# Patient Record
Sex: FEMALE | Race: WHITE | Employment: OTHER | URBAN - METROPOLITAN AREA
[De-identification: names, ages, dates, MRNs, and addresses within clinical notes are randomized per-mention and may not be internally consistent; named-entity substitution may affect disease eponyms.]

---

## 2017-02-10 ENCOUNTER — OFFICE VISIT (OUTPATIENT)
Dept: PULMONOLOGY | Facility: CLINIC | Age: 57
End: 2017-02-10
Attending: INTERNAL MEDICINE
Payer: MEDICARE

## 2017-02-10 VITALS
TEMPERATURE: 97.7 F | HEIGHT: 65 IN | SYSTOLIC BLOOD PRESSURE: 123 MMHG | WEIGHT: 192 LBS | HEART RATE: 114 BPM | DIASTOLIC BLOOD PRESSURE: 85 MMHG | RESPIRATION RATE: 18 BRPM | OXYGEN SATURATION: 94 % | BODY MASS INDEX: 31.99 KG/M2

## 2017-02-10 DIAGNOSIS — J44.9 CHRONIC AIRWAY OBSTRUCTION (H): Primary | ICD-10-CM

## 2017-02-10 DIAGNOSIS — J44.9 CHRONIC OBSTRUCTIVE PULMONARY DISEASE, UNSPECIFIED COPD TYPE (H): Primary | ICD-10-CM

## 2017-02-10 DIAGNOSIS — J44.1 COPD EXACERBATION (H): ICD-10-CM

## 2017-02-10 PROCEDURE — 99212 OFFICE O/P EST SF 10 MIN: CPT | Mod: ZF

## 2017-02-10 RX ORDER — PREDNISONE 10 MG/1
TABLET ORAL
Qty: 23 TABLET | Refills: 1 | Status: SHIPPED | OUTPATIENT
Start: 2017-02-10 | End: 2017-08-11

## 2017-02-10 ASSESSMENT — PAIN SCALES - GENERAL: PAINLEVEL: MILD PAIN (2)

## 2017-02-10 NOTE — NURSING NOTE
Chief Complaint   Patient presents with     RECHECK     Pulmonary Nodules     Shabnam Anders LPN  Neuroscience- Movement Disorders and Epilepsy

## 2017-02-10 NOTE — PROGRESS NOTES
"Reason for Visit  Mary Kay Deal is a 56 year old female who is being seen for RECHECK    Pulmonary HPI  The patient was seen and examined by Keith Mason MD     Mary Kay Deal is a 56 year old female with severe COPD on home O2.  She was empirically treated with SBRT for possible lung cancer (non biopsy proven) in the left UL in 2013. She has multiple pulmonary nodules.    Interval History:    The patient presents today with a cold which began 3 days ago in her sinuses (mild sore throat, rhinorrhea and sinus congestion). She woke up this morning with chest pain, possibly due to coughing. Sputum is currently yellow. She rarely produces sputum if she is not sick. Some blood in sinus drainage, but no hemoptysis. She has been taking 20 mg prednisone for the last 3 days.  Breathing is slightly worse today due to her current illness. She has experienced heartburn 1-2 times in the past week, which is her baseline. In general, she has been sleeping a little better but notices that her quality of sleep \"goes in streaks\" - she will sleep poorly for a few days and then sleep well for a few days. She has not started antibiotics for her current illness. The patient has been taking a day or 2 of prednisone for her breathing and then will stop. She has done this about 3 times since her appointment in August.      Before her recent cold, she reports she was not exercising. The patient's  recently set up a treadmill in her living room so that she can watch TV during exercise.    O2 requirements/use:   The patient does not always require O2 at rest, but will use 1-2lpm NC. While she is walking she requires 3-4lpm. When she is sleeping she uses 3-3.5 lpm supplemental O2.     Current Outpatient Prescriptions   Medication     predniSONE (DELTASONE) 10 MG tablet     simvastatin (ZOCOR) 20 MG tablet     tiotropium (SPIRIVA HANDIHALER) 18 MCG inhalation capsule     ACETAMINOPHEN PO     azithromycin (ZITHROMAX) 250 MG tablet     " omeprazole (PRILOSEC) 20 MG capsule     fluticasone (FLONASE) 50 MCG/ACT nasal spray     doxycycline (VIBRAMYCIN) 100 MG capsule     LORazepam (ATIVAN) 0.5 MG tablet     sertraline (ZOLOFT) 50 MG tablet     order for DME     albuterol (VENTOLIN HFA) 108 (90 BASE) MCG/ACT inhaler     Respiratory Therapy Supplies (NEBULIZER COMPRESSOR) KIT     [facility]. Devices (FINGERTIP PULSE OXIMETER) MISC     Chlorpheniramine-DM 4-20 MG TABS     Cholecalciferol (VITAMIN D) 1000 UNIT capsule     Calcium (CALCIUM 600) 600 MG tablet     ORDER FOR DME     MULTIVITAMIN TABS   OR     SENNA LAXATIVE OR     budesonide (PULMICORT) 1 MG/2ML SUSP nebulizer solution     arformoterol (BROVANA) 15 MCG/2ML NEBU     No current facility-administered medications for this visit.     Allergies   Allergen Reactions     Penicillins Hives     Past Medical History   Diagnosis Date     SMOKER      Other malaise and fatigue      GERD      Disorder of bone and cartilage, unspecified      Mild intermittent asthma      Emphysema      Aspergillosis, with pneumonia (H)      Other dyspnea and respiratory abnormality      Cancer (H)      questionable lung CA     Constipation      Chronic infection      Known fungal lung condition     Hx of radiation therapy      For lung mass       Past Surgical History   Procedure Laterality Date     Surgical history of -        s/p Breast tumor @ age 15 - benign     Surgical history of -        c section     Surgical history of -        tonsillectomy     Gyn surgery        x 1     Breast surgery       Tumor removal at the age of 15     Ent surgery       Tonsillectomy     Colonoscopy N/A 2014     Procedure: COLONOSCOPY;  Surgeon: Patricia Perry MD;  Location:  OR     Colonoscopy N/A 2015     Procedure: COMBINED COLONOSCOPY, SINGLE OR MULTIPLE BIOPSY/POLYPECTOMY BY BIOPSY;  Surgeon: Patricia Perry MD;  Location:  GI       Social History     Social History     Marital Status:      Spouse  "Name: N/A     Number of Children: N/A     Years of Education: N/A     Occupational History     Not on file.     Social History Main Topics     Smoking status: Former Smoker -- 1.00 packs/day for 30 years     Types: Cigarettes     Quit date: 10/21/2005     Smokeless tobacco: Never Used     Alcohol Use: No      Comment: sober since 1986     Drug Use: No     Sexual Activity:     Partners: Male      Comment: vas     Other Topics Concern     Parent/Sibling W/ Cabg, Mi Or Angioplasty Before 65f 55m? Yes     Social History Narrative    , 3 children    Retired  at [employer]          The patient is currently on disability due to COPD.  She rides a scooter due to her lungs.  She used to work as a .  She and her  are living with their daughter in [place].  They have a [employer] mix.  The patient stopped smoking 9 years ago.  She smoked 1-2 packs of cigarettes per day for 30 years, consistent with 45-60 pack year history.  She denies cigars, pipes or chewing tobacco.  She smoked marijuana in her teens.  The patient is an alcoholic and has been sober for the past 27 years.  Caffeine intake includes 4-5 cups of caffeinated coffee every morning.  She does not drink tea.  She consumes 1 can of Diet Pepsi as late as 7:00 p.m.  She eats chocolate candy bars as late as 7:00 p.m.  She does not exercise.  She has a treadmill, so she could; however, as of this time she is not exercising.                ROS Pulmonary  Constitutional- Negative. She has been trying to watch her diet and has stopped buying sweets for her house.  Eyes- Positive: Recently her eyes have been dry and sore, with \"little hard pellets\". No issues with double/blurry vision. Eyes are slightly red. She has tried a few different kind of eye drops, and has been using them every couple of hours at least.   Ear, nose and throat- Positive, increased sinus sx with recent cold, see HPI. Before her recent cold she notes her sinuses " "had improved with addition of Singulair.  Cardiac- She notes an occasional mid-sternal knife-like sensation, for which she saw her PCP in [place].   Pulm- See HPI  GI- Positive, Bowel movements are more regular, she is not experiencing constipation as frequently due to increased consistency with laxatives and increased water intake. She does note heartburn 1-2x/week.   Genitourinary- Negative  Musculoskeletal- Negative. Her back pain is at baseline.   Neurology- Positive, Headaches due to neck pain.  Dermatology- Negative  Endocrine- Negative  Lymphatic- Negative  Psychiatry- Negative    A complete ROS was otherwise negative except as noted in the HPI.    /85 mmHg  Pulse 114  Temp(Src) 97.7  F (36.5  C)  Resp 18  Ht 1.651 m (5' 5\")  Wt 87.091 kg (192 lb)  BMI 31.95 kg/m2  SpO2 94%  LMP 09/08/2004  Body mass index is 31.95 kg/(m^2).   Exam:   GENERAL APPEARANCE: Well developed, well nourished, alert, and in no apparent distress.  EYES: PERRL, EOMI  HENT: Nasal mucosa with no edema and no hyperemia. No nasal polyps.  EARS: Canals clear, TMs normal.  MOUTH: Oral mucosa is moist, without any lesions, no tonsillar enlargement, no oropharyngeal exudate.  NECK: supple, no masses, no thyromegaly.  LYMPHATICS: No significant axillary, cervical, or supraclavicular nodes.  RESP: normal percussion, Very diminished air flow throughout.  No crackles. No rhonchi. No wheezes.  CV: Normal S1, S2, regular rhythm, normal rate. No murmur.  No rub. No gallop. No LE edema.     Results:  Recent Results (from the past 168 hour(s))   General PFT Lab (Please always keep checked)    Collection Time: 02/10/17  9:54 AM   Result Value Ref Range    FVC-Pred 3.35 L    FVC-Pre 1.62 L    FVC-%Pred-Pre 48 %    FEV1-Pre 0.43 L    FEV1-%Pred-Pre 16 %    FEV1FVC-Pred 79 %    FEV1FVC-Pre 27 %    FEFMax-Pred 6.57 L/sec    FEFMax-Pre 1.37 L/sec    FEFMax-%Pred-Pre 20 %    FEF2575-Pred 2.43 L/sec    FEF2575-Pre 0.19 L/sec    ISL9032-%Pred-Pre 7 " %    ExpTime-Pre 9.22 sec    FIFMax-Pre 2.72 L/sec    FEV1FEV6-Pred 81 %    FEV1FEV6-Pre 32 %       Assessment and plan: Mary Kay Deal is a 56-year-old female has a longstanding history of severe obstructive lung disease who comes today to the clinic for follow up.    1. RUL mass /Aspergillus:  This lesion was biopsied by IR and it showed aspergillus and was treated with one month course of voriconazole. Due to lack of improvement it was stopped as per pt. This has remained stable. This was followed/managed by Dr. Pierre.    2. Emphysema: Very severe lung disease.  - Will cont O2 at night and with activity.  6MWT (1/22/2016): Walked 460 ft on 4 lpm NC. Hence recommended to use this during the day with exertion and while sleeping. Can be on RA at rest.   - Will cont flu vaccine every year and has received pneumonia vaccine 5 years apart x2 doses. Received PCV 13 12/5/2014.  - Cont spiriva and prn nebs/inhalers. Cont Budesonide/Brovana nebs.  - Cont azithromycin daily. Will follow Qtc prn --457msec on 6/13/2015  - She would benefit from doing outpt pulm rehab but she is unable to do it due to lack of transportation. She has a treadmill at home and is currently using it to exercise.  - Cont prn lorazepam.  - Cont singular daily.   2/10/2017: Currently having a COPD exacerbation.  - Start 10 day course of Doxycycline for current exacerbation. Cont prednisone 20 mg daily for 7 more days.   - Pt given sputum sample cup, instructed to provide sample when able.  - Advised pt to start prednisone burst at 40 mg and taper the next time she notices increased pulmonary symptoms.    Insomnia: Sleep study - no MADELIN noted (5/2014).    3. Pulmonary nodules:    - Chest CT done on 08/3/12 showed a  New left UL nodule that increased in size when followed. This was reviewed at Pulmonary nodule conference and given the risk of doing biopsy and after seeing Dr. Lorenzo she underwent SBRT. SBRT 5000cGy in 5 fractions over 3 months - last  dose on 08/29/13.  Chest CT (1/2014):  1.Prominent spiculated mass, inflammatory process or scarring in the right lung apex appears not significantly changed. 2. Decrease in the prominence of the patchy opacities in the left upper lobe, presumed postradiation changes.  Chest CT (10/2014): 1. Spiculated right apical mass is not significantly changed in size. 2. Some new or increased nodular densities from 07/08/2014. These include a lateral right lower lobe subpleural focus, and anterolateral left apical focus, and superior segment posterior lower lobe focus.    Chest CT 7/17/2015: 1. New findings on the 4/21/2015 chest CT have essentially resolved, including consolidation in the right middle lobe, left lower lobe, and a new right lower lobe pulmonary nodule. 2. There is a 3 mm left lower lobe pulmonary nodule that was new on the 7/8/2014 exam, and therefore demonstrates one-year of stability. Continued followup per Fleischner Society guidelines is recommended. 3. Centrilobular emphysema. 4. Small hiatal hernia.    Chest CT (1/22/16): Stable pulm nodules.    Chest CT (8/5/2016): Stable pulm nodules.    --  She is followed by Radiation oncology and they will continue to determine appropriate imaging follow up. She will get a Chest CT in one month. We will at the minimum need yearly Chest CT scans.    4. Allergies:   - Cont flonase 1 spray each nostril BID.   - To do netipot (nasal rinses) prn   - Cont Singulair.    5. Obesity:   -  Strongly advised to loose weight.   - Encouraged patient to eat healthier and gradually increase her exercise for weight loss.    6. GERD:   - Controlled on PPI at 20mg daily.    7. H/o Lung ca:  Continue follow up with Radiation oncology, they will decide about treatment regimen and CT's. This was Left UL lesion.    8. Recurrent episodes of voice loss: Improved, less often.   - Sputum cultures from 1/5/2015 were negative.    - Pt can continue to follow up with ENT.     9. Dry eyes:   -  Nothing concerning appreciated with physical exam. She is currently using OTC eye drops.   - Advised pt see an eye doctor if symptoms worsen.     F/u in six months with Spirometry.    Orders Placed This Encounter   Procedures     RESPIRATORY FLOW VOLUME LOOP          Scribe Disclosure:   I, Dania Abraham, am serving as a scribe; to document services personally performed by Keith Mason MD based on data collection and the provider's statements to me.     Provider Disclosure:  I agree with above History, Review of Systems, Physical exam and Plan.  I have reviewed the content of the documentation and have edited it as needed. I have personally performed the services documented here and the documentation accurately represents those services and the decisions I have made.      Electronically signed by:  Keith Mason MD.

## 2017-02-10 NOTE — MR AVS SNAPSHOT
After Visit Summary   2/10/2017    Mary Kay Deal    MRN: [id number]           Patient Information     Date Of Birth          1960        Visit Information        Provider Department      2/10/2017 10:30 AM Keith Mason MD [facility]        Today's Diagnoses     Chronic obstructive pulmonary disease, unspecified COPD type (H)    -  1     COPD exacerbation (H)            Follow-ups after your visit        Follow-up notes from your care team     Return in about 6 months (around 8/10/2017).      Your next 10 appointments already scheduled     Mar 15, 2017 11:00 AM   [facility] ([facility])    [address] [place] [id number] [phone]           Please bring any scans or X-rays taken at other hospitals, if similar tests were done. Also bring a list of your medicines, including vitamins, minerals and over-the-counter drugs. It is safest to leave personal items at home.  Be sure to tell your doctor:   If you have any allergies.   If there s any chance you are pregnant.   If you are breastfeeding.   If you have any special needs.  You do not need to do anything special to prepare.  Please wear loose clothing, such as a sweat suit or jogging clothes. Avoid snaps, zippers and other metal. We may ask you to undress and put on a hospital gown.            Mar 21, 2017 11:00 AM   Return Visit with Merrill Nevarez MD   Radiation Oncology Clinic ([facility])    [facility]  1st Floor  [address] [address]   [phone]            Aug 11, 2017 10:00 AM   PFT VISIT with  PFL A   [facility] Pulmonary Function Testing ([facility])    [address]  3rd Floor  [facility] [id number] [phone]            Aug 11, 2017 10:30 AM   (Arrive by 10:15 AM)   Return Visit with Keith Mason MD   [facility] ([facility])    [address]  3rd Floor  [facility] [address] [phone]              Future tests that were ordered for you today     Open Future Orders        Priority Expected Expires Ordered    Sputum Culture Aerobic Bacterial Routine 2/10/2017 8/10/2017 2/10/2017    Gram stain Routine 2/10/2017 8/10/2017 2/10/2017    Fungus Culture, non-blood Routine 2/10/2017 8/10/2017 2/10/2017    RESPIRATORY FLOW VOLUME LOOP Routine 8/10/2017 8/10/2017 2/10/2017            Who to contact     If you have questions or need follow up information about today's clinic visit or your schedule please contact [facility] directly at [phone].  Normal or non-critical lab and imaging results will be communicated to you by [employer], letter or phone within 4 business days after the clinic has received the results. If you do not hear from us within 7 days, please contact the clinic through [employer] or phone. If you have a critical or abnormal lab result, we will notify you by phone as soon as possible.  Submit refill requests through [employer] or call your pharmacy and they will forward the refill request to us. Please allow 3 business days for your refill to be completed.          Additional Information About Your Visit        [employer] Information     [employer] gives you secure access to your electronic health record. If you see a primary care provider, you can also send messages to your care team and make appointments. If you have questions, please call your primary care clinic.  If you do not have a primary care provider, please call [phone] and they will assist you.        Care EveryWhere ID     This is your Care EveryWhere ID. This could be used by other organizations to access your [facility] medical records  [id number]        Your Vitals Were     Pulse Temperature Respirations Height BMI (Body Mass Index) Pulse Oximetry     "114 97.7  F (36.5  C) 18 1.651 m (5' 5\") 31.95 kg/m2 94%    Last Period                   09/08/2004            Blood Pressure from Last 3 Encounters:   02/10/17 123/85   10/03/16 122/84   09/20/16 132/86    Weight from Last 3 Encounters:   02/10/17 87.091 kg (192 lb)   10/03/16 89.359 kg (197 lb)   09/20/16 87.68 kg (193 lb 4.8 oz)                 Where to get your medicines      These medications were sent to [facility] - [place], [place] - [address]  [address], [facility] [address]     Phone:  [phone]    - predniSONE 10 MG tablet       Primary Care Provider Office Phone # Fax #    Doug Godoy [phone]       [facility] [address]  [facility] [address]        Thank you!     Thank you for choosing [facility]  for your care. Our goal is always to provide you with excellent care. Hearing back from our patients is one way we can continue to improve our services. Please take a few minutes to complete the written survey that you may receive in the mail after your visit with us. Thank you!             Your Updated Medication List - Protect others around you: Learn how to safely use, store and throw away your medicines at www.disposemymeds.org.          This list is accurate as of: 2/10/17  4:05 PM.  Always use your most recent med list.                   Brand Name Dispense Instructions for use    ACETAMINOPHEN PO      Take 325-650 mg by mouth every 6 hours as needed for pain       albuterol 108 (90 BASE) MCG/ACT Inhaler    VENTOLIN HFA    3 Inhaler    Inhale 2 puffs into the lungs every 4 hours as needed       arformoterol 15 MCG/2ML Nebu neb solution    BROVANA    120 mL    Take 2 mLs (15 mcg) by nebulization 2 times daily       azithromycin 250 MG tablet    ZITHROMAX    30 tablet    Take 1 tablet (250 mg) by mouth daily       budesonide 1 MG/2ML Susp neb solution    PULMICORT    " 60 ampule    Take 2 mLs (1 mg) by nebulization 2 times daily       calcium carbonate 600 MG tablet   Generic drug:  calcium      Take 1 tablet by mouth 2 times daily. 1-2 tablets as tolerated       Chlorpheniramine-DM 4-20 MG Tabs     84 tablet    Take 1 tablet by mouth every 6 hours as needed.       doxycycline 100 MG capsule    VIBRAMYCIN    28 capsule    Take 1 capsule (100 mg) by mouth 2 times daily as needed       Fingertip Pulse Oximeter Misc     1 each    To be used as needed - for SOB.       fluticasone 50 MCG/ACT spray    FLONASE    16 g    USE ONE SPRAY IN EACH NOSTRIL TWICE A DAY       LORazepam 0.5 MG tablet    ATIVAN    30 tablet    Take 1 tablet (0.5 mg) by mouth every 8 hours as needed for anxiety       MULTIVITAMIN TABS   OR      1 TABLET BY MOUTH DAILY       Nebulizer Compressor Kit     1 kit    1 Device 4 times daily       omeprazole 20 MG CR capsule    priLOSEC    180 capsule    TAKE ONE CAPSULE BY MOUTH TWICE A DAY       order for DME      O2 at night and prn       order for DME     1 Device    Equipment being ordered: Oxygen --Please provide oxygen at 4 LPM via nasal canula with exertion and with sleep. Can be on room air at rest. Please provide portability. Keep oxygen saturations above 90%.       predniSONE 10 MG tablet    DELTASONE    23 tablet    Take 40 mg x 2 days, 30 mg x 2 days, 20 mg x 3 days and 10 mg x 3 days       SENNA LAXATIVE PO      2 TABLET BY MOUTH DAILY AS NEEDED FOR CONSTIPATION       sertraline 50 MG tablet    ZOLOFT    135 tablet    TAKE ONE AND ONE-HALF TABLET BY MOUTH EVERY DAY       simvastatin 20 MG tablet    ZOCOR    90 tablet    TAKE ONE TABLET BY MOUTH AT BEDTIME       tiotropium 18 MCG capsule    SPIRIVA HANDIHALER    90 capsule    Inhale 1 capsule (18 mcg) into the lungs daily       vitamin D 1000 UNITS capsule      Take 1 capsule by mouth 2 times daily.

## 2017-02-10 NOTE — LETTER
"2/10/2017       RE: Mary Kay Deal  [address]  [facility] [address]     Dear Colleague,    Thank you for referring your patient, Mary Kay Deal, to the [facility] at [facility]. Please see a copy of my visit note below.    Reason for Visit  Mary Kay Deal is a 56 year old female who is being seen for RECHECK    Pulmonary HPI  The patient was seen and examined by Keith Mason MD     Mary Kay Deal is a 56 year old female with severe COPD on home O2.  She was empirically treated with SBRT for possible lung cancer (non biopsy proven) in the left UL in 2013. She has multiple pulmonary nodules.    Interval History:    The patient presents today with a cold which began 3 days ago in her sinuses (mild sore throat, rhinorrhea and sinus congestion). She woke up this morning with chest pain, possibly due to coughing. Sputum is currently yellow. She rarely produces sputum if she is not sick. Some blood in sinus drainage, but no hemoptysis. She has been taking 20 mg prednisone for the last 3 days.  Breathing is slightly worse today due to her current illness. She has experienced heartburn 1-2 times in the past week, which is her baseline. In general, she has been sleeping a little better but notices that her quality of sleep \"goes in streaks\" - she will sleep poorly for a few days and then sleep well for a few days. She has not started antibiotics for her current illness. The patient has been taking a day or 2 of prednisone for her breathing and then will stop. She has done this about 3 times since her appointment in August.      Before her recent cold, she reports she was not exercising. The patient's  recently set up a treadmill in her living room so that she can watch TV during exercise.    O2 requirements/use:   The patient does not always require O2 at rest, but will use 1-2lpm NC. While she is walking she requires 3-4lpm. When she is " sleeping she uses 3-3.5 lpm supplemental O2.     Current Outpatient Prescriptions   Medication     predniSONE (DELTASONE) 10 MG tablet     simvastatin (ZOCOR) 20 MG tablet     tiotropium (SPIRIVA HANDIHALER) 18 MCG inhalation capsule     ACETAMINOPHEN PO     azithromycin (ZITHROMAX) 250 MG tablet     omeprazole (PRILOSEC) 20 MG capsule     fluticasone (FLONASE) 50 MCG/ACT nasal spray     doxycycline (VIBRAMYCIN) 100 MG capsule     LORazepam (ATIVAN) 0.5 MG tablet     sertraline (ZOLOFT) 50 MG tablet     order for DME     albuterol (VENTOLIN HFA) 108 (90 BASE) MCG/ACT inhaler     Respiratory Therapy Supplies (NEBULIZER COMPRESSOR) KIT     [facility] (FINGERTIP PULSE OXIMETER) MISC     Chlorpheniramine-DM 4-20 MG TABS     Cholecalciferol (VITAMIN D) 1000 UNIT capsule     Calcium (CALCIUM 600) 600 MG tablet     ORDER FOR DME     MULTIVITAMIN TABS   OR     SENNA LAXATIVE OR     budesonide (PULMICORT) 1 MG/2ML SUSP nebulizer solution     arformoterol (BROVANA) 15 MCG/2ML NEBU     No current facility-administered medications for this visit.     Allergies   Allergen Reactions     Penicillins Hives     Past Medical History   Diagnosis Date     SMOKER      Other malaise and fatigue      GERD      Disorder of bone and cartilage, unspecified      Mild intermittent asthma      Emphysema      Aspergillosis, with pneumonia (H)      Other dyspnea and respiratory abnormality      Cancer (H)      questionable lung CA     Constipation      Chronic infection      Known fungal lung condition     Hx of radiation therapy      For lung mass       Past Surgical History   Procedure Laterality Date     Surgical history of -        s/p Breast tumor @ age 15 - benign     Surgical history of -        c section     Surgical history of -        tonsillectomy     Gyn surgery        x 1     Breast surgery       Tumor removal at the age of 15     Ent surgery       Tonsillectomy     Colonoscopy N/A 2014     Procedure:  COLONOSCOPY;  Surgeon: Patricia Perry MD;  Location:  OR     Colonoscopy N/A 9/4/2015     Procedure: COMBINED COLONOSCOPY, SINGLE OR MULTIPLE BIOPSY/POLYPECTOMY BY BIOPSY;  Surgeon: Patricia Perry MD;  Location:  GI       Social History     Social History     Marital Status:      Spouse Name: N/A     Number of Children: N/A     Years of Education: N/A     Occupational History     Not on file.     Social History Main Topics     Smoking status: Former Smoker -- 1.00 packs/day for 30 years     Types: Cigarettes     Quit date: 10/21/2005     Smokeless tobacco: Never Used     Alcohol Use: No      Comment: sober since 1986     Drug Use: No     Sexual Activity:     Partners: Male      Comment: vas     Other Topics Concern     Parent/Sibling W/ Cabg, Mi Or Angioplasty Before 65f 55m? Yes     Social History Narrative    , 3 children    Retired  at [employer]          The patient is currently on disability due to COPD.  She rides a scooter due to her lungs.  She used to work as a .  She and her  are living with their daughter in [place].  They have a [employer] mix.  The patient stopped smoking 9 years ago.  She smoked 1-2 packs of cigarettes per day for 30 years, consistent with 45-60 pack year history.  She denies cigars, pipes or chewing tobacco.  She smoked marijuana in her teens.  The patient is an alcoholic and has been sober for the past 27 years.  Caffeine intake includes 4-5 cups of caffeinated coffee every morning.  She does not drink tea.  She consumes 1 can of Diet Pepsi as late as 7:00 p.m.  She eats chocolate candy bars as late as 7:00 p.m.  She does not exercise.  She has a treadmill, so she could; however, as of this time she is not exercising.                ROS Pulmonary  Constitutional- Negative. She has been trying to watch her diet and has stopped buying sweets for her house.  Eyes- Positive: Recently her eyes have been dry and sore, with  "\"little hard pellets\". No issues with double/blurry vision. Eyes are slightly red. She has tried a few different kind of eye drops, and has been using them every couple of hours at least.   Ear, nose and throat- Positive, increased sinus sx with recent cold, see HPI. Before her recent cold she notes her sinuses had improved with addition of Singulair.  Cardiac- She notes an occasional mid-sternal knife-like sensation, for which she saw her PCP in [place].   Pulm- See HPI  GI- Positive, Bowel movements are more regular, she is not experiencing constipation as frequently due to increased consistency with laxatives and increased water intake. She does note heartburn 1-2x/week.   Genitourinary- Negative  Musculoskeletal- Negative. Her back pain is at baseline.   Neurology- Positive, Headaches due to neck pain.  Dermatology- Negative  Endocrine- Negative  Lymphatic- Negative  Psychiatry- Negative    A complete ROS was otherwise negative except as noted in the HPI.    /85 mmHg  Pulse 114  Temp(Src) 97.7  F (36.5  C)  Resp 18  Ht 1.651 m (5' 5\")  Wt 87.091 kg (192 lb)  BMI 31.95 kg/m2  SpO2 94%  LMP 09/08/2004  Body mass index is 31.95 kg/(m^2).   Exam:   GENERAL APPEARANCE: Well developed, well nourished, alert, and in no apparent distress.  EYES: PERRL, EOMI  HENT: Nasal mucosa with no edema and no hyperemia. No nasal polyps.  EARS: Canals clear, TMs normal.  MOUTH: Oral mucosa is moist, without any lesions, no tonsillar enlargement, no oropharyngeal exudate.  NECK: supple, no masses, no thyromegaly.  LYMPHATICS: No significant axillary, cervical, or supraclavicular nodes.  RESP: normal percussion, Very diminished air flow throughout.  No crackles. No rhonchi. No wheezes.  CV: Normal S1, S2, regular rhythm, normal rate. No murmur.  No rub. No gallop. No LE edema.     Results:  Recent Results (from the past 168 hour(s))   General PFT Lab (Please always keep checked)    Collection Time: 02/10/17  9:54 AM "   Result Value Ref Range    FVC-Pred 3.35 L    FVC-Pre 1.62 L    FVC-%Pred-Pre 48 %    FEV1-Pre 0.43 L    FEV1-%Pred-Pre 16 %    FEV1FVC-Pred 79 %    FEV1FVC-Pre 27 %    FEFMax-Pred 6.57 L/sec    FEFMax-Pre 1.37 L/sec    FEFMax-%Pred-Pre 20 %    FEF2575-Pred 2.43 L/sec    FEF2575-Pre 0.19 L/sec    FVI5570-%Pred-Pre 7 %    ExpTime-Pre 9.22 sec    FIFMax-Pre 2.72 L/sec    FEV1FEV6-Pred 81 %    FEV1FEV6-Pre 32 %       Assessment and plan: Mary Kay Deal is a 56-year-old female has a longstanding history of severe obstructive lung disease who comes today to the clinic for follow up.    1. RUL mass /Aspergillus:  This lesion was biopsied by IR and it showed aspergillus and was treated with one month course of voriconazole. Due to lack of improvement it was stopped as per pt. This has remained stable. This was followed/managed by Dr. Pierre.    2. Emphysema: Very severe lung disease.  - Will cont O2 at night and with activity.  6MWT (1/22/2016): Walked 460 ft on 4 lpm NC. Hence recommended to use this during the day with exertion and while sleeping. Can be on RA at rest.   - Will cont flu vaccine every year and has received pneumonia vaccine 5 years apart x2 doses. Received PCV 13 12/5/2014.  - Cont spiriva and prn nebs/inhalers. Cont Budesonide/Brovana nebs.  - Cont azithromycin daily. Will follow Qtc prn --457msec on 6/13/2015  - She would benefit from doing outpt pulm rehab but she is unable to do it due to lack of transportation. She has a treadmill at home and is currently using it to exercise.  - Cont prn lorazepam.  - Cont singular daily.   2/10/2017: Currently having a COPD exacerbation.  - Start 10 day course of Doxycycline for current exacerbation. Cont prednisone 20 mg daily for 7 more days.   - Pt given sputum sample cup, instructed to provide sample when able.  - Advised pt to start prednisone burst at 40 mg and taper the next time she notices increased pulmonary symptoms.    Insomnia: Sleep study - no MADELIN  noted (5/2014).    3. Pulmonary nodules:    - Chest CT done on 08/3/12 showed a  New left UL nodule that increased in size when followed. This was reviewed at Pulmonary nodule conference and given the risk of doing biopsy and after seeing Dr. Lorenzo she underwent SBRT. SBRT 5000cGy in 5 fractions over 3 months - last dose on 08/29/13.  Chest CT (1/2014):  1.Prominent spiculated mass, inflammatory process or scarring in the right lung apex appears not significantly changed. 2. Decrease in the prominence of the patchy opacities in the left upper lobe, presumed postradiation changes.  Chest CT (10/2014): 1. Spiculated right apical mass is not significantly changed in size. 2. Some new or increased nodular densities from 07/08/2014. These include a lateral right lower lobe subpleural focus, and anterolateral left apical focus, and superior segment posterior lower lobe focus.    Chest CT 7/17/2015: 1. New findings on the 4/21/2015 chest CT have essentially resolved, including consolidation in the right middle lobe, left lower lobe, and a new right lower lobe pulmonary nodule. 2. There is a 3 mm left lower lobe pulmonary nodule that was new on the 7/8/2014 exam, and therefore demonstrates one-year of stability. Continued followup per Fleischner Society guidelines is recommended. 3. Centrilobular emphysema. 4. Small hiatal hernia.    Chest CT (1/22/16): Stable pulm nodules.    Chest CT (8/5/2016): Stable pulm nodules.    --  She is followed by Radiation oncology and they will continue to determine appropriate imaging follow up. She will get a Chest CT in one month. We will at the minimum need yearly Chest CT scans.    4. Allergies:   - Cont flonase 1 spray each nostril BID.   - To do netipot (nasal rinses) prn   - Cont Singulair.    5. Obesity:   -  Strongly advised to loose weight.   - Encouraged patient to eat healthier and gradually increase her exercise for weight loss.    6. GERD:   - Controlled on PPI at 20mg  daily.    7. H/o Lung ca:  Continue follow up with Radiation oncology, they will decide about treatment regimen and CT's. This was Left UL lesion.    8. Recurrent episodes of voice loss: Improved, less often.   - Sputum cultures from 1/5/2015 were negative.    - Pt can continue to follow up with ENT.     9. Dry eyes:   - Nothing concerning appreciated with physical exam. She is currently using OTC eye drops.   - Advised pt see an eye doctor if symptoms worsen.     F/u in six months with Spirometry.    Orders Placed This Encounter   Procedures     RESPIRATORY FLOW VOLUME LOOP          Scribe Disclosure:   I, Dnaia Abraham, am serving as a scribe; to document services personally performed by Keith Mason MD based on data collection and the provider's statements to me.     Provider Disclosure:  I agree with above History, Review of Systems, Physical exam and Plan.  I have reviewed the content of the documentation and have edited it as needed. I have personally performed the services documented here and the documentation accurately represents those services and the decisions I have made.      Electronically signed by:  Keith Mason MD.

## 2017-02-11 ENCOUNTER — HOSPITAL ENCOUNTER (OUTPATIENT)
Dept: LAB | Facility: CLINIC | Age: 57
Discharge: HOME | End: 2017-02-11
Attending: INTERNAL MEDICINE | Admitting: INTERNAL MEDICINE
Payer: MEDICARE

## 2017-02-11 DIAGNOSIS — J44.9 CHRONIC OBSTRUCTIVE PULMONARY DISEASE, UNSPECIFIED COPD TYPE (H): ICD-10-CM

## 2017-02-11 DIAGNOSIS — J44.1 COPD EXACERBATION (H): ICD-10-CM

## 2017-02-11 LAB
GRAM STN SPEC: NORMAL
Lab: NORMAL
MICRO REPORT STATUS: NORMAL
SPECIMEN SOURCE: NORMAL

## 2017-02-11 PROCEDURE — 87205 SMEAR GRAM STAIN: CPT | Performed by: INTERNAL MEDICINE

## 2017-02-11 PROCEDURE — 87102 FUNGUS ISOLATION CULTURE: CPT | Performed by: INTERNAL MEDICINE

## 2017-02-11 PROCEDURE — 87070 CULTURE OTHR SPECIMN AEROBIC: CPT | Performed by: INTERNAL MEDICINE

## 2017-02-13 LAB
BACTERIA SPEC CULT: NORMAL
MICRO REPORT STATUS: NORMAL
SPECIMEN SOURCE: NORMAL

## 2017-02-14 DIAGNOSIS — J43.9 EMPHYSEMA OF LUNG (H): Primary | ICD-10-CM

## 2017-02-14 RX ORDER — DOXYCYCLINE 100 MG/1
100 CAPSULE ORAL 2 TIMES DAILY
Qty: 20 CAPSULE | Refills: 1 | Status: SHIPPED | OUTPATIENT
Start: 2017-02-14 | End: 2018-03-26

## 2017-03-01 DIAGNOSIS — J44.9 COPD (CHRONIC OBSTRUCTIVE PULMONARY DISEASE) (H): ICD-10-CM

## 2017-03-01 RX ORDER — AZITHROMYCIN 250 MG/1
TABLET, FILM COATED ORAL
Qty: 30 TABLET | Refills: 10 | Status: SHIPPED | OUTPATIENT
Start: 2017-03-01 | End: 2018-02-26

## 2017-03-07 ENCOUNTER — TELEPHONE (OUTPATIENT)
Dept: PULMONOLOGY | Facility: CLINIC | Age: 57
End: 2017-03-07

## 2017-03-07 NOTE — TELEPHONE ENCOUNTER
Contacted by patient to state that [employer] has been calling patient stating they need more information from provider. This has gone on over a long period of time. On 9/9/16 spent 1 hr off and on discussing patient and reason her account needed attention. Faxed what was asked  and still there is some kind of confusion regarding this patient and her oxygen. Will send paperwork once again with message for [employer] staff to contact clinic as this needs to come to resolution.

## 2017-03-13 LAB
EXPTIME-PRE: 9.22 SEC
FEF2575-%PRED-PRE: 7 %
FEF2575-PRE: 0.19 L/SEC
FEF2575-PRED: 2.43 L/SEC
FEFMAX-%PRED-PRE: 20 %
FEFMAX-PRE: 1.37 L/SEC
FEFMAX-PRED: 6.57 L/SEC
FEV1-%PRED-PRE: 16 %
FEV1-PRE: 0.43 L
FEV1FEV6-PRE: 32 %
FEV1FEV6-PRED: 81 %
FEV1FVC-PRE: 27 %
FEV1FVC-PRED: 79 %
FIFMAX-PRE: 2.72 L/SEC
FUNGUS SPEC CULT: NORMAL
FVC-%PRED-PRE: 48 %
FVC-PRE: 1.62 L
FVC-PRED: 3.35 L
MICRO REPORT STATUS: NORMAL
SPECIMEN SOURCE: NORMAL

## 2017-03-15 ENCOUNTER — HOSPITAL ENCOUNTER (OUTPATIENT)
Dept: CT IMAGING | Facility: CLINIC | Age: 57
Discharge: HOME | End: 2017-03-15
Attending: RADIOLOGY | Admitting: RADIOLOGY
Payer: MEDICARE

## 2017-03-15 DIAGNOSIS — C34.12 MALIGNANT NEOPLASM OF UPPER LOBE OF LEFT LUNG (H): ICD-10-CM

## 2017-03-15 PROCEDURE — 71250 CT THORAX DX C-: CPT

## 2017-03-21 ENCOUNTER — OFFICE VISIT (OUTPATIENT)
Dept: RADIATION ONCOLOGY | Facility: CLINIC | Age: 57
End: 2017-03-21
Attending: RADIOLOGY
Payer: MEDICARE

## 2017-03-21 VITALS — HEART RATE: 89 BPM | OXYGEN SATURATION: 98 %

## 2017-03-21 DIAGNOSIS — C34.10: Primary | ICD-10-CM

## 2017-03-21 PROCEDURE — 99212 OFFICE O/P EST SF 10 MIN: CPT | Performed by: RADIOLOGY

## 2017-03-21 ASSESSMENT — PAIN SCALES - GENERAL: PAINLEVEL: NO PAIN (0)

## 2017-03-21 NOTE — PROGRESS NOTES
FOLLOW-UP VISIT    Patient Name: Mary Kay Deal      : 1960     Age: 56 year old        ______________________________________________________________________________     Chief Complaint   Patient presents with     Cancer     Follow up for Lung Cancer SBRT 5000cGy completed 13     /84  Pulse 89  LMP 2004  SpO2 98%     Date Radiation Completed: 2013    Pain  Denies    Labs  Other Labs: No    Imaging  CT: 3/15/17          Other Appointments:     MD Name:  Appointment Date:    MD Name: Appointment Date:   MD Name: Appointment Date:   Other Appointment Notes:     Residual Radiation side effect: denies side effects     Additional Instructions:

## 2017-03-21 NOTE — LETTER
3/21/2017       RE: Mary Kay Deal  [address]  [facility] [address]     Dear Colleague,    Thank you for referring your patient, Mary Kay Deal, to the [facility]COL[facility]Y CL[facility]IC. Please see a copy of my visit note below.    RADIATION ONCOLOGY FOLLOW-UP    NAME: Mary Kay Deal  MRN: [id number]  : 1960    DISEASE TREATED: Presumed NSCLC of the left upper lobe, [id number]    INTERVAL SINCE COMPLETION OF RADIOTHERAPY: ~3.5 years (Completed 2013)    TYPE OF RADIOTHERAPY GIVEN: SBRT 5000cGy in 5 fractions, 6MV, 80%IDL    SUBJECTIVE:   Mrs. Deal is a 55 year old woman with history of pulmonary fungal infection of the RUL and non-biopsy proven stage I lung cancer of the RUBEN s/p SBRT. She returns for routine follow-up.    On exam today, Ms. Deal is overall doing well. She denies any new shortness of breath; she remains on 2L/min of oxygen via concentrator at all times, up to 3-4L with exertion and at night. Her energy level and cough are at baseline. She otherwise denies fevers, chills, nausea, vomiting, diarrhea. A complete review of systems was otherwise unremarkable.    OBJECTIVE:  Gen: No acute distress. Alert, oriented.   Neck: No palpable LNs in the neck or supraclavicular areas.   CV: Regular rate and rhythm. No murmur.   Lungs: Slight end-expiratory wheeze. Otherwise CTAB    IMAGING:   CT scan 3/15/2017:  IMPRESSION:  1. Stable pulmonary nodules and emphysematous change.    IMPRESSION: Radiographic complete response to SBRT of the RUBEN lesion.     RECOMMENDATIONS: Follow up with Radiation Oncology in 6 months with repeat CT of the chest without contrast.     Patient seen and discussed with staff, Dr. Nevarez.    Tye Fournier MD PGY-3  Radiation Oncology Resident, [facility]  Phone: [phone]    I saw the patient with the resident.  I agree with the resident's note and plan of care.      RENÉ Nevarez M.D.  Department of Radiation Oncology  [facility]    FOLLOW-UP  VISIT    Patient Name: Mary Kay Deal      : 1960     Age: 56 year old        ______________________________________________________________________________     Chief Complaint   Patient presents with     Cancer     Follow up for Lung Cancer SBRT 5000cGy completed 13     /84  Pulse 89  LMP 2004  SpO2 98%     Date Radiation Completed: 2013    Pain  Denies    Labs  Other Labs: No    Imaging  CT: 3/15/17          Other Appointments:     MD Name:  Appointment Date:    MD Name: Appointment Date:   MD Name: Appointment Date:   Other Appointment Notes:     Residual Radiation side effect: denies side effects     Additional Instructions:       Again, thank you for allowing me to participate in the care of your patient.      Sincerely,    Merrill Nevarez MD

## 2017-03-21 NOTE — MR AVS SNAPSHOT
After Visit Summary   3/21/2017    Mary Kay Deal    MRN: [id number]           Patient Information     Date Of Birth          1960        Visit Information        Provider Department      3/21/2017 11:00 AM Merrill Nevarez MD Radiation Oncology Clinic        Today's Diagnoses     Malignant neoplasm of upper lobe of lung (H)    -  1       Follow-ups after your visit        Your next 10 appointments already scheduled     Aug 11, 2017 10:00 AM CDT   PFT VISIT with ESTER CAGLE   [facility] Function Testing ([facility])    [address] [place] [id number] [phone]            Aug 11, 2017 10:30 AM CDT   (Arrive by 10:15 AM)   Return Visit with Keith Mason MD   [facility] ([facility])    [address] [place] [id number] [phone]              Who to contact     Please call your clinic at [phone] to:    Ask questions about your health    Make or cancel appointments    Discuss your medicines    Learn about your test results    Speak to your doctor   If you have compliments or concerns about an experience at your clinic, or if you wish to file a complaint, please contact [facility] Patient Relations at [phone] or email us at [email or web address]         Additional Information About Your Visit        MyChart Information     [employer] gives you secure access to your electronic health record. If you see a primary care provider, you can also send messages to your care team and make appointments. If you have questions, please call your primary care clinic.  If you do not have a primary care provider, please call [phone] and they will assist you.      [employer] is an electronic gateway that provides easy, online access to your medical records. With [employer], you can request a clinic appointment, read your  test results, renew a prescription or communicate with your care team.     To access your existing account, please contact your [facility] or call [phone] for assistance.        Care EveryWhere ID     This is your Care EveryWhere ID. This could be used by other organizations to access your [facility] medical records  [id number]        Your Vitals Were     Pulse Last Period Pulse Oximetry             89 09/08/2004 98%          Blood Pressure from Last 3 Encounters:   02/10/17 123/85   10/03/16 122/84   09/20/16 132/86    Weight from Last 3 Encounters:   02/10/17 87.1 kg (192 lb)   10/03/16 89.4 kg (197 lb)   09/20/16 87.7 kg (193 lb 4.8 oz)              Today, you had the following     No orders found for display       Primary Care Provider Office Phone # Fax #    Doug Godoy [phone]       [facility] [address]  [facility] [address]        Thank you!     Thank you for choosing [facility] CLINIC  for your care. Our goal is always to provide you with excellent care. Hearing back from our patients is one way we can continue to improve our services. Please take a few minutes to complete the written survey that you may receive in the mail after your visit with us. Thank you!             Your Updated Medication List - Protect others around you: Learn how to safely use, store and throw away your medicines at www.disposemymeds.org.          This list is accurate as of: 3/21/17 11:59 PM.  Always use your most recent med list.                   Brand Name Dispense Instructions for use    ACETAMINOPHEN PO      Take 325-650 mg by mouth every 6 hours as needed for pain Reported on 3/21/2017       albuterol 108 (90 BASE) MCG/ACT Inhaler    VENTOLIN HFA    3 Inhaler    Inhale 2 puffs into the lungs every 4 hours as needed       arformoterol 15 MCG/2ML Nebu neb solution    BROVANA    120 mL    Take 2 mLs (15 mcg) by nebulization 2 times  daily       * azithromycin 250 MG tablet    ZITHROMAX    30 tablet    Take 1 tablet (250 mg) by mouth daily       * azithromycin 250 MG tablet    ZITHROMAX    30 tablet    TAKE ONE TABLET BY MOUTH EVERY DAY AS ANTIINFAMMATORY FOR COPD       budesonide 1 MG/2ML Susp neb solution    PULMICORT    60 ampule    Take 2 mLs (1 mg) by nebulization 2 times daily       calcium carbonate 600 MG tablet   Generic drug:  calcium      Take 1 tablet by mouth 2 times daily. 1-2 tablets as tolerated       Chlorpheniramine-DM 4-20 MG Tabs     84 tablet    Take 1 tablet by mouth every 6 hours as needed.       * doxycycline 100 MG capsule    VIBRAMYCIN    28 capsule    Take 100 mg by mouth 2 times daily as needed Reported on 3/21/2017       * doxycycline 100 MG capsule    VIBRAMYCIN    20 capsule    Take 1 capsule (100 mg) by mouth 2 times daily       Fingertip Pulse Oximeter Misc     1 each    To be used as needed - for SOB.       fluticasone 50 MCG/ACT spray    FLONASE    16 g    USE ONE SPRAY IN EACH NOSTRIL TWICE A DAY       LORazepam 0.5 MG tablet    ATIVAN    30 tablet    Take 1 tablet (0.5 mg) by mouth every 8 hours as needed for anxiety       MULTIVITAMIN TABS   OR      1 TABLET BY MOUTH DAILY       Nebulizer Compressor Kit     1 kit    1 Device 4 times daily       omeprazole 20 MG CR capsule    priLOSEC    180 capsule    TAKE ONE CAPSULE BY MOUTH TWICE A DAY       order for DME      O2 at night and prn       order for DME     1 Device    Equipment being ordered: Oxygen --Please provide oxygen at 4 LPM via nasal canula with exertion and with sleep. Can be on room air at rest. Please provide portability. Keep oxygen saturations above 90%.       predniSONE 10 MG tablet    DELTASONE    23 tablet    Take 40 mg x 2 days, 30 mg x 2 days, 20 mg x 3 days and 10 mg x 3 days       SENNA LAXATIVE PO      2 TABLET BY MOUTH DAILY AS NEEDED FOR CONSTIPATION       sertraline 50 MG tablet    ZOLOFT    135 tablet    TAKE ONE AND ONE-HALF TABLET  BY MOUTH EVERY DAY       simvastatin 20 MG tablet    ZOCOR    90 tablet    TAKE ONE TABLET BY MOUTH AT BEDTIME       tiotropium 18 MCG capsule    SPIRIVA HANDIHALER    90 capsule    Inhale 1 capsule (18 mcg) into the lungs daily       vitamin D 1000 UNITS capsule      Take 1 capsule by mouth 2 times daily.       * Notice:  This list has 4 medication(s) that are the same as other medications prescribed for you. Read the directions carefully, and ask your doctor or other care provider to review them with you.

## 2017-03-23 NOTE — PROGRESS NOTES
RADIATION ONCOLOGY FOLLOW-UP    NAME: Mary Kay Deal  MRN: [id number]  : 1960    DISEASE TREATED: Presumed NSCLC of the left upper lobe, [id number]    INTERVAL SINCE COMPLETION OF RADIOTHERAPY: ~3.5 years (Completed 2013)    TYPE OF RADIOTHERAPY GIVEN: SBRT 5000cGy in 5 fractions, 6MV, 80%IDL    SUBJECTIVE:   Mrs. Deal is a 55 year old woman with history of pulmonary fungal infection of the RUL and non-biopsy proven stage I lung cancer of the RUBEN s/p SBRT. She returns for routine follow-up.    On exam today, Ms. Deal is overall doing well. She denies any new shortness of breath; she remains on 2L/min of oxygen via concentrator at all times, up to 3-4L with exertion and at night. Her energy level and cough are at baseline. She otherwise denies fevers, chills, nausea, vomiting, diarrhea. A complete review of systems was otherwise unremarkable.    OBJECTIVE:  Gen: No acute distress. Alert, oriented.   Neck: No palpable LNs in the neck or supraclavicular areas.   CV: Regular rate and rhythm. No murmur.   Lungs: Slight end-expiratory wheeze. Otherwise CTAB    IMAGING:   CT scan 3/15/2017:  IMPRESSION:  1. Stable pulmonary nodules and emphysematous change.    IMPRESSION: Radiographic complete response to SBRT of the RUBEN lesion.     RECOMMENDATIONS: Follow up with Radiation Oncology in 6 months with repeat CT of the chest without contrast.     Patient seen and discussed with staff, Dr. Nevarez.    Tye Fournier MD PGY-3  Radiation Oncology Resident, [facility]  Phone: [phone]    I saw the patient with the resident.  I agree with the resident's note and plan of care.      RENÉ Nevarez M.D.  Department of Radiation Oncology  [facility]

## 2017-03-30 DIAGNOSIS — F41.9 ANXIETY: ICD-10-CM

## 2017-03-30 NOTE — TELEPHONE ENCOUNTER
Controlled Substance Refill Request for Ativan  Problem List Complete:  No     PROVIDER TO CONSIDER COMPLETION OF PROBLEM LIST AND OVERVIEW/CONTROLLED SUBSTANCE AGREEMENT    Last Written Prescription Date:  7-1-16  Last Fill Quantity: 30,   # refills: 3    Last Office Visit with [facility] primary care provider: 3-    Future Office visit:     Controlled substance agreement on file: No.     Processing:  Staff will hand deliver Rx to on-site pharmacy   checked in past 6 months?  No, route to ARIES Samuels  [place] Pharmacy Technician  [facility]  [phone] fax [phone]

## 2017-03-31 RX ORDER — LORAZEPAM 0.5 MG/1
0.5 TABLET ORAL EVERY 8 HOURS PRN
Qty: 30 TABLET | Refills: 3 | Status: SHIPPED | OUTPATIENT
Start: 2017-03-31 | End: 2017-11-16

## 2017-03-31 NOTE — TELEPHONE ENCOUNTER
I will refill prescription but may not print from home - can we route to DOD if not printing    Doug Godoy MD

## 2017-03-31 NOTE — TELEPHONE ENCOUNTER
LORazepam (ATIVAN) 0.5 MG tablet 30 tablet 3 7/1/2016         Last Written Prescription Date: 07/01/2016  Last Fill Quantity: 30,  # refills: 3   Last Office Visit with [facility], DIDIER or [facility] prescribing provider: 10/03/2016

## 2017-04-23 DIAGNOSIS — J44.9 COPD (CHRONIC OBSTRUCTIVE PULMONARY DISEASE) (H): ICD-10-CM

## 2017-04-23 DIAGNOSIS — B37.0 ORAL THRUSH: ICD-10-CM

## 2017-04-24 RX ORDER — ALBUTEROL SULFATE 90 UG/1
AEROSOL, METERED RESPIRATORY (INHALATION)
Qty: 54 G | Refills: 9 | Status: SHIPPED | OUTPATIENT
Start: 2017-04-24 | End: 2018-05-07

## 2017-05-03 ENCOUNTER — TELEPHONE (OUTPATIENT)
Dept: FAMILY MEDICINE | Facility: CLINIC | Age: 57
End: 2017-05-03

## 2017-05-03 NOTE — TELEPHONE ENCOUNTER
I called Mary Kay and answered her questions.  She also asked about seeing a GI doctor. She has seen [facility] before, so I told her to call them and if they need a referral from us to let us know.   Nesha Dodson MA

## 2017-05-03 NOTE — TELEPHONE ENCOUNTER
You can let her know that her CT showed    1. Stable pulmonary nodules and emphysematous change.  2. Possible metallic foreign body in the transverse colon, of  uncertain clinical significance

## 2017-05-03 NOTE — TELEPHONE ENCOUNTER
Reason for Call:  Request for results:    Name of test or procedure: CT Scan    Date of test of procedure: 3/15/17    Location of the test or procedure:     OK to leave the result message on voice mail or with a family member? YES    Phone number Patient can be reached at:  Home number on file [phone] (home)    Additional comments: please call.  Patient has questions about these results.    Call taken on 5/3/2017 at 12:02 PM by Melissa Escobar.

## 2017-05-09 ENCOUNTER — TELEPHONE (OUTPATIENT)
Dept: FAMILY MEDICINE | Facility: CLINIC | Age: 57
End: 2017-05-09

## 2017-05-09 NOTE — TELEPHONE ENCOUNTER
Reason for Call:  Other referral    Detailed comments: patient called [facility] but they are surgeons, she would like referral to regular GI Dr.     Phone Number Patient can be reached at: Home number on file [phone] (home)    Best Time: any    Can we leave a detailed message on this number? YES    Call taken on 5/9/2017 at 1:01 PM by Akilah Ayala

## 2017-05-10 NOTE — TELEPHONE ENCOUNTER
I did speak to Mary Kay Deal and she was able to speak to colorectal surgery and foreign body is identified as a clip.    She has a follow up appointment with myself next week    Doug Godoy MD

## 2017-05-15 ENCOUNTER — OFFICE VISIT (OUTPATIENT)
Dept: FAMILY MEDICINE | Facility: CLINIC | Age: 57
End: 2017-05-15
Payer: MEDICARE

## 2017-05-15 VITALS
DIASTOLIC BLOOD PRESSURE: 82 MMHG | TEMPERATURE: 98.1 F | OXYGEN SATURATION: 93 % | BODY MASS INDEX: 31.99 KG/M2 | HEIGHT: 65 IN | SYSTOLIC BLOOD PRESSURE: 137 MMHG | WEIGHT: 192 LBS | HEART RATE: 90 BPM

## 2017-05-15 DIAGNOSIS — K59.00 CONSTIPATION, UNSPECIFIED CONSTIPATION TYPE: ICD-10-CM

## 2017-05-15 DIAGNOSIS — K21.00 GASTROESOPHAGEAL REFLUX DISEASE WITH ESOPHAGITIS: Primary | ICD-10-CM

## 2017-05-15 DIAGNOSIS — L98.9 SKIN LESION: ICD-10-CM

## 2017-05-15 LAB
ALBUMIN SERPL-MCNC: 3.4 G/DL (ref 3.4–5)
ALP SERPL-CCNC: 67 U/L (ref 40–150)
ALT SERPL W P-5'-P-CCNC: 20 U/L (ref 0–50)
AST SERPL W P-5'-P-CCNC: 16 U/L (ref 0–45)
BILIRUB DIRECT SERPL-MCNC: <0.1 MG/DL (ref 0–0.2)
BILIRUB SERPL-MCNC: 0.3 MG/DL (ref 0.2–1.3)
LIPASE SERPL-CCNC: 124 U/L (ref 73–393)
PROT SERPL-MCNC: 7.1 G/DL (ref 6.8–8.8)

## 2017-05-15 PROCEDURE — 83690 ASSAY OF LIPASE: CPT | Performed by: INTERNAL MEDICINE

## 2017-05-15 PROCEDURE — 36415 COLL VENOUS BLD VENIPUNCTURE: CPT | Performed by: INTERNAL MEDICINE

## 2017-05-15 PROCEDURE — 99214 OFFICE O/P EST MOD 30 MIN: CPT | Performed by: INTERNAL MEDICINE

## 2017-05-15 PROCEDURE — 80076 HEPATIC FUNCTION PANEL: CPT | Performed by: INTERNAL MEDICINE

## 2017-05-15 NOTE — NURSING NOTE
"Chief Complaint   Patient presents with     Gastrophageal Reflux       Initial /82 (BP Location: Right arm, Patient Position: Chair, Cuff Size: Adult Large)  Pulse 90  Temp 98.1  F (36.7  C) (Tympanic)  Ht 5' 5\" (1.651 m)  Wt 192 lb (87.1 kg)  LMP 09/08/2004  SpO2 93%  Breastfeeding? No  BMI 31.95 kg/m2 Estimated body mass index is 31.95 kg/(m^2) as calculated from the following:    Height as of this encounter: 5' 5\" (1.651 m).    Weight as of this encounter: 192 lb (87.1 kg).  Medication Reconciliation: complete   Jessica Egan- ISIDORO      "

## 2017-05-15 NOTE — PATIENT INSTRUCTIONS
(K21.0) Gastroesophageal reflux disease with esophagitis  (primary encounter diagnosis)  Comment: We will refer to gastroenterology to discuss need for upper endoscopy and we will conitnue omeprazole  Plan: GASTROENTEROLOGY ADULT REF CONSULT ONLY,         Hepatic panel (Albumin, ALT, AST, Bili, Alk         Phos, TP), Lipase, US Abdomen Complete            (K59.00) Constipation, unspecified constipation type  Comment: I would recommend that you remain hydrated and continue stool softeners.   We will also request an abdominal ultrasound to be scheduled at [facility] phone #[phone]   Plan: GASTROENTEROLOGY ADULT REF CONSULT ONLY,         Hepatic panel (Albumin, ALT, AST, Bili, Alk         Phos, TP), Lipase, US Abdomen Complete

## 2017-05-15 NOTE — MR AVS SNAPSHOT
After Visit Summary   5/15/2017    Mary Kay Deal    MRN: [id number]           Patient Information     Date Of Birth          1960        Visit Information        Provider Department      5/15/2017 10:30 AM Doug Godoy MD [facility]        Today's Diagnoses     Gastroesophageal reflux disease with esophagitis    -  1    Constipation, unspecified constipation type          Care Instructions    (K21.0) Gastroesophageal reflux disease with esophagitis  (primary encounter diagnosis)  Comment: We will refer to gastroenterology to discuss need for upper endoscopy and we will conitnue omeprazole  Plan: GASTROENTEROLOGY ADULT REF CONSULT ONLY,         Hepatic panel (Albumin, ALT, AST, Bili, Alk         Phos, TP), Lipase, US Abdomen Complete            (K59.00) Constipation, unspecified constipation type  Comment: I would recommend that you remain hydrated and continue stool softeners.   We will also request an abdominal ultrasound to be scheduled at yor convenience.  Plan: GASTROENTEROLOGY ADULT REF CONSULT ONLY,         Hepatic panel (Albumin, ALT, AST, Bili, Alk         Phos, TP), Lipase, US Abdomen Complete                   Follow-ups after your visit        Additional Services     GASTROENTEROLOGY ADULT REF CONSULT ONLY       Preferred Location: [facility] [phone]      Please be aware that coverage of these services is subject to the terms and limitations of your health insurance plan.  Call member services at your health plan with any benefit or coverage questions.  Any procedures must be performed at a [facility] facility OR coordinated by your clinic's referral office.    Please bring the following with you to your appointment:    (1) Any X-Rays, CTs or MRIs which have been performed.  Contact the facility where they were done to arrange for  prior to your scheduled appointment.    (2) List of current medications   (3) This referral request   (4) Any documents/labs  given to you for this referral                  Your next 10 appointments already scheduled     Aug 11, 2017 10:00 AM CDT   PFT VISIT with ESTER CAGLE   [facility]    [address] [address] [phone]            Aug 11, 2017 10:30 AM CDT   (Arrive by 10:15 AM)   Return Visit with Keith Mason MD   [facility]    [address] [place] [id number] [phone]              Future tests that were ordered for you today     Open Future Orders        Priority Expected Expires Ordered    US Abdomen Complete Routine  5/15/2018 5/15/2017            Who to contact     If you have questions or need follow up information about today's clinic visit or your schedule please contact [facility] directly at [phone].  Normal or non-critical lab and imaging results will be communicated to you by [employer], letter or phone within 4 business days after the clinic has received the results. If you do not hear from us within 7 days, please contact the clinic through [employer] or phone. If you have a critical or abnormal lab result, we will notify you by phone as soon as possible.  Submit refill requests through [employer] or call your pharmacy and they will forward the refill request to us. Please allow 3 business days for your refill to be completed.          Additional Information About Your Visit        [employer] Information     [employer] gives you secure access to your electronic health record. If you see a primary care provider, you can also send messages to your care team and make appointments. If you have questions, please call your primary care clinic.  If you do not have a primary care provider, please call [phone] and they will assist you.        Care EveryWhere ID     This is your Care EveryWhere ID. This could be used  "by other organizations to access your [facility] medical records  [id number]        Your Vitals Were     Pulse Temperature Height Last Period Pulse Oximetry Breastfeeding?    90 98.1  F (36.7  C) (Tympanic) 5' 5\" (1.651 m) 09/08/2004 93% No    BMI (Body Mass Index)                   31.95 kg/m2            Blood Pressure from Last 3 Encounters:   05/15/17 137/82   02/10/17 123/85   10/03/16 122/84    Weight from Last 3 Encounters:   05/15/17 192 lb (87.1 kg)   02/10/17 192 lb (87.1 kg)   10/03/16 197 lb (89.4 kg)              We Performed the Following     GASTROENTEROLOGY ADULT REF CONSULT ONLY     Hepatic panel (Albumin, ALT, AST, Bili, Alk Phos, TP)     Lipase        Primary Care Provider Office Phone # Fax #    Doug Godoy [phone]       [facility] [address]  [facility] [address]        Thank you!     Thank you for choosing [facility]  for your care. Our goal is always to provide you with excellent care. Hearing back from our patients is one way we can continue to improve our services. Please take a few minutes to complete the written survey that you may receive in the mail after your visit with us. Thank you!             Your Updated Medication List - Protect others around you: Learn how to safely use, store and throw away your medicines at www.disposemymeds.org.          This list is accurate as of: 5/15/17 11:07 AM.  Always use your most recent med list.                   Brand Name Dispense Instructions for use    ACETAMINOPHEN PO      Take 325-650 mg by mouth every 6 hours as needed for pain Reported on 3/21/2017       arformoterol 15 MCG/2ML Nebu neb solution    BROVANA    120 mL    Take 2 mLs (15 mcg) by nebulization 2 times daily       azithromycin 250 MG tablet    ZITHROMAX    30 tablet    TAKE ONE TABLET BY MOUTH EVERY DAY AS ANTIINFAMMATORY FOR COPD       calcium carbonate 600 MG tablet   Generic drug:  calcium      Take 1 tablet by " mouth 2 times daily. 1-2 tablets as tolerated       Chlorpheniramine-DM 4-20 MG Tabs     84 tablet    Take 1 tablet by mouth every 6 hours as needed.       * doxycycline 100 MG capsule    VIBRAMYCIN    28 capsule    Take 100 mg by mouth 2 times daily as needed Reported on 3/21/2017       * doxycycline 100 MG capsule    VIBRAMYCIN    20 capsule    Take 1 capsule (100 mg) by mouth 2 times daily       Fingertip Pulse Oximeter Misc     1 each    To be used as needed - for SOB.       fluticasone 50 MCG/ACT spray    FLONASE    16 g    USE ONE SPRAY IN EACH NOSTRIL TWICE A DAY       LORazepam 0.5 MG tablet    ATIVAN    30 tablet    Take 1 tablet (0.5 mg) by mouth every 8 hours as needed for anxiety       MULTIVITAMIN TABS   OR      1 TABLET BY MOUTH DAILY       Nebulizer Compressor Kit     1 kit    1 Device 4 times daily       omeprazole 20 MG CR capsule    priLOSEC    180 capsule    TAKE ONE CAPSULE BY MOUTH TWICE A DAY       order for DME      O2 at night and prn       order for DME     1 Device    Equipment being ordered: Oxygen --Please provide oxygen at 4 LPM via nasal canula with exertion and with sleep. Can be on room air at rest. Please provide portability. Keep oxygen saturations above 90%.       predniSONE 10 MG tablet    DELTASONE    23 tablet    Take 40 mg x 2 days, 30 mg x 2 days, 20 mg x 3 days and 10 mg x 3 days       SENNA LAXATIVE PO      2 TABLET BY MOUTH DAILY AS NEEDED FOR CONSTIPATION       sertraline 50 MG tablet    ZOLOFT    135 tablet    TAKE ONE AND ONE-HALF TABLET BY MOUTH EVERY DAY       simvastatin 20 MG tablet    ZOCOR    90 tablet    TAKE ONE TABLET BY MOUTH AT BEDTIME       tiotropium 18 MCG capsule    SPIRIVA HANDIHALER    90 capsule    Inhale 1 capsule (18 mcg) into the lungs daily       VENTOLIN  (90 BASE) MCG/ACT Inhaler   Generic drug:  albuterol     54 g    INHALE 2 PUFFS BY MOUTH INTO THE LUNGS EVERY 4 HOURS AS NEEDED       vitamin D 1000 UNITS capsule      Take 1 capsule by  mouth 2 times daily.       * Notice:  This list has 2 medication(s) that are the same as other medications prescribed for you. Read the directions carefully, and ask your doctor or other care provider to review them with you.

## 2017-05-15 NOTE — PROGRESS NOTES
"[facility]  CLINIC PROGRESS NOTE    Subjective:  Epigastric pain and gas distension   Mary Kay Deal has noticed that she has onset of epigastric pain and bloating for the past few months.  Symptoms seem to come and go.  This past bout has been ongoing for over one week.  There was no specific food that triggered this.   She has occasional explosive bowel movements when she has a bowel movement.  Her main concerns is that she has bloating and difficulty having a regular bowel movement as it takes a few days to have a bowel movement.  No bleeding seen in the stool.  Her last colonoscopy was in 2015.  She was noted to have polyps and was recommended to return in 3 years.     She has continued to take omeprazole 20 mg daily and also has continue on senna.       Past medical history, medications, allergies, social history, family history reviewed and updated in EPIC as of 5/15/2017 .    ROS  CONSTITUTIONAL: no fatigue, no unexpected change in weight  SKIN: no worrisome rashes, no worrisome moles, no worrisome lesions  EYES: no acute vision problems or changes  ENT: no ear problems, no mouth problems, no throat problems  RESP: no significant cough, no shortness of breath  CV: no chest pain, no palpitations, no new or worsening peripheral edema  GI: no nausea, no vomiting, no constipation, no diarrhea  : no frequency, no dysuria, no hematuria  MS: using scooter  PSYCHIATRIC: no changes in mood or affect      Objective:  Vitals  /82 (BP Location: Right arm, Patient Position: Chair, Cuff Size: Adult Large)  Pulse 90  Temp 98.1  F (36.7  C) (Tympanic)  Ht 5' 5\" (1.651 m)  Wt 192 lb (87.1 kg)  LMP 09/08/2004  SpO2 93%  Breastfeeding? No  BMI 31.95 kg/m2  GEN: Alert Oriented x3 NAD  HEENT: Atraumatic, normocephalic, neck supple, no thyromegaly, negative cervical adenopathy  CV: RRR no murmurs or rubs  PULM: CTA no wheezes or crackles  ABD: Soft, nontender nondistended, no hepatosplenomegally  SKIN: " small flesh colored lesion beneath left lip  EXT: no edema bilateral lower extremities  NEURO: Gait and station deferred, No focal neurologic deficits  PSYCH: Mood good, affect mood congruent    No results found for this or any previous visit (from the past 24 hour(s)).    Assessment/Plan:  Patient Instructions   (K21.0) Gastroesophageal reflux disease with esophagitis  (primary encounter diagnosis)  Comment: We will refer to gastroenterology to discuss need for upper endoscopy and we will conitnue omeprazole  Plan: GASTROENTEROLOGY ADULT REF CONSULT ONLY,         Hepatic panel (Albumin, ALT, AST, Bili, Alk         Phos, TP), Lipase, US Abdomen Complete            (K59.00) Constipation, unspecified constipation type  Comment: I would recommend that you remain hydrated and continue stool softeners.   We will also request an abdominal ultrasound to be scheduled at [facility] phone #[phone]   Plan: GASTROENTEROLOGY ADULT REF CONSULT ONLY,         Hepatic panel (Albumin, ALT, AST, Bili, Alk         Phos, TP), Lipase, US Abdomen Complete             25 minutes spent with patient.  Over 50% of time counseling     Follow up in gastroenterology     Disclaimer: This note consists of symbols derived from keyboarding, dictation and/or voice recognition software. As a result, there may be errors in the script that have gone undetected. Please consider this when interpreting information found in this chart.    Doug Godoy MD  [phone]

## 2017-05-16 ASSESSMENT — PATIENT HEALTH QUESTIONNAIRE - PHQ9: SUM OF ALL RESPONSES TO PHQ QUESTIONS 1-9: 8

## 2017-05-17 NOTE — PROGRESS NOTES
Nba Muñiz,    I have had the opportunity to review your recent results and an interpretation is as follows:  Your follow-up liver function tests and pancreatic enzymes returned within normal limits     Sincerely,  Doug Godoy MD

## 2017-05-19 ENCOUNTER — HOSPITAL ENCOUNTER (OUTPATIENT)
Dept: ULTRASOUND IMAGING | Facility: CLINIC | Age: 57
Discharge: HOME | End: 2017-05-19
Attending: INTERNAL MEDICINE | Admitting: INTERNAL MEDICINE
Payer: MEDICARE

## 2017-05-19 ENCOUNTER — TELEPHONE (OUTPATIENT)
Dept: PULMONOLOGY | Facility: CLINIC | Age: 57
End: 2017-05-19

## 2017-05-19 DIAGNOSIS — K59.00 CONSTIPATION, UNSPECIFIED CONSTIPATION TYPE: ICD-10-CM

## 2017-05-19 DIAGNOSIS — K21.00 GASTROESOPHAGEAL REFLUX DISEASE WITH ESOPHAGITIS: ICD-10-CM

## 2017-05-19 PROCEDURE — 76700 US EXAM ABDOM COMPLETE: CPT

## 2017-05-19 NOTE — TELEPHONE ENCOUNTER
Contacted by [employer] to recertify oxygen. Sent last testing(6 min walk) 1/22/16 and closest provider note(face to face) to Jan of 2017 stating oxygen need and usage. Sent note from Feb of 2017. Had Dr Mason sign form sent from [employer].

## 2017-05-21 PROBLEM — K76.0 NAFLD (NONALCOHOLIC FATTY LIVER DISEASE): Status: ACTIVE | Noted: 2017-05-21

## 2017-05-21 NOTE — PROGRESS NOTES
Nba Muñiz,    I have had the opportunity to review your recent results and an interpretation is as follows:  Your abdominal ultrasound shows no acute concerns, but does confirm presence of non-alcoholic fatty liver disease and this is something that should be closely monitored.  As you may be aware the treatment for this involves improvement in diet and weight loss.     Sincerely,  Doug Godoy MD

## 2017-05-23 ENCOUNTER — TRANSFERRED RECORDS (OUTPATIENT)
Dept: HEALTH INFORMATION MANAGEMENT | Facility: CLINIC | Age: 57
End: 2017-05-23

## 2017-05-23 ENCOUNTER — TELEPHONE (OUTPATIENT)
Dept: FAMILY MEDICINE | Facility: CLINIC | Age: 57
End: 2017-05-23

## 2017-05-23 NOTE — TELEPHONE ENCOUNTER
Called patient and she wants to know what having a fatty liver means.  Please advise.   Nesha Dodson MA

## 2017-05-23 NOTE — TELEPHONE ENCOUNTER
Reason for Call:  Other call back    Detailed comments: Patient would like call back to go over results     Phone Number Patient can be reached at: Home number on file [phone] (home)    Best Time: anytime     Can we leave a detailed message on this number? YES    Call taken on 5/23/2017 at 12:23 PM by Ankush Ocampo

## 2017-05-23 NOTE — TELEPHONE ENCOUNTER
I called and explained the diagnosis of non-alcoholic fatty liver disease to Mary Kay Deal and informed her that dietary changes would be imperative for management of this condition.    Doug Godoy MD

## 2017-06-14 ENCOUNTER — TELEPHONE (OUTPATIENT)
Dept: PULMONOLOGY | Facility: CLINIC | Age: 57
End: 2017-06-14

## 2017-06-14 NOTE — TELEPHONE ENCOUNTER
-Patient did not have office visit between 3/16/17-4/16/17. Faxed last telephone call stating times and dates of all notes and testing sent for recent office visit.        ---- Message from Angel Corona RN sent at 6/14/2017  9:50 AM CDT -----  Regarding: FW: office notes to be faxed      ----- Message -----     From: Yara Aldrich     Sent: 6/14/2017   9:43 AM       To: Pulmonary Nurses-  Subject: office notes to be faxed                         Liliya from [facility] calling to request office notes from 03/16/17-04/16/17 for DME. You can fax to [phone]. To process the claims for her DME.     ThanksYara  Call ctr  Please DO NOT send message and or reply back to sender. Call center Representatives DO NOT respond to Messages      I didn't get the phone number to call back. I had a hard time understanding her on the call.

## 2017-07-05 DIAGNOSIS — F32.0 MAJOR DEPRESSIVE DISORDER, SINGLE EPISODE, MILD (H): ICD-10-CM

## 2017-07-05 DIAGNOSIS — J44.9 COPD (CHRONIC OBSTRUCTIVE PULMONARY DISEASE) (H): ICD-10-CM

## 2017-07-05 RX ORDER — FLUTICASONE PROPIONATE 50 MCG
1 SPRAY, SUSPENSION (ML) NASAL 2 TIMES DAILY
Qty: 16 G | Refills: 7 | Status: SHIPPED | OUTPATIENT
Start: 2017-07-05 | End: 2018-03-26

## 2017-07-05 NOTE — TELEPHONE ENCOUNTER
Pending Prescriptions:                       Disp   Refills    sertraline (ZOLOFT) 50 MG tablet          135 ta*3            Sig: TAKE ONE AND ONE-HALF TABLET BY MOUTH EVERY DAY         Last Written Prescription Date: 7-1-16  Last Fill Quantity: 135, # refills: 3  Last Office Visit with [facility] primary care provider:  5-15-17 Jayne        Last PHQ-9 score on record=   PHQ-9 SCORE 5/15/2017   Total Score -   Total Score 8       RT Lesli (R)

## 2017-07-06 NOTE — TELEPHONE ENCOUNTER
Due for annual visit.  Medication is being filled for 1 time refill only due to:  Patient needs to be seen because it has been more than one year since last visit.   Marry Beard RN

## 2017-07-25 DIAGNOSIS — C34.12 MALIGNANT NEOPLASM OF UPPER LOBE OF LEFT LUNG (H): Primary | ICD-10-CM

## 2017-08-11 ENCOUNTER — OFFICE VISIT (OUTPATIENT)
Dept: PULMONOLOGY | Facility: CLINIC | Age: 57
End: 2017-08-11
Attending: INTERNAL MEDICINE
Payer: MEDICARE

## 2017-08-11 VITALS
BODY MASS INDEX: 31.45 KG/M2 | OXYGEN SATURATION: 95 % | SYSTOLIC BLOOD PRESSURE: 132 MMHG | WEIGHT: 189 LBS | HEART RATE: 89 BPM | DIASTOLIC BLOOD PRESSURE: 89 MMHG

## 2017-08-11 DIAGNOSIS — J43.2 CENTRILOBULAR EMPHYSEMA (H): ICD-10-CM

## 2017-08-11 DIAGNOSIS — J44.9 CHRONIC OBSTRUCTIVE PULMONARY DISEASE, UNSPECIFIED COPD TYPE (H): ICD-10-CM

## 2017-08-11 DIAGNOSIS — C34.10: Primary | ICD-10-CM

## 2017-08-11 DIAGNOSIS — J44.9 CHRONIC AIRWAY OBSTRUCTION (H): ICD-10-CM

## 2017-08-11 PROCEDURE — 99212 OFFICE O/P EST SF 10 MIN: CPT | Mod: ZF

## 2017-08-11 RX ORDER — MONTELUKAST SODIUM 10 MG/1
10 TABLET ORAL EVERY EVENING
Qty: 30 TABLET | COMMUNITY
Start: 2017-08-11 | End: 2017-09-14

## 2017-08-11 RX ORDER — PREDNISONE 10 MG/1
TABLET ORAL
Qty: 23 TABLET | Refills: 1 | Status: SHIPPED | OUTPATIENT
Start: 2017-08-11 | End: 2018-03-26

## 2017-08-11 RX ORDER — BUDESONIDE 1 MG/2ML
1 INHALANT ORAL 2 TIMES DAILY
COMMUNITY
Start: 2017-08-11 | End: 2018-03-26

## 2017-08-11 RX ORDER — ARFORMOTEROL TARTRATE 15 UG/2ML
15 SOLUTION RESPIRATORY (INHALATION) 2 TIMES DAILY
Qty: 120 ML | Refills: 11 | COMMUNITY
Start: 2017-08-11 | End: 2018-03-26

## 2017-08-11 ASSESSMENT — PAIN SCALES - GENERAL: PAINLEVEL: MILD PAIN (3)

## 2017-08-11 NOTE — LETTER
8/11/2017       RE: Mary Kay Deal  [address]  [facility] [address]     Dear Colleague,    Thank you for referring your patient, Mary Kay Deal, to the [facility] at [facility]. Please see a copy of my visit note below.    Reason for Visit  Mary Kay Deal is a 57 year old female who is being seen for COPD (Patient is being seen for COPD follow up. Would like refill of doxy and prednisone to have on hand for acute exacerbation)    Pulmonary HPI  The patient was seen and examined by Keith Mason MD     Mary Kay Deal is a 57 year old female with severe COPD on home O2.  She was empirically treated with SBRT for possible lung cancer (non biopsy proven) in the left UL in 2013. She has multiple pulmonary nodules.    Interval History:    Since her last appointment she did take a few doses of prednisone for nasal congestion which leads to chest congestion after a few days. She reports it feels as though something is in her chest, and this improves when she can expectorate sputum. The patient has not required an entire course of prednisone, she will just take it until her symptoms improve. The patient reports increased sinus symptoms, both PND and rhinorrhea, which increases when she walks around. She has not tried using a Netipot.     The patient's cough is usually dry. Occasionally she is able to expectorate thick yellow sputum, however this process is usually painful. She has been wheezing occasionally, but increased from her baseline. The patient will experience dyspnea with mild exertion, e.g. walking 20 feet from the car to her house or climbing 1 flight of stairs. Her exercise tolerance worsens with humidity.The patient reports she has not been getting regular exercise recently. Occasional mild LE edema.     She was seen by a GI specialist who wondered if the patient was swallowing oxygen. The patient was encouraged to try the FODMAP diet  however has not started this yet. She estimates having reflux about 1-2 times/week. The patient will have some reflux when she first goes to bed but will not notice it for the rest of the night. The patient continues to sleep with HOB elevation. She eats about 5 hours before going to bed, but will have night time snacks as well.     O2 requirements/use:   The patient uses continuous flow when she is at rest at home and demand flow when she is out of the house. The patient leaves it at 3lpm at rest. While she is walking she requires 3-4lpm. When she is sleeping she uses 2.5-3 lpm supplemental O2. She does not sleep as well when she turns it up to 4lpm.    Current Outpatient Prescriptions   Medication     arformoterol (BROVANA) 15 MCG/2ML NEBU neb solution     budesonide (PULMICORT) 1 MG/2ML SUSP neb solution     montelukast (SINGULAIR) 10 MG tablet     sertraline (ZOLOFT) 50 MG tablet     fluticasone (FLONASE) 50 MCG/ACT spray     VENTOLIN  (90 BASE) MCG/ACT Inhaler     LORazepam (ATIVAN) 0.5 MG tablet     azithromycin (ZITHROMAX) 250 MG tablet     simvastatin (ZOCOR) 20 MG tablet     tiotropium (SPIRIVA HANDIHALER) 18 MCG inhalation capsule     omeprazole (PRILOSEC) 20 MG capsule     [place] [facility]     Respiratory Therapy Supplies (NEBULIZER COMPRESSOR) KIT     [facility] (FINGERTIP PULSE OXIMETER) MISC     Chlorpheniramine-DM 4-20 MG TABS     Cholecalciferol (VITAMIN D) 1000 UNIT capsule     Calcium (CALCIUM 600) 600 MG tablet     ORDER FOR DME     MULTIVITAMIN TABS   OR     SENNA LAXATIVE OR     doxycycline (VIBRAMYCIN) 100 MG capsule     predniSONE (DELTASONE) 10 MG tablet     [DISCONTINUED] arformoterol (BROVANA) 15 MCG/2ML NEBU     No current facility-administered medications for this visit.      Allergies   Allergen Reactions     Penicillins Hives     Past Medical History:   Diagnosis Date     Aspergillosis, with pneumonia (H)      Cancer (H)     questionable lung CA     Chronic infection     Known  fungal lung condition     Constipation      Disorder of bone and cartilage, unspecified      Emphysema      GERD      Hx of radiation therapy     For lung mass     Mild intermittent asthma      Other dyspnea and respiratory abnormality      Other malaise and fatigue      SMOKER        Past Surgical History:   Procedure Laterality Date     BREAST SURGERY      Tumor removal at the age of 15     COLONOSCOPY N/A 2014    Procedure: COLONOSCOPY;  Surgeon: Patricia Perry MD;  Location:  OR     COLONOSCOPY N/A 2015    Procedure: COMBINED COLONOSCOPY, SINGLE OR MULTIPLE BIOPSY/POLYPECTOMY BY BIOPSY;  Surgeon: Patricia Perry MD;  Location:  GI     ENT SURGERY      Tonsillectomy     GYN SURGERY       x 1     SURGICAL HISTORY OF -       s/p Breast tumor @ age 15 - benign     SURGICAL HISTORY OF -       c section     SURGICAL HISTORY OF -       tonsillectomy       Social History     Social History     Marital status:      Spouse name: N/A     Number of children: N/A     Years of education: N/A     Occupational History     Not on file.     Social History Main Topics     Smoking status: Former Smoker     Packs/day: 1.00     Years: 30.00     Types: Cigarettes     Quit date: 10/21/2005     Smokeless tobacco: Never Used     Alcohol use No      Comment: sober since      Drug use: No     Sexual activity: Yes     Partners: Male      Comment: vas     Other Topics Concern     Parent/Sibling W/ Cabg, Mi Or Angioplasty Before 65f 55m? Yes     Social History Narrative    , 3 children    Retired  at [employer]          The patient is currently on disability due to COPD.  She rides a scooter due to her lungs.  She used to work as a .  She and her  are living with their daughter in [place].  They have a German Corgi mix.  The patient stopped smoking 9 years ago.  She smoked 1-2 packs of cigarettes per day for 30 years, consistent with 45-60 pack year history.   She denies cigars, pipes or chewing tobacco.  She smoked marijuana in her teens.  The patient is an alcoholic and has been sober for the past 27 years.  Caffeine intake includes 4-5 cups of caffeinated coffee every morning.  She does not drink tea.  She consumes 1 can of Diet Pepsi as late as 7:00 p.m.  She eats chocolate candy bars as late as 7:00 p.m.  She does not exercise.  She has a treadmill, so she could; however, as of this time she is not exercising.                ROS Pulmonary  Constitutional- Negative. She has been trying to watch her diet and eat healthier.  Eyes- Negative  Ear, nose and throat- Positive, increased sinus symptoms, see HPI.  Cardiac- Negative  Pulm- See HPI  GI- Positive,   Genitourinary- Negative  Musculoskeletal- Negative.   Neurology- Negative  Dermatology- Negative  Endocrine- Negative  Lymphatic- Negative  Psychiatry- Negative    A complete ROS was otherwise negative except as noted in the HPI.    /89 (BP Location: Right arm, Patient Position: Chair, Cuff Size: Adult Large)  Pulse 89  Wt 85.7 kg (189 lb)  LMP 09/08/2004  SpO2 95%  BMI 31.45 kg/m2  Body mass index is 31.45 kg/(m^2).   Exam:   GENERAL APPEARANCE: Well developed, well nourished, alert, and in no apparent distress.  EYES: PERRL, EOMI  HENT: Nasal mucosa with no edema and no hyperemia. No nasal polyps.  EARS: Canals clear, TMs normal.  MOUTH: Oral mucosa is moist, without any lesions, no tonsillar enlargement, no oropharyngeal exudate.  NECK: supple, no masses, no thyromegaly.  LYMPHATICS: No significant axillary, cervical, or supraclavicular nodes.  RESP: normal percussion, Very diminished air flow throughout.  No crackles. No rhonchi. No wheezes.  CV: Normal S1, S2, regular rhythm, normal rate. No murmur.  No rub. No gallop. No LE edema.     Results:  Recent Results (from the past 168 hour(s))   General PFT Lab (Please always keep checked)    Collection Time: 08/11/17  9:53 AM   Result Value Ref Range     FVC-Pred 3.34 L    FVC-Pre 1.45 L    FVC-%Pred-Pre 43 %    FEV1-Pre 0.42 L    FEV1-%Pred-Pre 16 %    FEV1FVC-Pred 79 %    FEV1FVC-Pre 29 %    FEFMax-Pred 6.54 L/sec    FEFMax-Pre 1.13 L/sec    FEFMax-%Pred-Pre 17 %    FEF2575-Pred 2.40 L/sec    FEF2575-Pre 0.20 L/sec    YXV4743-%Pred-Pre 8 %    ExpTime-Pre 8.21 sec    FIFMax-Pre 2.52 L/sec    FEV1FEV6-Pred 81 %    FEV1FEV6-Pre 32 %       Assessment and plan: Mary Kay Deal is a 57-year-old female has a longstanding history of severe obstructive lung disease who comes today to the clinic for follow up.    1. RUL mass /Aspergillus:  This lesion was biopsied by IR and it showed aspergillus and was treated with one month course of voriconazole. Due to lack of improvement it was stopped as per pt. This has remained stable. This was followed/managed by Dr. Pierre.    2. Emphysema: Very severe lung disease.  - Will cont O2 at night and with activity.  6MWT (1/22/2016): Walked 460 ft on 4 lpm NC. Hence recommended to use this during the day with exertion and while sleeping. Can be on RA at rest.   - Will cont flu vaccine every year and has received pneumonia vaccine 5 years apart x2 doses. Received PCV 13 12/5/2014.  - Cont spiriva and prn nebs/inhalers. Cont Budesonide/Brovana nebs.  - Cont azithromycin daily. Will follow Qtc prn --457msec on 6/13/2015  - She would benefit from doing outpt pulm rehab but she is unable to do it due to lack of transportation. She has a treadmill at home and is currently using it to exercise.  - Cont prn lorazepam.  - Cont singular daily.   2/10/2017: Currently having a COPD exacerbation.  - Start 10 day course of Doxycycline for current exacerbation. Cont prednisone 20 mg daily for 7 more days.   - Advised pt to start prednisone burst at 40 mg and taper the next time she notices increased pulmonary symptoms.   8/11/2017: Will give patient course of doxy and prednisone to have on hand in case of acute exacerbation.   - Encouraged patient to  increase activity to slow decline of lung function.    Insomnia: Sleep study - no MADELIN noted (5/2014).    3. Pulmonary nodules:    - Chest CT done on 08/3/12 showed a  New left UL nodule that increased in size when followed. This was reviewed at Pulmonary nodule conference and given the risk of doing biopsy and after seeing Dr. Lorenzo she underwent SBRT. SBRT 5000cGy in 5 fractions over 3 months - last dose on 08/29/13.  Chest CT (1/2014):  1.Prominent spiculated mass, inflammatory process or scarring in the right lung apex appears not significantly changed. 2. Decrease in the prominence of the patchy opacities in the left upper lobe, presumed postradiation changes.  Chest CT (10/2014): 1. Spiculated right apical mass is not significantly changed in size. 2. Some new or increased nodular densities from 07/08/2014. These include a lateral right lower lobe subpleural focus, and anterolateral left apical focus, and superior segment posterior lower lobe focus.  Chest CT 7/17/2015: 1. New findings on the 4/21/2015 chest CT have essentially resolved, including consolidation in the right middle lobe, left lower lobe, and a new right lower lobe pulmonary nodule. 2. There is a 3 mm left lower lobe pulmonary nodule that was new on the 7/8/2014 exam, and therefore demonstrates one-year of stability. Continued followup per Fleischner Society guidelines is recommended. 3. Centrilobular emphysema. 4. Small hiatal hernia.  Chest CT (1/22/16): Stable pulm nodules.  Chest CT (8/5/2016): Stable pulm nodules.  Chest CT (3/15/17): Stable pulmonary nodules and emphysematous change. 2. Possible metallic foreign body in the transverse colon, of  uncertain clinical significance.  --  She is followed by Radiation oncology and they will continue to determine appropriate imaging follow up. She will get a Chest CT in one month. We will at the minimum need yearly Chest CT scans.    4. Allergies:   - Cont flonase 1 spray each nostril BID.   -  To do netipot (nasal rinses) prn. Patient has not started nasal rinses.   - Cont Singulair.    5. Obesity:   -  Strongly advised to loose weight.   - Encouraged patient to eat healthier and gradually increase her exercise for weight loss.    6. GERD:   - Controlled on PPI at 20mg daily.   - Seen by GI for this and constipation. Recommended FODMAP diet trial, which pt has not instituted yet.    7. H/o Lung ca:  Continue follow up with Radiation oncology, they will decide about treatment regimen and CT's. This was Left UL lesion.    8. Recurrent episodes of voice loss: Improved, less often.   - Sputum cultures from 1/5/2015 were negative.    - Pt can continue to follow up with ENT.     9. Rt neck pain:  Pain has been present for the past few weeks, has had similar pain in the past. No abnormalities noted on physical exam.  - If this persists, encouraged patient to see PCP for further evaluation.      F/u in six months with Spirometry. Will try to coordinate appointment with oncology (Tuesdays).    No orders of the defined types were placed in this encounter.     Scribe Disclosure:   I, Dania Abraham, am serving as a scribe; to document services personally performed by KEITH MASON MD based on data collection and the provider's statements to me.     Provider Disclosure:  I agree with above History, Review of Systems, Physical exam and Plan.  I have reviewed the content of the documentation and have edited it as needed. I have personally performed the services documented here and the documentation accurately represents those services and the decisions I have made.      Electronically signed by:  Keith Mason MD.

## 2017-08-11 NOTE — NURSING NOTE
Chief Complaint   Patient presents with     COPD     Patient is being seen for COPD follow up      Mary Kay Cabrera CMA at 10:30 AM on 8/11/2017

## 2017-08-11 NOTE — MR AVS SNAPSHOT
After Visit Summary   8/11/2017    Mary Kay Deal    MRN: [id number]           Patient Information     Date Of Birth          1960        Visit Information        Provider Department      8/11/2017 10:30 AM Keith Mason MD [facility]        Today's Diagnoses     Centrilobular emphysema (H)        Chronic obstructive pulmonary disease, unspecified COPD type (H)           Follow-ups after your visit        Your next 10 appointments already scheduled     Sep 18, 2017 11:00 AM CDT   CT CHEST W/O CONTRAST with SHCT1   [facility] CT ([facility])    [address] [place] [address] [phone]           Please bring any scans or X-rays taken at other hospitals, if similar tests were done. Also bring a list of your medicines, including vitamins, minerals and over-the-counter drugs. It is safest to leave personal items at home.  Be sure to tell your doctor:   If you have any allergies.   If there s any chance you are pregnant.   If you are breastfeeding.   If you have any special needs.  You do not need to do anything special to prepare.  Please wear loose clothing, such as a sweat suit or jogging clothes. Avoid snaps, zippers and other metal. We may ask you to undress and put on a hospital gown.            Sep 20, 2017 11:30 AM CDT   Return Visit with Merrill Nevarez MD   Radiation Oncology Clinic ([facility])    [facility]  1st Floor  [address] [facility] [address] [phone]              Future tests that were ordered for you today     Open Future Orders        Priority Expected Expires Ordered    RESPIRATORY FLOW VOLUME LOOP Routine 2/11/2018 2/11/2018 8/11/2017            Who to contact     If you have questions or need follow up information about today's clinic visit or your schedule please contact [facility] directly at  [phone].  Normal or non-critical lab and imaging results will be communicated to you by MyChart, letter or phone within 4 business days after the clinic has received the results. If you do not hear from us within 7 days, please contact the clinic through [employer] or phone. If you have a critical or abnormal lab result, we will notify you by phone as soon as possible.  Submit refill requests through [employer] or call your pharmacy and they will forward the refill request to us. Please allow 3 business days for your refill to be completed.          Additional Information About Your Visit        [employer] Information     [employer] gives you secure access to your electronic health record. If you see a primary care provider, you can also send messages to your care team and make appointments. If you have questions, please call your primary care clinic.  If you do not have a primary care provider, please call [phone] and they will assist you.        Care EveryWhere ID     This is your Care EveryWhere ID. This could be used by other organizations to access your [facility] medical records  [id number]        Your Vitals Were     Pulse Last Period Pulse Oximetry BMI (Body Mass Index)          89 09/08/2004 95% 31.45 kg/m2         Blood Pressure from Last 3 Encounters:   08/11/17 132/89   05/15/17 137/82   02/10/17 123/85    Weight from Last 3 Encounters:   08/11/17 85.7 kg (189 lb)   05/15/17 87.1 kg (192 lb)   02/10/17 87.1 kg (192 lb)                 Where to get your medicines      These medications were sent to [facility] [place], [place] - [address], [address]  [address], [address], [place] [address]     Phone:  [phone]     predniSONE 10 MG tablet          Primary Care Provider Office Phone # Fax #    Doug Godoy [phone] [address]  [facility] [address]        Equal Access to Services     NIDHI PARKINSON AH: Myke Rosa  reyda nolashandra, qaybta kaeleazar rogers, jarvis martinezaaaugusto ah. So [facility] [place] [phone].    ATENCIÓN: Si darius thornton, tiene a rene disposición servicios gratuitos de asistencia lingüística. Brandon al [phone].    We comply with applicable federal civil rights laws and [place] laws. We do not discriminate on the basis of race, color, national origin, age, disability sex, sexual orientation or gender identity.            Thank you!     Thank you for choosing [facility]  for your care. Our goal is always to provide you with excellent care. Hearing back from our patients is one way we can continue to improve our services. Please take a few minutes to complete the written survey that you may receive in the mail after your visit with us. Thank you!             Your Updated Medication List - Protect others around you: Learn how to safely use, store and throw away your medicines at www.disposemymeds.org.          This list is accurate as of: 8/11/17 11:36 AM.  Always use your most recent med list.                   Brand Name Dispense Instructions for use Diagnosis    arformoterol 15 MCG/2ML Nebu neb solution    BROVANA    120 mL    Take 2 mLs (15 mcg) by nebulization 2 times daily    Centrilobular emphysema (H)       azithromycin 250 MG tablet    ZITHROMAX    30 tablet    TAKE ONE TABLET BY MOUTH EVERY DAY AS ANTIINFAMMATORY FOR COPD    COPD (chronic obstructive pulmonary disease) (H)       budesonide 1 MG/2ML Susp neb solution    PULMICORT     Take 2 mLs (1 mg) by nebulization 2 times daily    Centrilobular emphysema (H)       calcium carbonate 600 MG tablet   Generic drug:  calcium      Take 1 tablet by mouth 2 times daily. 1-2 tablets as tolerated        Chlorpheniramine-DM 4-20 MG Tabs     84 tablet    Take 1 tablet by mouth every 6 hours as needed.    COPD (chronic obstructive pulmonary disease) (H)       doxycycline 100 MG capsule    VIBRAMYCIN    20 capsule     Take 1 capsule (100 mg) by mouth 2 times daily    Emphysema of lung (H)       Fingertip Pulse Oximeter Misc     1 each    To be used as needed - for SOB.    COPD (chronic obstructive pulmonary disease) (H), Respiratory failure with hypoxia (H)       fluticasone 50 MCG/ACT spray    FLONASE    16 g    Spray 1 spray into both nostrils 2 times daily    COPD (chronic obstructive pulmonary disease) (H)       LORazepam 0.5 MG tablet    ATIVAN    30 tablet    Take 1 tablet (0.5 mg) by mouth every 8 hours as needed for anxiety    Anxiety       montelukast 10 MG tablet    SINGULAIR    30 tablet    Take 1 tablet (10 mg) by mouth every evening        MULTIVITAMIN TABS   OR      1 TABLET BY MOUTH DAILY        Nebulizer Compressor Kit     1 kit    1 Device 4 times daily    COPD (chronic obstructive pulmonary disease) (H)       omeprazole 20 MG CR capsule    priLOSEC    180 capsule    TAKE ONE CAPSULE BY MOUTH TWICE A DAY    Gastroesophageal reflux disease with esophagitis       order for DME      O2 at night and prn        order for DME     1 Device    Equipment being ordered: Oxygen --Please provide oxygen at 4 LPM via nasal canula with exertion and with sleep. Can be on room air at rest. Please provide portability. Keep oxygen saturations above 90%.    COPD (chronic obstructive pulmonary disease) (H)       predniSONE 10 MG tablet    DELTASONE    23 tablet    Take 40 mg x 2 days, 30 mg x 2 days, 20 mg x 3 days and 10 mg x 3 days    Chronic obstructive pulmonary disease, unspecified COPD type (H)       SENNA LAXATIVE PO      2 TABLET BY MOUTH DAILY AS NEEDED FOR CONSTIPATION        sertraline 50 MG tablet    ZOLOFT    45 tablet    TAKE ONE AND ONE-HALF TABLET BY MOUTH EVERY DAY    Major depressive disorder, single episode, mild (H)       simvastatin 20 MG tablet    ZOCOR    90 tablet    TAKE ONE TABLET BY MOUTH AT BEDTIME    Hyperlipidemia LDL goal <130       tiotropium 18 MCG capsule    SPIRIVA HANDIHALER    90 capsule     Inhale 1 capsule (18 mcg) into the lungs daily    COPD (chronic obstructive pulmonary disease) (H)       VENTOLIN  (90 BASE) MCG/ACT Inhaler   Generic drug:  albuterol     54 g    INHALE 2 PUFFS BY MOUTH INTO THE LUNGS EVERY 4 HOURS AS NEEDED    COPD (chronic obstructive pulmonary disease) (H), Oral thrush       vitamin D 1000 UNITS capsule      Take 1 capsule by mouth 2 times daily.

## 2017-08-11 NOTE — PROGRESS NOTES
Reason for Visit  Mary Kay Deal is a 57 year old female who is being seen for COPD (Patient is being seen for COPD follow up. Would like refill of doxy and prednisone to have on hand for acute exacerbation)    Pulmonary HPI  The patient was seen and examined by Keith Mason MD     Mary Kay Deal is a 57 year old female with severe COPD on home O2.  She was empirically treated with SBRT for possible lung cancer (non biopsy proven) in the left UL in 2013. She has multiple pulmonary nodules.    Interval History:    Since her last appointment she did take a few doses of prednisone for nasal congestion which leads to chest congestion after a few days. She reports it feels as though something is in her chest, and this improves when she can expectorate sputum. The patient has not required an entire course of prednisone, she will just take it until her symptoms improve. The patient reports increased sinus symptoms, both PND and rhinorrhea, which increases when she walks around. She has not tried using a Netipot.     The patient's cough is usually dry. Occasionally she is able to expectorate thick yellow sputum, however this process is usually painful. She has been wheezing occasionally, but increased from her baseline. The patient will experience dyspnea with mild exertion, e.g. walking 20 feet from the car to her house or climbing 1 flight of stairs. Her exercise tolerance worsens with humidity.The patient reports she has not been getting regular exercise recently. Occasional mild LE edema.     She was seen by a GI specialist who wondered if the patient was swallowing oxygen. The patient was encouraged to try the FODMAP diet however has not started this yet. She estimates having reflux about 1-2 times/week. The patient will have some reflux when she first goes to bed but will not notice it for the rest of the night. The patient continues to sleep with HOB elevation. She eats about 5 hours before going to bed, but will  have night time snacks as well.     O2 requirements/use:   The patient uses continuous flow when she is at rest at home and demand flow when she is out of the house. The patient leaves it at 3lpm at rest. While she is walking she requires 3-4lpm. When she is sleeping she uses 2.5-3 lpm supplemental O2. She does not sleep as well when she turns it up to 4lpm.    Current Outpatient Prescriptions   Medication     arformoterol (BROVANA) 15 MCG/2ML NEBU neb solution     budesonide (PULMICORT) 1 MG/2ML SUSP neb solution     montelukast (SINGULAIR) 10 MG tablet     sertraline (ZOLOFT) 50 MG tablet     fluticasone (FLONASE) 50 MCG/ACT spray     VENTOLIN  (90 BASE) MCG/ACT Inhaler     LORazepam (ATIVAN) 0.5 MG tablet     azithromycin (ZITHROMAX) 250 MG tablet     simvastatin (ZOCOR) 20 MG tablet     tiotropium (SPIRIVA HANDIHALER) 18 MCG inhalation capsule     omeprazole (PRILOSEC) 20 MG capsule     order for [facility]     Respiratory Therapy Supplies (NEBULIZER COMPRESSOR) KIT     Mis. Devices (FINGERTIP PULSE OXIMETER) MISC     Chlorpheniramine-DM 4-20 MG TABS     Cholecalciferol (VITAMIN D) 1000 UNIT capsule     Calcium (CALCIUM 600) 600 MG tablet     ORDER FOR DME     MULTIVITAMIN TABS   OR     SENNA LAXATIVE OR     doxycycline (VIBRAMYCIN) 100 MG capsule     predniSONE (DELTASONE) 10 MG tablet     [DISCONTINUED] arformoterol (BROVANA) 15 MCG/2ML NEBU     No current facility-administered medications for this visit.      Allergies   Allergen Reactions     Penicillins Hives     Past Medical History:   Diagnosis Date     Aspergillosis, with pneumonia (H)      Cancer (H)     questionable lung CA     Chronic infection     Known fungal lung condition     Constipation      Disorder of bone and cartilage, unspecified      Emphysema      GERD      Hx of radiation therapy 2013    For lung mass     Mild intermittent asthma      Other dyspnea and respiratory abnormality      Other malaise and fatigue      SMOKER        Past  Surgical History:   Procedure Laterality Date     BREAST SURGERY      Tumor removal at the age of 15     COLONOSCOPY N/A 2014    Procedure: COLONOSCOPY;  Surgeon: Patricia Perry MD;  Location:  OR     COLONOSCOPY N/A 2015    Procedure: COMBINED COLONOSCOPY, SINGLE OR MULTIPLE BIOPSY/POLYPECTOMY BY BIOPSY;  Surgeon: Patricia Perry MD;  Location:  GI     ENT SURGERY      Tonsillectomy     GYN SURGERY       x 1     SURGICAL HISTORY OF -       s/p Breast tumor @ age 15 - benign     SURGICAL HISTORY OF -       c section     SURGICAL HISTORY OF -       tonsillectomy       Social History     Social History     Marital status:      Spouse name: N/A     Number of children: N/A     Years of education: N/A     Occupational History     Not on file.     Social History Main Topics     Smoking status: Former Smoker     Packs/day: 1.00     Years: 30.00     Types: Cigarettes     Quit date: 10/21/2005     Smokeless tobacco: Never Used     Alcohol use No      Comment: sober since      Drug use: No     Sexual activity: Yes     Partners: Male      Comment: vas     Other Topics Concern     Parent/Sibling W/ Cabg, Mi Or Angioplasty Before 65f 55m? Yes     Social History Narrative    , 3 children    Retired  at [employer]          The patient is currently on disability due to COPD.  She rides a scooter due to her lungs.  She used to work as a .  She and her  are living with their daughter in [place].  They have a [place] [employer] mix.  The patient stopped smoking 9 years ago.  She smoked 1-2 packs of cigarettes per day for 30 years, consistent with 45-60 pack year history.  She denies cigars, pipes or chewing tobacco.  She smoked marijuana in her teens.  The patient is an alcoholic and has been sober for the past 27 years.  Caffeine intake includes 4-5 cups of caffeinated coffee every morning.  She does not drink tea.  She consumes 1 can of Diet Pepsi as late as  7:00 p.m.  She eats chocolate candy bars as late as 7:00 p.m.  She does not exercise.  She has a treadmill, so she could; however, as of this time she is not exercising.                ROS Pulmonary  Constitutional- Negative. She has been trying to watch her diet and eat healthier.  Eyes- Negative  Ear, nose and throat- Positive, increased sinus symptoms, see HPI.  Cardiac- Negative  Pulm- See HPI  GI- Positive,   Genitourinary- Negative  Musculoskeletal- Negative.   Neurology- Negative  Dermatology- Negative  Endocrine- Negative  Lymphatic- Negative  Psychiatry- Negative    A complete ROS was otherwise negative except as noted in the HPI.    /89 (BP Location: Right arm, Patient Position: Chair, Cuff Size: Adult Large)  Pulse 89  Wt 85.7 kg (189 lb)  LMP 09/08/2004  SpO2 95%  BMI 31.45 kg/m2  Body mass index is 31.45 kg/(m^2).   Exam:   GENERAL APPEARANCE: Well developed, well nourished, alert, and in no apparent distress.  EYES: PERRL, EOMI  HENT: Nasal mucosa with no edema and no hyperemia. No nasal polyps.  EARS: Canals clear, TMs normal.  MOUTH: Oral mucosa is moist, without any lesions, no tonsillar enlargement, no oropharyngeal exudate.  NECK: supple, no masses, no thyromegaly.  LYMPHATICS: No significant axillary, cervical, or supraclavicular nodes.  RESP: normal percussion, Very diminished air flow throughout.  No crackles. No rhonchi. No wheezes.  CV: Normal S1, S2, regular rhythm, normal rate. No murmur.  No rub. No gallop. No LE edema.     Results:  Recent Results (from the past 168 hour(s))   General PFT Lab (Please always keep checked)    Collection Time: 08/11/17  9:53 AM   Result Value Ref Range    FVC-Pred 3.34 L    FVC-Pre 1.45 L    FVC-%Pred-Pre 43 %    FEV1-Pre 0.42 L    FEV1-%Pred-Pre 16 %    FEV1FVC-Pred 79 %    FEV1FVC-Pre 29 %    FEFMax-Pred 6.54 L/sec    FEFMax-Pre 1.13 L/sec    FEFMax-%Pred-Pre 17 %    FEF2575-Pred 2.40 L/sec    FEF2575-Pre 0.20 L/sec    OXP5878-%Pred-Pre 8 %     ExpTime-Pre 8.21 sec    FIFMax-Pre 2.52 L/sec    FEV1FEV6-Pred 81 %    FEV1FEV6-Pre 32 %       Assessment and plan: Mary Kay Deal is a 57-year-old female has a longstanding history of severe obstructive lung disease who comes today to the clinic for follow up.    1. RUL mass /Aspergillus:  This lesion was biopsied by IR and it showed aspergillus and was treated with one month course of voriconazole. Due to lack of improvement it was stopped as per pt. This has remained stable. This was followed/managed by Dr. Pierre.    2. Emphysema: Very severe lung disease.  - Will cont O2 at night and with activity.  6MWT (1/22/2016): Walked 460 ft on 4 lpm NC. Hence recommended to use this during the day with exertion and while sleeping. Can be on RA at rest.   - Will cont flu vaccine every year and has received pneumonia vaccine 5 years apart x2 doses. Received PCV 13 12/5/2014.  - Cont spiriva and prn nebs/inhalers. Cont Budesonide/Brovana nebs.  - Cont azithromycin daily. Will follow Qtc prn --457msec on 6/13/2015  - She would benefit from doing outpt pulm rehab but she is unable to do it due to lack of transportation. She has a treadmill at home and is currently using it to exercise.  - Cont prn lorazepam.  - Cont singular daily.   2/10/2017: Currently having a COPD exacerbation.  - Start 10 day course of Doxycycline for current exacerbation. Cont prednisone 20 mg daily for 7 more days.   - Advised pt to start prednisone burst at 40 mg and taper the next time she notices increased pulmonary symptoms.   8/11/2017: Will give patient course of doxy and prednisone to have on hand in case of acute exacerbation.   - Encouraged patient to increase activity to slow decline of lung function.    Insomnia: Sleep study - no MADELIN noted (5/2014).    3. Pulmonary nodules:    - Chest CT done on 08/3/12 showed a  New left UL nodule that increased in size when followed. This was reviewed at Pulmonary nodule conference and given the risk of  doing biopsy and after seeing Dr. Lorenzo she underwent SBRT. SBRT 5000cGy in 5 fractions over 3 months - last dose on 08/29/13.  Chest CT (1/2014):  1.Prominent spiculated mass, inflammatory process or scarring in the right lung apex appears not significantly changed. 2. Decrease in the prominence of the patchy opacities in the left upper lobe, presumed postradiation changes.  Chest CT (10/2014): 1. Spiculated right apical mass is not significantly changed in size. 2. Some new or increased nodular densities from 07/08/2014. These include a lateral right lower lobe subpleural focus, and anterolateral left apical focus, and superior segment posterior lower lobe focus.  Chest CT 7/17/2015: 1. New findings on the 4/21/2015 chest CT have essentially resolved, including consolidation in the right middle lobe, left lower lobe, and a new right lower lobe pulmonary nodule. 2. There is a 3 mm left lower lobe pulmonary nodule that was new on the 7/8/2014 exam, and therefore demonstrates one-year of stability. Continued followup per Fleischner Society guidelines is recommended. 3. Centrilobular emphysema. 4. Small hiatal hernia.  Chest CT (1/22/16): Stable pulm nodules.  Chest CT (8/5/2016): Stable pulm nodules.  Chest CT (3/15/17): Stable pulmonary nodules and emphysematous change. 2. Possible metallic foreign body in the transverse colon, of  uncertain clinical significance.  --  She is followed by Radiation oncology and they will continue to determine appropriate imaging follow up. She will get a Chest CT in one month. We will at the minimum need yearly Chest CT scans.    4. Allergies:   - Cont flonase 1 spray each nostril BID.   - To do netipot (nasal rinses) prn. Patient has not started nasal rinses.   - Cont Singulair.    5. Obesity:   -  Strongly advised to loose weight.   - Encouraged patient to eat healthier and gradually increase her exercise for weight loss.    6. GERD:   - Controlled on PPI at 20mg daily.   - Seen  by GI for this and constipation. Recommended FODMAP diet trial, which pt has not instituted yet.    7. H/o Lung ca:  Continue follow up with Radiation oncology, they will decide about treatment regimen and CT's. This was Left UL lesion.    8. Recurrent episodes of voice loss: Improved, less often.   - Sputum cultures from 1/5/2015 were negative.    - Pt can continue to follow up with ENT.     9. Rt neck pain:  Pain has been present for the past few weeks, has had similar pain in the past. No abnormalities noted on physical exam.  - If this persists, encouraged patient to see PCP for further evaluation.      F/u in six months with Spirometry. Will try to coordinate appointment with oncology (Tuesdays).    No orders of the defined types were placed in this encounter.     Scribe Disclosure:   I, Dania Abraham, am serving as a scribe; to document services personally performed by KEITH MASON MD based on data collection and the provider's statements to me.     Provider Disclosure:  I agree with above History, Review of Systems, Physical exam and Plan.  I have reviewed the content of the documentation and have edited it as needed. I have personally performed the services documented here and the documentation accurately represents those services and the decisions I have made.      Electronically signed by:  Keith Mason MD.

## 2017-08-16 LAB
EXPTIME-PRE: 8.21 SEC
FEF2575-%PRED-PRE: 8 %
FEF2575-PRE: 0.2 L/SEC
FEF2575-PRED: 2.4 L/SEC
FEFMAX-%PRED-PRE: 17 %
FEFMAX-PRE: 1.13 L/SEC
FEFMAX-PRED: 6.54 L/SEC
FEV1-%PRED-PRE: 16 %
FEV1-PRE: 0.42 L
FEV1FEV6-PRE: 32 %
FEV1FEV6-PRED: 81 %
FEV1FVC-PRE: 29 %
FEV1FVC-PRED: 79 %
FIFMAX-PRE: 2.52 L/SEC
FVC-%PRED-PRE: 43 %
FVC-PRE: 1.45 L
FVC-PRED: 3.34 L

## 2017-09-05 DIAGNOSIS — F32.0 MAJOR DEPRESSIVE DISORDER, SINGLE EPISODE, MILD (H): ICD-10-CM

## 2017-09-05 DIAGNOSIS — E78.5 HYPERLIPIDEMIA LDL GOAL <130: ICD-10-CM

## 2017-09-05 NOTE — TELEPHONE ENCOUNTER
Zoloft 50mg     Last Written Prescription Date: 7-6-17  Last Fill Quantity: 45, # refills: 0  Last Office Visit with [facility] primary care provider:  10-3-16        Last PHQ-9 score on record=   PHQ-9 SCORE 5/15/2017   Total Score -   Total Score 8           Zocor 20MG     Last Written Prescription Date: 11-16-16  Last Fill Quantity: 90, # refills: 2  Last Office Visit with [facility], [facility] or [facility] prescribing provider: 10-3-16       Lab Results   Component Value Date    CHOL 198 07/01/2016     Lab Results   Component Value Date    HDL 81 07/01/2016     Lab Results   Component Value Date     07/01/2016     Lab Results   Component Value Date    TRIG 68 07/01/2016     Lab Results   Component Value Date    CHOLHDLRATIO 3.2 06/30/2015       Thank You!  Naomi Contreras  [facility]

## 2017-09-07 RX ORDER — SIMVASTATIN 20 MG
20 TABLET ORAL AT BEDTIME
Qty: 90 TABLET | Refills: 0 | Status: SHIPPED | OUTPATIENT
Start: 2017-09-07 | End: 2017-10-19

## 2017-09-07 NOTE — TELEPHONE ENCOUNTER
Routing refill request to provider for review/approval because:  Patient up to date with OV but due for fasting labs and last PHQ-9 elevated (8).   Xin CHAPA RN

## 2017-09-14 DIAGNOSIS — J43.2 CENTRILOBULAR EMPHYSEMA (H): Primary | ICD-10-CM

## 2017-09-14 NOTE — TELEPHONE ENCOUNTER
Singulair 10 MG      Last Written Prescription Date: n/a  Last Fill Quantity: n/a,  # refills: n/a   Last Office Visit with [facility], [facility] or [facility] prescribing provider: 8-11-17

## 2017-09-16 RX ORDER — MONTELUKAST SODIUM 10 MG/1
10 TABLET ORAL EVERY EVENING
Qty: 30 TABLET | Refills: 11 | Status: SHIPPED | OUTPATIENT
Start: 2017-09-16 | End: 2018-03-26

## 2017-09-18 ENCOUNTER — HOSPITAL ENCOUNTER (OUTPATIENT)
Dept: CT IMAGING | Facility: CLINIC | Age: 57
Discharge: HOME | End: 2017-09-18
Attending: RADIOLOGY | Admitting: RADIOLOGY
Payer: MEDICARE

## 2017-09-18 DIAGNOSIS — C34.12 MALIGNANT NEOPLASM OF UPPER LOBE OF LEFT LUNG (H): ICD-10-CM

## 2017-09-18 PROCEDURE — 71260 CT THORAX DX C+: CPT

## 2017-09-18 PROCEDURE — 25000125 ZZHC RX 250: Performed by: RADIOLOGY

## 2017-09-18 PROCEDURE — 25000128 H RX IP 250 OP 636: Performed by: RADIOLOGY

## 2017-09-18 RX ORDER — IOPAMIDOL 755 MG/ML
75 INJECTION, SOLUTION INTRAVASCULAR ONCE
Status: COMPLETED | OUTPATIENT
Start: 2017-09-18 | End: 2017-09-18

## 2017-09-18 RX ADMIN — IOPAMIDOL 75 ML: 755 INJECTION, SOLUTION INTRAVENOUS at 11:05

## 2017-09-18 RX ADMIN — SODIUM CHLORIDE 70 ML: 9 INJECTION, SOLUTION INTRAVENOUS at 11:06

## 2017-09-20 ENCOUNTER — OFFICE VISIT (OUTPATIENT)
Dept: RADIATION ONCOLOGY | Facility: CLINIC | Age: 57
End: 2017-09-20
Attending: RADIOLOGY
Payer: MEDICARE

## 2017-09-20 VITALS — DIASTOLIC BLOOD PRESSURE: 84 MMHG | HEART RATE: 88 BPM | SYSTOLIC BLOOD PRESSURE: 128 MMHG

## 2017-09-20 DIAGNOSIS — C34.12 MALIGNANT NEOPLASM OF UPPER LOBE OF LEFT LUNG (H): Primary | ICD-10-CM

## 2017-09-20 PROCEDURE — 99213 OFFICE O/P EST LOW 20 MIN: CPT | Performed by: RADIOLOGY

## 2017-09-20 NOTE — PROGRESS NOTES
RADIATION ONCOLOGY FOLLOW-UP  2017  NAME: Mary Kay Deal  MRN: [id number]  : 1960    DISEASE TREATED: Presumed NSCLC of the left upper lobe, [id number]    INTERVAL SINCE COMPLETION OF RADIOTHERAPY: ~4 years (Completed 2013)    TYPE OF RADIOTHERAPY GIVEN: SBRT 5000cGy in 5 fractions, 6MV, 80%IDL    SUBJECTIVE:   Mrs. Deal is a 57 year old woman with history of recurrent fungal infection and non-biopsy proven stage IA lung cancer of the RUBEN, s/p SBRT. She returns for routine follow-up.  The patient had interval CT scans the chest completed on 2017. This is been read as a stable exam without convincing recurrent or residual malignancy in the chest. Personal review of the imaging suggests a continued complete response, with slight interval decrease in the size of the patient's RUBEN lesion.  On exam today, Ms. Deal is overall doing well. She denies any new shortness of breath; she remains on 3L/min of oxygen via concentrator at all times, up to 3-4L with exertion and at night. Her energy level and cough are at baseline, although she states that she has had a bit of an increase in expectorant. She recently discontinue montelukast attempts to decrease her mucous production. She otherwise denies fevers, chills, nausea, vomiting, diarrhea. A complete review of systems was otherwise unremarkable.    OBJECTIVE:  /84  Pulse 88  LMP 2004  Gen: No acute distress. Alert, oriented.   Neck: No palpable LNs in the neck or supraclavicular areas.   CV: Regular rate and rhythm. No murmur.   Lungs: Slight end-expiratory wheeze. Otherwise CTAB    IMAGING:   CT scan 2017:  IMPRESSION: Stable exam without convincing recurrent or residual  malignancy in the chest.    IMPRESSION: Radiographic complete response to SBRT.     RECOMMENDATIONS: We encouraged the patient to contact her former EN provider to discuss possible workup of her increasing expectoration. RTC in 6 months with repeat CT of the  chest without contrast.     Patient seen and discussed with staff, Dr. Nevarez.    Yann Vallejo MD-PhD PGY-2  Radiation Oncology Resident, [facility]    I saw the patient with the resident.  I agree with the resident's note and plan of care.      RENÉ Nevarez M.D.  Department of Radiation Oncology  [facility]

## 2017-09-20 NOTE — PROGRESS NOTES
FOLLOW-UP VISIT    Patient Name: Mary Kay Deal      : 1960     Age: 57 year old        ______________________________________________________________________________     Chief Complaint   Patient presents with     Cancer     4 year fup for SBRT      /84  Pulse 88  LMP 2004     Date Radiation Completed: 2913    Pain  Denies    Labs  Other Labs: No    Imaging  CT: chest            Other Appointments:     MD Name:  Appointment Date:    MD Name: Appointment Date:   MD Name: Appointment Date:   Other Appointment Notes:     Residual Radiation side effect: none     Additional Instructions:     Nurse face-to-face time: Level 3:  10 min face to face time

## 2017-09-20 NOTE — LETTER
2017       RE: Mary Kay Deal  [address]  [place] [address]     Dear Colleague,    Thank you for referring your patient, Mary Kay Deal, to the RADIATION ONCOLOGY CLINIC. Please see a copy of my visit note below.    RADIATION ONCOLOGY FOLLOW-UP  2017  NAME: Mary Kay Deal  MRN: [id number]  : 1960    DISEASE TREATED: Presumed NSCLC of the left upper lobe, [id number]    INTERVAL SINCE COMPLETION OF RADIOTHERAPY: ~4 years (Completed 2013)    TYPE OF RADIOTHERAPY GIVEN: SBRT 5000cGy in 5 fractions, 6MV, 80%IDL    SUBJECTIVE:   Mrs. Deal is a 57 year old woman with history of recurrent fungal infection and non-biopsy proven stage IA lung cancer of the RUBEN, s/p SBRT. She returns for routine follow-up.  The patient had interval CT scans the chest completed on 2017. This is been read as a stable exam without convincing recurrent or residual malignancy in the chest. Personal review of the imaging suggests a continued complete response, with slight interval decrease in the size of the patient's RUBEN lesion.  On exam today, Ms. Deal is overall doing well. She denies any new shortness of breath; she remains on 3L/min of oxygen via concentrator at all times, up to 3-4L with exertion and at night. Her energy level and cough are at baseline, although she states that she has had a bit of an increase in expectorant. She recently discontinue montelukast attempts to decrease her mucous production. She otherwise denies fevers, chills, nausea, vomiting, diarrhea. A complete review of systems was otherwise unremarkable.    OBJECTIVE:  /84  Pulse 88  LMP 2004  Gen: No acute distress. Alert, oriented.   Neck: No palpable LNs in the neck or supraclavicular areas.   CV: Regular rate and rhythm. No murmur.   Lungs: Slight end-expiratory wheeze. Otherwise CTAB    IMAGING:   CT scan 2017:  IMPRESSION: Stable exam without convincing recurrent or residual  malignancy in the  chest.    IMPRESSION: Radiographic complete response to SBRT.     RECOMMENDATIONS: We encouraged the patient to contact her former EN provider to discuss possible workup of her increasing expectoration. RTC in 6 months with repeat CT of the chest without contrast.     Patient seen and discussed with staff, Dr. Nevarez.    Yann Vallejo MD-PhD PGY-2  Radiation Oncology Resident, [facility]    I saw the patient with the resident.  I agree with the resident's note and plan of care.      RENÉ Nevarez M.D.  Department of Radiation Oncology  [facility]    FOLLOW-UP VISIT    Patient Name: Mary Kay Deal      : 1960     Age: 57 year old        ______________________________________________________________________________     Chief Complaint   Patient presents with     Cancer     4 year fup for SBRT      /84  Pulse 88  LMP 2004     Date Radiation Completed: 2913    Pain  Denies    Labs  Other Labs: No    Imaging  CT: chest            Other Appointments:     MD Name:  Appointment Date:    MD Name: Appointment Date:   MD Name: Appointment Date:   Other Appointment Notes:     Residual Radiation side effect: none     Additional Instructions:     Nurse face-to-face time: Level 3:  10 min face to face time          Again, thank you for allowing me to participate in the care of your patient.      Sincerely,    Merrill Nevarez MD

## 2017-09-20 NOTE — MR AVS SNAPSHOT
After Visit Summary   9/20/2017    Mary Kay Deal    MRN: [id number]           Patient Information     Date Of Birth          1960        Visit Information        Provider Department      9/20/2017 11:30 AM Merrill Nevarez MD [facility]        Today's Diagnoses     Malignant neoplasm of upper lobe of left lung (H)    -  1       Follow-ups after your visit        Follow-up notes from your care team     Return in about 6 months (around 3/20/2018).      Your next 10 appointments already scheduled     Mar 19, 2018 10:30 AM CDT   CT CHEST W/O CONTRAST with SHCT1   [facility] ([facility])    [address] [place] [address] [phone]           Please bring any scans or X-rays taken at other hospitals, if similar tests were done. Also bring a list of your medicines, including vitamins, minerals and over-the-counter drugs. It is safest to leave personal items at home.  Be sure to tell your doctor:   If you have any allergies.   If there s any chance you are pregnant.   If you are breastfeeding.   If you have any special needs.  You do not need to do anything special to prepare.  Please wear loose clothing, such as a sweat suit or jogging clothes. Avoid snaps, zippers and other metal. We may ask you to undress and put on a hospital gown.            Mar 20, 2018 10:30 AM CDT   Return Visit with Merrill Nevarez MD   [facility] ([facility])    [facility]  1st Floor  [address] [facility] [id number] [phone]              Future tests that were ordered for you today     Open Future Orders        Priority Expected Expires Ordered    CT Chest w/o contrast Routine 3/20/2018 9/20/2018 9/20/2017            Who to contact     Please call your clinic at [phone] to:    Ask questions about your health    Make or cancel appointments    Discuss your medicines    Learn about your  test results    Speak to your doctor   If you have compliments or concerns about an experience at your clinic, or if you wish to file a complaint, please contact [facility] Patient Relations at [phone] or email us at [email or web address]         Additional Information About Your Visit        bContexthart Information     CUPRt gives you secure access to your electronic health record. If you see a primary care provider, you can also send messages to your care team and make appointments. If you have questions, please call your primary care clinic.  If you do not have a primary care provider, please call [phone] and they will assist you.      CEDU is an electronic gateway that provides easy, online access to your medical records. With CEDU, you can request a clinic appointment, read your test results, renew a prescription or communicate with your care team.     To access your existing account, please contact your [facility] Clinic or call [phone] for assistance.        Care EveryWhere ID     This is your Care EveryWhere ID. This could be used by other organizations to access your [place] medical records  [id number]        Your Vitals Were     Pulse Last Period                88 09/08/2004           Blood Pressure from Last 3 Encounters:   09/20/17 128/84   08/11/17 132/89   05/15/17 137/82    Weight from Last 3 Encounters:   08/11/17 85.7 kg (189 lb)   05/15/17 87.1 kg (192 lb)   02/10/17 87.1 kg (192 lb)               Primary Care Provider Office Phone # Fax #    Doug Godoy [phone] [address]  [facility] [address]        Equal Access to Services     NIDHI PARKINSON : Hadii lashae Rosa, rene marley, jarvis gray. So [facility] [phone].    ATENCIÓN: Si habla español, tiene a rene disposición servicios gratuitos de asistencia  lingüísticaYuniel Taylor al [phone].    We comply with applicable federal civil rights laws and [place] laws. We do not discriminate on the basis of race, color, national origin, age, disability sex, sexual orientation or gender identity.            Thank you!     Thank you for choosing [facility]  for your care. Our goal is always to provide you with excellent care. Hearing back from our patients is one way we can continue to improve our services. Please take a few minutes to complete the written survey that you may receive in the mail after your visit with us. Thank you!             Your Updated Medication List - Protect others around you: Learn how to safely use, store and throw away your medicines at www.disposemymeds.org.          This list is accurate as of: 9/20/17 12:08 PM.  Always use your most recent med list.                   Brand Name Dispense Instructions for use Diagnosis    arformoterol 15 MCG/2ML Nebu neb solution    BROVANA    120 mL    Take 2 mLs (15 mcg) by nebulization 2 times daily    Centrilobular emphysema (H)       azithromycin 250 MG tablet    ZITHROMAX    30 tablet    TAKE ONE TABLET BY MOUTH EVERY DAY AS ANTIINFAMMATORY FOR COPD    COPD (chronic obstructive pulmonary disease) (H)       budesonide 1 MG/2ML Susp neb solution    PULMICORT     Take 2 mLs (1 mg) by nebulization 2 times daily    Centrilobular emphysema (H)       calcium carbonate 600 MG tablet   Generic drug:  calcium      Take 1 tablet by mouth 2 times daily. 1-2 tablets as tolerated        Chlorpheniramine-DM 4-20 MG Tabs     84 tablet    Take 1 tablet by mouth every 6 hours as needed.    COPD (chronic obstructive pulmonary disease) (H)       doxycycline 100 MG capsule    VIBRAMYCIN    20 capsule    Take 1 capsule (100 mg) by mouth 2 times daily    Emphysema of lung (H)       Fingertip Pulse Oximeter Misc     1 each    To be used as needed - for SOB.    COPD (chronic obstructive pulmonary disease) (H),  Respiratory failure with hypoxia (H)       fluticasone 50 MCG/ACT spray    FLONASE    16 g    Spray 1 spray into both nostrils 2 times daily    COPD (chronic obstructive pulmonary disease) (H)       LORazepam 0.5 MG tablet    ATIVAN    30 tablet    Take 1 tablet (0.5 mg) by mouth every 8 hours as needed for anxiety    Anxiety       montelukast 10 MG tablet    SINGULAIR    30 tablet    Take 1 tablet (10 mg) by mouth every evening    Centrilobular emphysema (H)       MULTIVITAMIN TABS   OR      1 TABLET BY MOUTH DAILY        Nebulizer Compressor Kit     1 kit    1 Device 4 times daily    COPD (chronic obstructive pulmonary disease) (H)       omeprazole 20 MG CR capsule    priLOSEC    180 capsule    TAKE ONE CAPSULE BY MOUTH TWICE A DAY    Gastroesophageal reflux disease with esophagitis       order for DME      O2 at night and prn        order for DME     1 Device    Equipment being ordered: Oxygen --Please provide oxygen at 4 LPM via nasal canula with exertion and with sleep. Can be on room air at rest. Please provide portability. Keep oxygen saturations above 90%.    COPD (chronic obstructive pulmonary disease) (H)       predniSONE 10 MG tablet    DELTASONE    23 tablet    Take 40 mg x 2 days, 30 mg x 2 days, 20 mg x 3 days and 10 mg x 3 days    Chronic obstructive pulmonary disease, unspecified COPD type (H)       SENNA LAXATIVE PO      2 TABLET BY MOUTH DAILY AS NEEDED FOR CONSTIPATION        sertraline 50 MG tablet    ZOLOFT    135 tablet    TAKE ONE AND ONE-HALF TABLET BY MOUTH EVERY DAY    Major depressive disorder, single episode, mild (H)       simvastatin 20 MG tablet    ZOCOR    90 tablet    Take 1 tablet (20 mg) by mouth At Bedtime    Hyperlipidemia LDL goal <130       tiotropium 18 MCG capsule    SPIRIVA HANDIHALER    90 capsule    Inhale 1 capsule (18 mcg) into the lungs daily    COPD (chronic obstructive pulmonary disease) (H)       VENTOLIN  (90 BASE) MCG/ACT Inhaler   Generic drug:  albuterol      54 g    INHALE 2 PUFFS BY MOUTH INTO THE LUNGS EVERY 4 HOURS AS NEEDED    COPD (chronic obstructive pulmonary disease) (H), Oral thrush       vitamin D 1000 UNITS capsule      Take 1 capsule by mouth 2 times daily.

## 2017-10-18 ENCOUNTER — OFFICE VISIT (OUTPATIENT)
Dept: FAMILY MEDICINE | Facility: CLINIC | Age: 57
End: 2017-10-18
Payer: MEDICARE

## 2017-10-18 VITALS
WEIGHT: 190 LBS | OXYGEN SATURATION: 94 % | TEMPERATURE: 98.2 F | HEART RATE: 97 BPM | SYSTOLIC BLOOD PRESSURE: 151 MMHG | RESPIRATION RATE: 18 BRPM | HEIGHT: 65 IN | BODY MASS INDEX: 31.65 KG/M2 | DIASTOLIC BLOOD PRESSURE: 97 MMHG

## 2017-10-18 DIAGNOSIS — Z23 NEED FOR PROPHYLACTIC VACCINATION AND INOCULATION AGAINST INFLUENZA: ICD-10-CM

## 2017-10-18 DIAGNOSIS — H57.89 IRRITATION OF EYE: Primary | ICD-10-CM

## 2017-10-18 DIAGNOSIS — J44.9 COPD (CHRONIC OBSTRUCTIVE PULMONARY DISEASE) (H): ICD-10-CM

## 2017-10-18 PROCEDURE — 90686 IIV4 VACC NO PRSV 0.5 ML IM: CPT | Performed by: NURSE PRACTITIONER

## 2017-10-18 PROCEDURE — 99213 OFFICE O/P EST LOW 20 MIN: CPT | Mod: 25 | Performed by: NURSE PRACTITIONER

## 2017-10-18 PROCEDURE — G0008 ADMIN INFLUENZA VIRUS VAC: HCPCS | Performed by: NURSE PRACTITIONER

## 2017-10-18 RX ORDER — TIOTROPIUM BROMIDE 18 UG/1
CAPSULE ORAL; RESPIRATORY (INHALATION)
Qty: 90 CAPSULE | Refills: 11 | Status: SHIPPED | OUTPATIENT
Start: 2017-10-18 | End: 2018-03-26

## 2017-10-18 NOTE — MR AVS SNAPSHOT
After Visit Summary   10/18/2017    Mary Kay Deal    MRN: [id number]           Patient Information     Date Of Birth          1960        Visit Information        Provider Department      10/18/2017 10:30 AM Erik Olvera APRN [facility]        Today's Diagnoses     Need for prophylactic vaccination and inoculation against influenza    -  1    Irritation of eye           Follow-ups after your visit        Additional Services     OPHTHALMOLOGY ADULT REFERRAL       Your provider has referred you to:   N: [facility], LORELEI Brooks [phone] [email or web address] [email or web address]  N: [facility] JANIYAAYuniel - [facility] [phone]   [email or web address]  Mattie [phone]   [email or web address]  Opthalmology LORELEI Brooks [phone]   [email or web address]  [facility] Mychal Brooks [phone]   [email or web address]      Please be aware that coverage of these services is subject to the terms and limitations of your health insurance plan.  Call member services at your health plan with any benefit or coverage questions.      Please bring the following with you to your appointment:    (1) Any X-Rays, CTs or MRIs which have been performed.  Contact the facility where they were done to arrange for  prior to your scheduled appointment.    (2) List of current medications  (3) This referral request   (4) Any documents/labs given to you for this referral                  Your next 10 appointments already scheduled     Mar 19, 2018 10:30 AM CDT   CT CHEST W/O CONTRAST with SHCT1   [facility] ([facility])    [address] [facility] [address] [phone]           Please bring any scans or X-rays taken at other hospitals, if similar tests were done. Also bring a list of  your medicines, including vitamins, minerals and over-the-counter drugs. It is safest to leave personal items at home.  Be sure to tell your doctor:   If you have any allergies.   If there s any chance you are pregnant.   If you are breastfeeding.   If you have any special needs.  You do not need to do anything special to prepare.  Please wear loose clothing, such as a sweat suit or jogging clothes. Avoid snaps, zippers and other metal. We may ask you to undress and put on a hospital gown.            Mar 20, 2018 10:00 AM CDT   PFT VISIT with ESTER PFL D   [facility] Pulmonary Function Testing ([facility])    [address]  [address] [facility] [id number] [phone]            Mar 20, 2018 10:30 AM CDT   Return Visit with Merrill Nevarez MD   [facility] ([facility])    [facility]  1st Floor  [address] [facility] [address] [phone]            Mar 20, 2018 11:10 AM CDT   (Arrive by 10:55 AM)   Return Visit with Keith Mason MD   [facility] for [facility] ([facility])    [address] [id number] [phone]              Who to contact     If you have questions or need follow up information about today's clinic visit or your schedule please contact [facility] directly at [phone].  Normal or non-critical lab and imaging results will be communicated to you by MyChart, letter or phone within 4 business days after the clinic has received the results. If you do not hear from us within 7 days, please contact the clinic through MyChart or phone. If you have a critical or abnormal lab result, we will notify you by phone as soon as possible.  Submit refill requests through [employer] or call your pharmacy and they will forward the refill request to us. Please allow 3 business days for your refill to be completed.           "Additional Information About Your Visit        [email or web address].tvhart Information     [employer] gives you secure access to your electronic health record. If you see a primary care provider, you can also send messages to your care team and make appointments. If you have questions, please call your primary care clinic.  If you do not have a primary care provider, please call [phone] and they will assist you.        Care EveryWhere ID     This is your Care EveryWhere ID. This could be used by other organizations to access your [place] medical records  [id number]        Your Vitals Were     Pulse Temperature Respirations Height Last Period Pulse Oximetry    97 98.2  F (36.8  C) (Tympanic) 18 5' 5\" (1.651 m) 09/08/2004 94%    BMI (Body Mass Index)                   31.62 kg/m2            Blood Pressure from Last 3 Encounters:   10/18/17 (!) 151/97   09/20/17 128/84   08/11/17 132/89    Weight from Last 3 Encounters:   10/18/17 190 lb (86.2 kg)   08/11/17 189 lb (85.7 kg)   05/15/17 192 lb (87.1 kg)              We Performed the Following     ADMIN INFLUENZA (For MEDICARE Patients ONLY) []     FLU VAC, SPLIT VIRUS IM > 3 YO (QUADRIVALENT) [09237]     OPHTHALMOLOGY ADULT REFERRAL          Today's Medication Changes          These changes are accurate as of: 10/18/17 10:58 AM.  If you have any questions, ask your nurse or doctor.               These medicines have changed or have updated prescriptions.        Dose/Directions    * tiotropium 18 MCG capsule   Commonly known as:  SPIRIVA HANDIHALER   This may have changed:  Another medication with the same name was added. Make sure you understand how and when to take each.   Used for:  COPD (chronic obstructive pulmonary disease) (H)   Changed by:  Keith Mason MD        Dose:  1 capsule   Inhale 1 capsule (18 mcg) into the lungs daily   Quantity:  90 capsule   Refills:  11       * SPIRIVA HANDIHALER 18 MCG capsule   This may have changed:  You were already taking a " medication with the same name, and this prescription was added. Make sure you understand how and when to take each.   Used for:  COPD (chronic obstructive pulmonary disease) (H)   Generic drug:  tiotropium   Changed by:  Keith Mason MD        INHALE ONE CAPSULE INTO THE LUNGS EVERY DAY   Quantity:  90 capsule   Refills:  11       * Notice:  This list has 2 medication(s) that are the same as other medications prescribed for you. Read the directions carefully, and ask your doctor or other care provider to review them with you.         Where to get your medicines      These medications were sent to [facility] [place] - [address], [address]  [address], [address], [facility] [address]     Phone:  [phone]     SPIRIVA HANDIHALER 18 MCG capsule                Primary Care Provider Office Phone # Fax #    Duog Godoy [phone] [address]  [facility] [address]        Equal Access to Services     [facility] HospitalVICKEY : Hadii aad ku hadasho Soomaali, waaxda luqadaha, qaybta kaalmada adeegyada, waxay idiin haydayanaran sarah merino . So [facility] [phone].    ATENCIÓN: Si habla español, tiene a rene disposición servicios gratuitos de asistencia lingüística. Llame al [phone].    We comply with applicable federal civil rights laws and [place] laws. We do not discriminate on the basis of race, color, national origin, age, disability, sex, sexual orientation, or gender identity.            Thank you!     Thank you for choosing [facility]  for your care. Our goal is always to provide you with excellent care. Hearing back from our patients is one way we can continue to improve our services. Please take a few minutes to complete the written survey that you may receive in the mail after your visit with us. Thank you!             Your Updated Medication List - Protect others around you: Learn how to safely use, store and throw  away your medicines at www.disposemymeds.org.          This list is accurate as of: 10/18/17 10:58 AM.  Always use your most recent med list.                   Brand Name Dispense Instructions for use Diagnosis    arformoterol 15 MCG/2ML Nebu neb solution    BROVANA    120 mL    Take 2 mLs (15 mcg) by nebulization 2 times daily    Centrilobular emphysema (H)       azithromycin 250 MG tablet    ZITHROMAX    30 tablet    TAKE ONE TABLET BY MOUTH EVERY DAY AS ANTIINFAMMATORY FOR COPD    COPD (chronic obstructive pulmonary disease) (H)       budesonide 1 MG/2ML Susp neb solution    PULMICORT     Take 2 mLs (1 mg) by nebulization 2 times daily    Centrilobular emphysema (H)       calcium carbonate 600 MG tablet   Generic drug:  calcium      Take 1 tablet by mouth 2 times daily. 1-2 tablets as tolerated        Chlorpheniramine-DM 4-20 MG Tabs     84 tablet    Take 1 tablet by mouth every 6 hours as needed.    COPD (chronic obstructive pulmonary disease) (H)       doxycycline 100 MG capsule    VIBRAMYCIN    20 capsule    Take 1 capsule (100 mg) by mouth 2 times daily    Emphysema of lung (H)       Fingertip Pulse Oximeter Misc     1 each    To be used as needed - for SOB.    COPD (chronic obstructive pulmonary disease) (H), Respiratory failure with hypoxia (H)       fluticasone 50 MCG/ACT spray    FLONASE    16 g    Spray 1 spray into both nostrils 2 times daily    COPD (chronic obstructive pulmonary disease) (H)       LORazepam 0.5 MG tablet    ATIVAN    30 tablet    Take 1 tablet (0.5 mg) by mouth every 8 hours as needed for anxiety    Anxiety       montelukast 10 MG tablet    SINGULAIR    30 tablet    Take 1 tablet (10 mg) by mouth every evening    Centrilobular emphysema (H)       MULTIVITAMIN TABS   OR      1 TABLET BY MOUTH DAILY        Nebulizer Compressor Kit     1 kit    1 Device 4 times daily    COPD (chronic obstructive pulmonary disease) (H)       omeprazole 20 MG CR capsule    priLOSEC    180 capsule    TAKE  ONE CAPSULE BY MOUTH TWICE A DAY    Gastroesophageal reflux disease with esophagitis       order for DME      O2 at night and prn        order for DME     1 Device    Equipment being ordered: Oxygen --Please provide oxygen at 4 LPM via nasal canula with exertion and with sleep. Can be on room air at rest. Please provide portability. Keep oxygen saturations above 90%.    COPD (chronic obstructive pulmonary disease) (H)       predniSONE 10 MG tablet    DELTASONE    23 tablet    Take 40 mg x 2 days, 30 mg x 2 days, 20 mg x 3 days and 10 mg x 3 days    Chronic obstructive pulmonary disease, unspecified COPD type (H)       SENNA LAXATIVE PO      2 TABLET BY MOUTH DAILY AS NEEDED FOR CONSTIPATION        sertraline 50 MG tablet    ZOLOFT    135 tablet    TAKE ONE AND ONE-HALF TABLET BY MOUTH EVERY DAY    Major depressive disorder, single episode, mild (H)       simvastatin 20 MG tablet    ZOCOR    90 tablet    Take 1 tablet (20 mg) by mouth At Bedtime    Hyperlipidemia LDL goal <130       * tiotropium 18 MCG capsule    SPIRIVA HANDIHALER    90 capsule    Inhale 1 capsule (18 mcg) into the lungs daily    COPD (chronic obstructive pulmonary disease) (H)       * SPIRIVA HANDIHALER 18 MCG capsule   Generic drug:  tiotropium     90 capsule    INHALE ONE CAPSULE INTO THE LUNGS EVERY DAY    COPD (chronic obstructive pulmonary disease) (H)       VENTOLIN  (90 BASE) MCG/ACT Inhaler   Generic drug:  albuterol     54 g    INHALE 2 PUFFS BY MOUTH INTO THE LUNGS EVERY 4 HOURS AS NEEDED    COPD (chronic obstructive pulmonary disease) (H), Oral thrush       vitamin D 1000 UNITS capsule      Take 1 capsule by mouth 2 times daily.        * Notice:  This list has 2 medication(s) that are the same as other medications prescribed for you. Read the directions carefully, and ask your doctor or other care provider to review them with you.

## 2017-10-18 NOTE — PROGRESS NOTES
"HPI      SUBJECTIVE:   Mary Kay Deal is a 57 year old female who presents to clinic today for the following health issues:      Eye(s) Problem      Duration: intermittent few months    Description:  Location: right  Pain: YES  Redness: no  Discharge: no    Accompanying signs and symptoms: Right face pain; Sharp pain in right jaw/neck    History (Trauma, foreign body exposure,): \"Feels like something is stuck in eye\"    Precipitating or alleviating factors (contact use): None    Therapies tried and outcome: eye drops     Right side of head has chronic issues  Right eye feels dry and that there is something in there on and off for several months. Vision is getting worse  Using eye drops but nothing works   Eyes do feel a little better in the morning   Uses cheaters. Tried a prescription bifocals but didn't like it   Has a feeling of something in the throat for many years      Past Medical History:   Diagnosis Date     Aspergillosis, with pneumonia (H)      Cancer (H)     questionable lung CA     Chronic infection     Known fungal lung condition     Constipation      Disorder of bone and cartilage, unspecified      Emphysema      GERD      Hx of radiation therapy     For lung mass     Mild intermittent asthma      Other dyspnea and respiratory abnormality      Other malaise and fatigue      SMOKER      Past Surgical History:   Procedure Laterality Date     BREAST SURGERY      Tumor removal at the age of 15     COLONOSCOPY N/A 2014    Procedure: COLONOSCOPY;  Surgeon: Patricia Perry MD;  Location:  OR     COLONOSCOPY N/A 2015    Procedure: COMBINED COLONOSCOPY, SINGLE OR MULTIPLE BIOPSY/POLYPECTOMY BY BIOPSY;  Surgeon: Patricia Perry MD;  Location:  GI     ENT SURGERY      Tonsillectomy     GYN SURGERY       x 1     SURGICAL HISTORY OF -       s/p Breast tumor @ age 15 - benign     SURGICAL HISTORY OF -       c section     SURGICAL HISTORY OF -       tonsillectomy     Social History "   Substance Use Topics     Smoking status: Former Smoker     Packs/day: 1.00     Years: 30.00     Types: Cigarettes     Quit date: 10/21/2005     Smokeless tobacco: Never Used     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     Current Outpatient Prescriptions   Medication Sig Dispense Refill     SPIRIVA HANDIHALER 18 MCG capsule INHALE ONE CAPSULE INTO THE LUNGS EVERY DAY 90 capsule 11     montelukast (SINGULAIR) 10 MG tablet Take 1 tablet (10 mg) by mouth every evening 30 tablet 11     simvastatin (ZOCOR) 20 MG tablet Take 1 tablet (20 mg) by mouth At Bedtime 90 tablet 0     sertraline (ZOLOFT) 50 MG tablet TAKE ONE AND ONE-HALF TABLET BY MOUTH EVERY  tablet 0     arformoterol (BROVANA) 15 MCG/2ML NEBU neb solution Take 2 mLs (15 mcg) by nebulization 2 times daily 120 mL 11     budesonide (PULMICORT) 1 MG/2ML SUSP neb solution Take 2 mLs (1 mg) by nebulization 2 times daily       predniSONE (DELTASONE) 10 MG tablet Take 40 mg x 2 days, 30 mg x 2 days, 20 mg x 3 days and 10 mg x 3 days 23 tablet 1     fluticasone (FLONASE) 50 MCG/ACT spray Spray 1 spray into both nostrils 2 times daily 16 g 7     VENTOLIN  (90 BASE) MCG/ACT Inhaler INHALE 2 PUFFS BY MOUTH INTO THE LUNGS EVERY 4 HOURS AS NEEDED 54 g 9     LORazepam (ATIVAN) 0.5 MG tablet Take 1 tablet (0.5 mg) by mouth every 8 hours as needed for anxiety 30 tablet 3     azithromycin (ZITHROMAX) 250 MG tablet TAKE ONE TABLET BY MOUTH EVERY DAY AS ANTIINFAMMATORY FOR COPD 30 tablet 10     doxycycline (VIBRAMYCIN) 100 MG capsule Take 1 capsule (100 mg) by mouth 2 times daily 20 capsule 1     tiotropium (SPIRIVA HANDIHALER) 18 MCG inhalation capsule Inhale 1 capsule (18 mcg) into the lungs daily 90 capsule 11     omeprazole (PRILOSEC) 20 MG capsule TAKE ONE CAPSULE BY MOUTH TWICE A  capsule 3     order for DME Equipment being ordered: Oxygen --Please provide oxygen at 4 LPM via nasal canula with exertion and with sleep. Can be on room air at rest.  "Please provide portability. Keep oxygen saturations above 90%. 1 Device 1     Respiratory Therapy Supplies (NEBULIZER COMPRESSOR) KIT 1 Device 4 times daily 1 kit 1     Misc. Devices (FINGERTIP PULSE OXIMETER) MISC To be used as needed - for SOB. 1 each 0     Chlorpheniramine-DM 4-20 MG TABS Take 1 tablet by mouth every 6 hours as needed. 84 tablet 3     Cholecalciferol (VITAMIN D) 1000 UNIT capsule Take 1 capsule by mouth 2 times daily.       Calcium (CALCIUM 600) 600 MG tablet Take 1 tablet by mouth 2 times daily. 1-2 tablets as tolerated       ORDER FOR DME O2 at night and prn       MULTIVITAMIN TABS   OR 1 TABLET BY MOUTH DAILY       SENNA LAXATIVE OR 2 TABLET BY MOUTH DAILY AS NEEDED FOR CONSTIPATION       Allergies   Allergen Reactions     Penicillins Hives       Reviewed and updated as needed this visit by clinical staff and provider      ROS  Detailed as above       BP (!) 151/97 (BP Location: Right arm, Patient Position: Chair, Cuff Size: Adult Regular)  Pulse 97  Temp 98.2  F (36.8  C) (Tympanic)  Resp 18  Ht 5' 5\" (1.651 m)  Wt 190 lb (86.2 kg)  LMP 09/08/2004  SpO2 94%  BMI 31.62 kg/m2      Physical Exam   Constitutional: She is well-developed, well-nourished, and in no distress.   Eyes: Conjunctivae and EOM are normal.   Eyes appear dry. No obvious foreign body    Pulmonary/Chest: Effort normal.   Neurological: She is alert.   Psychiatric: Mood and affect normal.   Vitals reviewed.        Assessment and Plan:       ICD-10-CM    1. Irritation of eye H57.8 OPHTHALMOLOGY ADULT REFERRAL   2. Need for prophylactic vaccination and inoculation against influenza Z23 FLU VAC, SPLIT VIRUS IM > 3 YO (QUADRIVALENT) [26830]     ADMIN INFLUENZA (For MEDICARE Patients ONLY) []       No obvious findings today. Suspect dry eyes is playing a role. She should f/u with ophthalmology asap   Call or rtc prn       MOE Gonzalez, CNP  [facility]      Injectable Influenza Immunization " Documentation    1.  Is the person to be vaccinated sick today?   No    2. Does the person to be vaccinated have an allergy to a component   of the vaccine?   No    3. Has the person to be vaccinated ever had a serious reaction   to influenza vaccine in the past?   No    4. Has the person to be vaccinated ever had Guillain-Barré syndrome?   No    Form completed by Patient

## 2017-10-18 NOTE — NURSING NOTE
"Chief Complaint   Patient presents with     Conjunctivitis       Initial BP (!) 151/97 (BP Location: Right arm, Patient Position: Chair, Cuff Size: Adult Regular)  Pulse 97  Temp 98.2  F (36.8  C) (Tympanic)  Resp 18  Ht 5' 5\" (1.651 m)  Wt 190 lb (86.2 kg)  LMP 09/08/2004  SpO2 94%  BMI 31.62 kg/m2 Estimated body mass index is 31.62 kg/(m^2) as calculated from the following:    Height as of this encounter: 5' 5\" (1.651 m).    Weight as of this encounter: 190 lb (86.2 kg).  Medication Reconciliation: complete   Tata Butler CMA (AAMA)      "

## 2017-10-19 DIAGNOSIS — E78.5 HYPERLIPIDEMIA LDL GOAL <130: ICD-10-CM

## 2017-10-23 RX ORDER — SIMVASTATIN 20 MG
20 TABLET ORAL AT BEDTIME
Qty: 30 TABLET | Refills: 0 | Status: SHIPPED | OUTPATIENT
Start: 2017-10-23 | End: 2018-01-29

## 2017-10-23 NOTE — TELEPHONE ENCOUNTER
Medication is being filled for 1 time refill only due to:  Patient needs to be seen because it has been more than one year since last visit.   Over due for labs also.  Marry Beard RN

## 2017-11-11 ENCOUNTER — TELEPHONE (OUTPATIENT)
Dept: FAMILY MEDICINE | Facility: CLINIC | Age: 57
End: 2017-11-11

## 2017-11-11 NOTE — TELEPHONE ENCOUNTER
"11/11/2017      Patient Communication Preferences indicate  Do not contact  and/or communication by \"Phone\" is not preferred. Call not required per Outreach team.      Outreach ,  Tami Berg        "

## 2017-11-16 DIAGNOSIS — F41.9 ANXIETY: ICD-10-CM

## 2017-11-16 NOTE — TELEPHONE ENCOUNTER
Controlled Substance Refill Request for Ativan 0.5 MG  Problem List Complete:  No     PROVIDER TO CONSIDER COMPLETION OF PROBLEM LIST AND OVERVIEW/CONTROLLED SUBSTANCE AGREEMENT    Last Written Prescription Date:  3-31-17  Last Fill Quantity: 30,   # refills: 3    Last Office Visit with [facility] primary care provider: 5-15-17    Future Office visit:     Controlled substance agreement on file: No.     Processing:  Staff will hand deliver Rx to on-site pharmacy     checked in past 6 months?  No, route to RN     Thank You!  Naomi Contreras  [facility]

## 2017-11-17 RX ORDER — LORAZEPAM 0.5 MG/1
0.5 TABLET ORAL EVERY 8 HOURS PRN
Qty: 90 TABLET | Refills: 3 | Status: SHIPPED | OUTPATIENT
Start: 2017-11-17 | End: 2018-10-01

## 2017-12-13 DIAGNOSIS — K21.00 GASTROESOPHAGEAL REFLUX DISEASE WITH ESOPHAGITIS: ICD-10-CM

## 2017-12-15 NOTE — TELEPHONE ENCOUNTER
Routing refill request to provider for review/approval because:  Failed protocol d/t dx of osteoporosis on file.     Please review and authorize if appropriate,     Thank you,   Farrah BELLA RN

## 2018-01-17 ENCOUNTER — TELEPHONE (OUTPATIENT)
Dept: FAMILY MEDICINE | Facility: CLINIC | Age: 58
End: 2018-01-17

## 2018-01-17 DIAGNOSIS — F32.0 MAJOR DEPRESSIVE DISORDER, SINGLE EPISODE, MILD (H): ICD-10-CM

## 2018-01-18 NOTE — TELEPHONE ENCOUNTER
"Requested Prescriptions   Pending Prescriptions Disp Refills     sertraline (ZOLOFT) 50 MG tablet  Last Written Prescription Date:  9/7/17  Last Fill Quantity: 135 tablet,  # refills: 0   Last Office Visit with [facility] or [facility] prescribing provider:  5/15/17 Jayne   Future Office Visit:    135 tablet 0     Sig: TAKE ONE AND ONE-HALF TABLET BY MOUTH EVERY DAY    SSRIs Protocol Failed    1/17/2018  6:26 PM       Failed - PHQ-9 score less than 5 in past 6 months    The PHQ-9 criteria is meant to fail. It requires a PHQ-9 score review  PHQ-9 SCORE 6/17/2015 7/1/2016 5/15/2017   Total Score 9 - -   Total Score - 9 8          Passed - Patient is age 18 or older       Passed - No active pregnancy on record       Passed - No positive pregnancy test in last 12 months       Passed - Recent (6 mo) or future visit with authorizing provider's specialty    Patient had office visit in the last 6 months or has a visit in the next 30 days with authorizing provider.  See \"Patient Info\" tab in inbasket, or \"Choose Columns\" in Meds & Orders section of the refill encounter.              "

## 2018-01-29 ENCOUNTER — OFFICE VISIT (OUTPATIENT)
Dept: FAMILY MEDICINE | Facility: CLINIC | Age: 58
End: 2018-01-29
Payer: MEDICARE

## 2018-01-29 ENCOUNTER — HOSPITAL ENCOUNTER (OUTPATIENT)
Dept: MAMMOGRAPHY | Facility: CLINIC | Age: 58
Discharge: HOME | End: 2018-01-29
Attending: INTERNAL MEDICINE | Admitting: INTERNAL MEDICINE
Payer: MEDICARE

## 2018-01-29 VITALS
HEIGHT: 65 IN | OXYGEN SATURATION: 99 % | BODY MASS INDEX: 31.65 KG/M2 | WEIGHT: 190 LBS | DIASTOLIC BLOOD PRESSURE: 76 MMHG | HEART RATE: 89 BPM | TEMPERATURE: 97.7 F | SYSTOLIC BLOOD PRESSURE: 141 MMHG

## 2018-01-29 DIAGNOSIS — Z11.59 NEED FOR HEPATITIS C SCREENING TEST: ICD-10-CM

## 2018-01-29 DIAGNOSIS — E78.5 HYPERLIPIDEMIA LDL GOAL <130: ICD-10-CM

## 2018-01-29 DIAGNOSIS — K59.00 CONSTIPATION, UNSPECIFIED CONSTIPATION TYPE: ICD-10-CM

## 2018-01-29 DIAGNOSIS — J44.9 CHRONIC OBSTRUCTIVE PULMONARY DISEASE, UNSPECIFIED COPD TYPE (H): Primary | ICD-10-CM

## 2018-01-29 DIAGNOSIS — Z12.31 VISIT FOR SCREENING MAMMOGRAM: ICD-10-CM

## 2018-01-29 DIAGNOSIS — M81.0 OSTEOPOROSIS, UNSPECIFIED OSTEOPOROSIS TYPE, UNSPECIFIED PATHOLOGICAL FRACTURE PRESENCE: ICD-10-CM

## 2018-01-29 DIAGNOSIS — R13.19 ESOPHAGEAL DYSPHAGIA: ICD-10-CM

## 2018-01-29 DIAGNOSIS — R30.0 DYSURIA: ICD-10-CM

## 2018-01-29 DIAGNOSIS — R91.8 PULMONARY NODULES: ICD-10-CM

## 2018-01-29 DIAGNOSIS — I27.20 MILD PULMONARY HYPERTENSION (H): ICD-10-CM

## 2018-01-29 LAB
ALBUMIN UR-MCNC: NEGATIVE MG/DL
APPEARANCE UR: CLEAR
BACTERIA #/AREA URNS HPF: ABNORMAL /HPF
BILIRUB UR QL STRIP: NEGATIVE
COLOR UR AUTO: YELLOW
ERYTHROCYTE [DISTWIDTH] IN BLOOD BY AUTOMATED COUNT: 13.7 % (ref 10–15)
GLUCOSE UR STRIP-MCNC: NEGATIVE MG/DL
HCT VFR BLD AUTO: 37.8 % (ref 35–47)
HCV AB SERPL QL IA: NONREACTIVE
HGB BLD-MCNC: 11.7 G/DL (ref 11.7–15.7)
HGB UR QL STRIP: NEGATIVE
KETONES UR STRIP-MCNC: NEGATIVE MG/DL
LEUKOCYTE ESTERASE UR QL STRIP: ABNORMAL
MCH RBC QN AUTO: 28.1 PG (ref 26.5–33)
MCHC RBC AUTO-ENTMCNC: 31 G/DL (ref 31.5–36.5)
MCV RBC AUTO: 91 FL (ref 78–100)
NITRATE UR QL: NEGATIVE
NON-SQ EPI CELLS #/AREA URNS LPF: ABNORMAL /LPF
PH UR STRIP: 7 PH (ref 5–7)
PLATELET # BLD AUTO: 312 10E9/L (ref 150–450)
RBC # BLD AUTO: 4.16 10E12/L (ref 3.8–5.2)
RBC #/AREA URNS AUTO: ABNORMAL /HPF
SOURCE: ABNORMAL
SP GR UR STRIP: 1.01 (ref 1–1.03)
UROBILINOGEN UR STRIP-ACNC: 0.2 EU/DL (ref 0.2–1)
WBC # BLD AUTO: 7.5 10E9/L (ref 4–11)
WBC #/AREA URNS AUTO: ABNORMAL /HPF

## 2018-01-29 PROCEDURE — 84443 ASSAY THYROID STIM HORMONE: CPT | Performed by: INTERNAL MEDICINE

## 2018-01-29 PROCEDURE — 80053 COMPREHEN METABOLIC PANEL: CPT | Performed by: INTERNAL MEDICINE

## 2018-01-29 PROCEDURE — 81001 URINALYSIS AUTO W/SCOPE: CPT | Performed by: INTERNAL MEDICINE

## 2018-01-29 PROCEDURE — 84439 ASSAY OF FREE THYROXINE: CPT | Performed by: INTERNAL MEDICINE

## 2018-01-29 PROCEDURE — 77067 SCR MAMMO BI INCL CAD: CPT

## 2018-01-29 PROCEDURE — 80061 LIPID PANEL: CPT | Performed by: INTERNAL MEDICINE

## 2018-01-29 PROCEDURE — 36415 COLL VENOUS BLD VENIPUNCTURE: CPT | Performed by: INTERNAL MEDICINE

## 2018-01-29 PROCEDURE — 87086 URINE CULTURE/COLONY COUNT: CPT | Performed by: INTERNAL MEDICINE

## 2018-01-29 PROCEDURE — 99214 OFFICE O/P EST MOD 30 MIN: CPT | Performed by: INTERNAL MEDICINE

## 2018-01-29 PROCEDURE — G0472 HEP C SCREEN HIGH RISK/OTHER: HCPCS | Performed by: INTERNAL MEDICINE

## 2018-01-29 PROCEDURE — 85027 COMPLETE CBC AUTOMATED: CPT | Performed by: INTERNAL MEDICINE

## 2018-01-29 RX ORDER — SIMVASTATIN 20 MG
20 TABLET ORAL AT BEDTIME
Qty: 90 TABLET | Refills: 3 | Status: SHIPPED | OUTPATIENT
Start: 2018-01-29 | End: 2019-03-05

## 2018-01-29 NOTE — PROGRESS NOTES
"[facility]  CLINIC PROGRESS NOTE    Subjective:  Dysphagia   Mary Kay Deal has had a couple episodes of dysphagia involving pills.  She notes that it felt that pill got stuck in her throat.  She notes that she may have had an upper endoscopy about 10 years ago in [place].  She is currently taking omeprazole.    Urinary frequency   She has noticed urinary frequency for the past two weeks.  No pain with urination.    Past medical history, medications, allergies, social history, family history reviewed and updated in [facility] as of 1/29/2018 .    ROS  CONSTITUTIONAL: no fatigue, no unexpected change in weight  SKIN: no worrisome rashes, no worrisome moles, no worrisome lesions  EYES: no acute vision problems or changes  ENT: no ear problems, no mouth problems, no throat problems  RESP: stable breathing - using 4 liters - Dr. Mason at [facility] - did take prednisone 20 mg daily x 5 days  CV: no chest pain, no palpitations, no new or worsening peripheral edema  GI: dysphagia as above, no constipation, no diarrhea  :  urinary frequency  MS: occasional hip pain and left side neck stiffness  PSYCHIATRIC: no changes in mood or affect      Objective:  Vitals  /76 (BP Location: Left arm, Patient Position: Chair, Cuff Size: Adult Large)  Pulse 89  Temp 97.7  F (36.5  C) (Tympanic)  Ht 5' 5\" (1.651 m)  Wt 190 lb (86.2 kg)  LMP 09/08/2004  SpO2 99%  BMI 31.62 kg/m2  GEN: Alert Oriented x3 NAD  HEENT: Atraumatic, normocephalic, neck supple, no thyromegaly, negative cervical adenopathy  TM: TM bilaterally pearly and grey with normal light reflex  CV: RRR no murmurs or rubs  PULM: CTA no wheezes or crackles - oxygen by nasal canula  ABD: Soft, nontender nondistended,   SKIN: No visible skin lesion or ulcerations  EXT: trace edema bilateral lower extremities  NEURO: Gait and station deferred = motorized scooter, No focal neurologic deficits  PSYCH: Mood good, affect mood congruent    No images are attached to the " encounter.    Results for orders placed or performed in visit on 01/29/18 (from the past 24 hour(s))   CBC with platelets   Result Value Ref Range    WBC 7.5 4.0 - 11.0 10e9/L    RBC Count 4.16 3.8 - 5.2 10e12/L    Hemoglobin 11.7 11.7 - 15.7 g/dL    Hematocrit 37.8 35.0 - 47.0 %    MCV 91 78 - 100 fl    MCH 28.1 26.5 - 33.0 pg    MCHC 31.0 (L) 31.5 - 36.5 g/dL    RDW 13.7 10.0 - 15.0 %    Platelet Count 312 150 - 450 10e9/L   UA reflex to Microscopic and Culture   Result Value Ref Range    Color Urine Yellow     Appearance Urine Clear     Glucose Urine Negative NEG^Negative mg/dL    Bilirubin Urine Negative NEG^Negative    Ketones Urine Negative NEG^Negative mg/dL    Specific Gravity Urine 1.015 1.003 - 1.035    Blood Urine Negative NEG^Negative    pH Urine 7.0 5.0 - 7.0 pH    Protein Albumin Urine Negative NEG^Negative mg/dL    Urobilinogen Urine 0.2 0.2 - 1.0 EU/dL    Nitrite Urine Negative NEG^Negative    Leukocyte Esterase Urine Moderate (A) NEG^Negative    Source Midstream Urine    Urine Microscopic   Result Value Ref Range    WBC Urine 10-25 (A) OTO2^O - 2 /HPF    RBC Urine O - 2 OTO2^O - 2 /HPF    Squamous Epithelial /LPF Urine Moderate (A) FEW^Few /LPF    Bacteria Urine Few (A) NEG^Negative /HPF       Assessment/Plan:  Patient Instructions   (J44.9) Chronic obstructive pulmonary disease, unspecified COPD type (H)  (primary encounter diagnosis)  Comment: We will continue current inhalers and oxygen by nasal canula at 4 liters.    Plan: TSH with free T4 reflex            (I27.20) Mild pulmonary hypertension  Comment: Also stable - repeat echocardiogram requested - [facility] - [phone]   Plan: CBC with platelets, TSH with free T4 reflex            (E78.5) Hyperlipidemia LDL goal <130  Comment: continue simvastatin and check fasting lipid panel today  Plan: COMPREHENSIVE METABOLIC PANEL, Lipid panel         reflex to direct LDL Fasting, simvastatin         (ZOCOR) 20 MG tablet, CBC with platelets             (R91.8) Pulmonary nodules  Comment: Follow up in pulmonary clinic   Plan:     (Z12.31) Visit for screening mammogram  Comment:  [facility] [place] (also performs diagnostic mammogram, ultrasound and biopsy) [phone].   Plan: MA SCREENING DIGITAL BILAT - Future  (s+30)            (Z11.59) Need for hepatitis C screening test  Comment:   Plan: Hepatitis C Screen Reflex to HCV RNA Quant and         Genotype            (M81.0) Osteoporosis, unspecified osteoporosis type, unspecified pathological fracture presence  Comment: Stable - check thyrod today   Plan: TSH with free T4 reflex            (K59.00) Constipation, unspecified constipation type  Comment:   Plan: TSH with free T4 reflex           Dysphagai  Comment: recommend X-ray esophogram [facility] phone #[phone] to schedule    Follow up in 3 months    25 minutes spent with patient.  Over 50% of time counseling       Disclaimer: This note consists of symbols derived from keyboarding, dictation and/or voice recognition software. As a result, there may be errors in the script that have gone undetected. Please consider this when interpreting information found in this chart.    Doug Godoy MD  [phone]

## 2018-01-29 NOTE — NURSING NOTE
"Chief Complaint   Patient presents with     Follow Up For     Depression        Initial /76 (BP Location: Left arm, Patient Position: Chair, Cuff Size: Adult Large)  Pulse 89  Temp 97.7  F (36.5  C) (Tympanic)  Ht 5' 5\" (1.651 m)  Wt 190 lb (86.2 kg)  LMP 09/08/2004  SpO2 99%  BMI 31.62 kg/m2 Estimated body mass index is 31.62 kg/(m^2) as calculated from the following:    Height as of this encounter: 5' 5\" (1.651 m).    Weight as of this encounter: 190 lb (86.2 kg)..    BP completed using cuff size: large  MEDICATIONS REVIEWED  SOCIAL AND FAMILY HX REVIEWED  Tiffanie Cuenca CMA  "

## 2018-01-29 NOTE — MR AVS SNAPSHOT
After Visit Summary   1/29/2018    Mary Kay Deal    MRN: [id number]           Patient Information     Date Of Birth          1960        Visit Information        Provider Department      1/29/2018 10:30 AM Doug Godoy MD [facility]        Today's Diagnoses     Chronic obstructive pulmonary disease, unspecified COPD type (H)    -  1    Mild pulmonary hypertension        Hyperlipidemia LDL goal <130        Pulmonary nodules        Visit for screening mammogram        Need for hepatitis C screening test        Osteoporosis, unspecified osteoporosis type, unspecified pathological fracture presence        Constipation, unspecified constipation type        Esophageal dysphagia          Care Instructions    (J44.9) Chronic obstructive pulmonary disease, unspecified COPD type (H)  (primary encounter diagnosis)  Comment: We will continue current inhalers and oxygen by nasal canula at 4 liters.    Plan: TSH with free T4 reflex            (I27.20) Mild pulmonary hypertension  Comment: Also stable - repeat echocardiogram requested - [facility] - [phone]   Plan: CBC with platelets, TSH with free T4 reflex            (E78.5) Hyperlipidemia LDL goal <130  Comment: continue simvastatin and check fasting lipid panel today  Plan: COMPREHENSIVE METABOLIC PANEL, Lipid panel         reflex to direct LDL Fasting, simvastatin         (ZOCOR) 20 MG tablet, CBC with platelets            (R91.8) Pulmonary nodules  Comment: Follow up in pulmonary clinic   Plan:     (Z12.31) Visit for screening mammogram  Comment:  [facility] [place] (also performs diagnostic mammogram, ultrasound and biopsy) [phone].   Plan: MA SCREENING DIGITAL BILAT - Future  (s+30)            (Z11.59) Need for hepatitis C screening test  Comment:   Plan: Hepatitis C Screen Reflex to HCV RNA Quant and         Genotype            (M81.0) Osteoporosis, unspecified osteoporosis type, unspecified  pathological fracture presence  Comment: Stable - check thyrod today   Plan: TSH with free T4 reflex            (K59.00) Constipation, unspecified constipation type  Comment:   Plan: TSH with free T4 reflex           Dysphagai  Comment: recommend X-ray esophogram [facility] phone #[phone] to schedule          Follow-ups after your visit        Your next 10 appointments already scheduled     Mar 19, 2018 10:30 AM CDT   CT CHEST W/O CONTRAST with SHCT1   [facility] ([facility])    [address] [address] [phone]           Please bring any scans or X-rays taken at other hospitals, if similar tests were done. Also bring a list of your medicines, including vitamins, minerals and over-the-counter drugs. It is safest to leave personal items at home.  Be sure to tell your doctor:   If you have any allergies.   If there s any chance you are pregnant.   If you are breastfeeding.   If you have any special needs.  You do not need to do anything special to prepare.  Please wear loose clothing, such as a sweat suit or jogging clothes. Avoid snaps, zippers and other metal. We may ask you to undress and put on a hospital gown.            Mar 20, 2018 10:00 AM CDT   PFT VISIT with ESTER GILES   [facility] Pulmonary Function Testing ([facility])    [address] [place] [id number] [phone]            Mar 20, 2018 10:30 AM CDT   Return Visit with Merrill Nevarez MD   Radiation Oncology Clinic ([facility])    [facility]  1st Floor  [address] [id number] [phone]            Mar 20, 2018 11:10 AM CDT   (Arrive by 10:55 AM)   Return Visit with Keith Mason MD   [facility] Solid Organ Transplant ([facility])    [address] [address] [phone]              Future tests that were  "ordered for you today     Open Future Orders        Priority Expected Expires Ordered    XR Esophagram w Upper GI Routine 1/29/2018 1/29/2019 1/29/2018    Echocardiogram Complete Routine  1/29/2019 1/29/2018    MA SCREENING DIGITAL BILAT - Future  (s+30) Routine  1/25/2019 1/29/2018            Who to contact     If you have questions or need follow up information about today's clinic visit or your schedule please contact [facility] directly at [phone].  Normal or non-critical lab and imaging results will be communicated to you by [employer], letter or phone within 4 business days after the clinic has received the results. If you do not hear from us within 7 days, please contact the clinic through [employer] or phone. If you have a critical or abnormal lab result, we will notify you by phone as soon as possible.  Submit refill requests through [employer] or call your pharmacy and they will forward the refill request to us. Please allow 3 business days for your refill to be completed.          Additional Information About Your Visit        [employer] Information     [employer] gives you secure access to your electronic health record. If you see a primary care provider, you can also send messages to your care team and make appointments. If you have questions, please call your primary care clinic.  If you do not have a primary care provider, please call [phone] and they will assist you.        Care EveryWhere ID     This is your Care EveryWhere ID. This could be used by other organizations to access your [facility] medical records  [id number]        Your Vitals Were     Pulse Temperature Height Last Period Pulse Oximetry BMI (Body Mass Index)    89 97.7  F (36.5  C) (Tympanic) 5' 5\" (1.651 m) 09/08/2004 99% 31.62 kg/m2       Blood Pressure from Last 3 Encounters:   01/29/18 141/76   10/18/17 (!) 151/97   09/20/17 128/84    Weight from Last 3 Encounters:   01/29/18 190 lb (86.2 kg)   10/18/17 190 lb (86.2 kg) "   08/11/17 189 lb (85.7 kg)              We Performed the Following     CBC with platelets     COMPREHENSIVE METABOLIC PANEL     Hepatitis C Screen Reflex to HCV RNA Quant and Genotype     Lipid panel reflex to direct LDL Fasting     TSH with free T4 reflex          Where to get your medicines      These medications were sent to [facility] [place], [place] - [address], [address]  [address], [address], [place] [address]     Phone:  [phone]     simvastatin 20 MG tablet          Primary Care Provider Office Phone # Fax #    Doug Godoy [phone] [address]  [place] [address]        Equal Access to Services     CARLOS PARKINSON : Hadii lashae lopezo Sorl, waaxda luqadaha, qaybta kaalmada adekylahyada, jarvis merino . So [facility] [phone].    ATENCIÓN: Si habla español, tiene a rene disposición servicios gratuitos de asistencia lingüística. Llame al [phone].    We comply with applicable federal civil rights laws and [place] laws. We do not discriminate on the basis of race, color, national origin, age, disability, sex, sexual orientation, or gender identity.            Thank you!     Thank you for choosing [facility]  for your care. Our goal is always to provide you with excellent care. Hearing back from our patients is one way we can continue to improve our services. Please take a few minutes to complete the written survey that you may receive in the mail after your visit with us. Thank you!             Your Updated Medication List - Protect others around you: Learn how to safely use, store and throw away your medicines at www.disposemymeds.org.          This list is accurate as of 1/29/18 11:09 AM.  Always use your most recent med list.                   Brand Name Dispense Instructions for use Diagnosis    arformoterol 15 MCG/2ML Nebu neb solution    BROVANA    120 mL    Take 2 mLs (15 mcg)  by nebulization 2 times daily    Centrilobular emphysema (H)       azithromycin 250 MG tablet    ZITHROMAX    30 tablet    TAKE ONE TABLET BY MOUTH EVERY DAY AS ANTIINFAMMATORY FOR COPD    COPD (chronic obstructive pulmonary disease) (H)       budesonide 1 MG/2ML Susp neb solution    PULMICORT     Take 2 mLs (1 mg) by nebulization 2 times daily    Centrilobular emphysema (H)       calcium carbonate 600 MG tablet   Generic drug:  calcium      Take 1 tablet by mouth 2 times daily. 1-2 tablets as tolerated        Chlorpheniramine-DM 4-20 MG Tabs     84 tablet    Take 1 tablet by mouth every 6 hours as needed.    COPD (chronic obstructive pulmonary disease) (H)       doxycycline 100 MG capsule    VIBRAMYCIN    20 capsule    Take 1 capsule (100 mg) by mouth 2 times daily    Emphysema of lung (H)       Fingertip Pulse Oximeter Misc     1 each    To be used as needed - for SOB.    COPD (chronic obstructive pulmonary disease) (H), Respiratory failure with hypoxia (H)       fluticasone 50 MCG/ACT spray    FLONASE    16 g    Spray 1 spray into both nostrils 2 times daily    COPD (chronic obstructive pulmonary disease) (H)       LORazepam 0.5 MG tablet    ATIVAN    90 tablet    Take 1 tablet (0.5 mg) by mouth every 8 hours as needed for anxiety    Anxiety       montelukast 10 MG tablet    SINGULAIR    30 tablet    Take 1 tablet (10 mg) by mouth every evening    Centrilobular emphysema (H)       MULTIVITAMIN TABS   OR      1 TABLET BY MOUTH DAILY        Nebulizer Compressor Kit     1 kit    1 Device 4 times daily    COPD (chronic obstructive pulmonary disease) (H)       omeprazole 20 MG CR capsule    priLOSEC    180 capsule    TAKE ONE CAPSULE BY MOUTH TWICE A DAY    Gastroesophageal reflux disease with esophagitis       order for DME      O2 at night and prn        order for DME     1 Device    Equipment being ordered: Oxygen --Please provide oxygen at 4 LPM via nasal canula with exertion and with sleep. Can be on room air at  rest. Please provide portability. Keep oxygen saturations above 90%.    COPD (chronic obstructive pulmonary disease) (H)       predniSONE 10 MG tablet    DELTASONE    23 tablet    Take 40 mg x 2 days, 30 mg x 2 days, 20 mg x 3 days and 10 mg x 3 days    Chronic obstructive pulmonary disease, unspecified COPD type (H)       SENNA LAXATIVE PO      2 TABLET BY MOUTH DAILY AS NEEDED FOR CONSTIPATION        sertraline 50 MG tablet    ZOLOFT    135 tablet    TAKE ONE AND ONE-HALF TABLET BY MOUTH EVERY DAY    Major depressive disorder, single episode, mild (H)       simvastatin 20 MG tablet    ZOCOR    90 tablet    Take 1 tablet (20 mg) by mouth At Bedtime    Hyperlipidemia LDL goal <130       * tiotropium 18 MCG capsule    SPIRIVA HANDIHALER    90 capsule    Inhale 1 capsule (18 mcg) into the lungs daily    COPD (chronic obstructive pulmonary disease) (H)       * SPIRIVA HANDIHALER 18 MCG capsule   Generic drug:  tiotropium     90 capsule    INHALE ONE CAPSULE INTO THE LUNGS EVERY DAY    COPD (chronic obstructive pulmonary disease) (H)       VENTOLIN  (90 BASE) MCG/ACT Inhaler   Generic drug:  albuterol     54 g    INHALE 2 PUFFS BY MOUTH INTO THE LUNGS EVERY 4 HOURS AS NEEDED    COPD (chronic obstructive pulmonary disease) (H), Oral thrush       vitamin D 1000 UNITS capsule      Take 1 capsule by mouth 2 times daily.        * Notice:  This list has 2 medication(s) that are the same as other medications prescribed for you. Read the directions carefully, and ask your doctor or other care provider to review them with you.

## 2018-01-29 NOTE — PATIENT INSTRUCTIONS
(J44.9) Chronic obstructive pulmonary disease, unspecified COPD type (H)  (primary encounter diagnosis)  Comment: We will continue current inhalers and oxygen by nasal canula at 4 liters.    Plan: TSH with free T4 reflex            (I27.20) Mild pulmonary hypertension  Comment: Also stable - repeat echocardiogram requested - [facility] - [phone]   Plan: CBC with platelets, TSH with free T4 reflex            (E78.5) Hyperlipidemia LDL goal <130  Comment: continue simvastatin and check fasting lipid panel today  Plan: COMPREHENSIVE METABOLIC PANEL, Lipid panel         reflex to direct LDL Fasting, simvastatin         (ZOCOR) 20 MG tablet, CBC with platelets            (R91.8) Pulmonary nodules  Comment: Follow up in pulmonary clinic   Plan:     (Z12.31) Visit for screening mammogram  Comment:  [facility] [place] (also performs diagnostic mammogram, ultrasound and biopsy) [phone].   Plan: MA SCREENING DIGITAL BILAT - Future  (s+30)            (Z11.59) Need for hepatitis C screening test  Comment:   Plan: Hepatitis C Screen Reflex to HCV RNA Quant and         Genotype            (M81.0) Osteoporosis, unspecified osteoporosis type, unspecified pathological fracture presence  Comment: Stable - check thyrod today   Plan: TSH with free T4 reflex            (K59.00) Constipation, unspecified constipation type  Comment:   Plan: TSH with free T4 reflex           Dysphagai  Comment: recommend X-ray esophogram [facility] phone #[phone] to schedule

## 2018-01-30 LAB
ALBUMIN SERPL-MCNC: 3.5 G/DL (ref 3.4–5)
ALP SERPL-CCNC: 61 U/L (ref 40–150)
ALT SERPL W P-5'-P-CCNC: 20 U/L (ref 0–50)
ANION GAP SERPL CALCULATED.3IONS-SCNC: 4 MMOL/L (ref 3–14)
AST SERPL W P-5'-P-CCNC: 17 U/L (ref 0–45)
BACTERIA SPEC CULT: NORMAL
BILIRUB SERPL-MCNC: 0.3 MG/DL (ref 0.2–1.3)
BUN SERPL-MCNC: 8 MG/DL (ref 7–30)
CALCIUM SERPL-MCNC: 8.4 MG/DL (ref 8.5–10.1)
CHLORIDE SERPL-SCNC: 100 MMOL/L (ref 94–109)
CHOLEST SERPL-MCNC: 171 MG/DL
CO2 SERPL-SCNC: 32 MMOL/L (ref 20–32)
CREAT SERPL-MCNC: 0.48 MG/DL (ref 0.52–1.04)
GFR SERPL CREATININE-BSD FRML MDRD: >90 ML/MIN/1.7M2
GLUCOSE SERPL-MCNC: 79 MG/DL (ref 70–99)
HDLC SERPL-MCNC: 77 MG/DL
LDLC SERPL CALC-MCNC: 82 MG/DL
NONHDLC SERPL-MCNC: 94 MG/DL
POTASSIUM SERPL-SCNC: 3.9 MMOL/L (ref 3.4–5.3)
PROT SERPL-MCNC: 6.9 G/DL (ref 6.8–8.8)
SODIUM SERPL-SCNC: 136 MMOL/L (ref 133–144)
SPECIMEN SOURCE: NORMAL
T4 FREE SERPL-MCNC: 1.26 NG/DL (ref 0.76–1.46)
TRIGL SERPL-MCNC: 58 MG/DL
TSH SERPL DL<=0.005 MIU/L-ACNC: 4.33 MU/L (ref 0.4–4)

## 2018-01-30 ASSESSMENT — PATIENT HEALTH QUESTIONNAIRE - PHQ9: SUM OF ALL RESPONSES TO PHQ QUESTIONS 1-9: 11

## 2018-01-31 ENCOUNTER — HOSPITAL ENCOUNTER (OUTPATIENT)
Dept: GENERAL RADIOLOGY | Facility: CLINIC | Age: 58
End: 2018-01-31
Attending: INTERNAL MEDICINE
Payer: MEDICARE

## 2018-01-31 ENCOUNTER — HOSPITAL ENCOUNTER (OUTPATIENT)
Dept: CARDIOLOGY | Facility: CLINIC | Age: 58
Discharge: HOME | End: 2018-01-31
Attending: INTERNAL MEDICINE | Admitting: INTERNAL MEDICINE
Payer: MEDICARE

## 2018-01-31 DIAGNOSIS — R13.19 ESOPHAGEAL DYSPHAGIA: ICD-10-CM

## 2018-01-31 DIAGNOSIS — I27.20 MILD PULMONARY HYPERTENSION (H): ICD-10-CM

## 2018-01-31 PROCEDURE — 93306 TTE W/DOPPLER COMPLETE: CPT

## 2018-01-31 PROCEDURE — 93306 TTE W/DOPPLER COMPLETE: CPT | Mod: 26 | Performed by: INTERNAL MEDICINE

## 2018-01-31 PROCEDURE — 25500064 ZZH RX 255 OP 636: Performed by: INTERNAL MEDICINE

## 2018-01-31 PROCEDURE — 74240 X-RAY XM UPR GI TRC 1CNTRST: CPT

## 2018-01-31 RX ADMIN — SULFUR HEXAFLUORIDE 5 ML: KIT at 13:31

## 2018-01-31 NOTE — PROGRESS NOTES
Nba Muñiz,    I had the opportunity to review your recent labs and a summary of your labs reads as follows:    Your complete blood counts show no sign of anemia, normal white blood cell count and platelets.  Your comprehensive metabolic panel showed normal renal function, normal liver function, and normal fasting blood glucose indicating no evidence of diabetes mellitus.  Your fasting lipid panel show  - normal HDL (good) cholesterol -as your goal is greater than 40  - low LDL (bad) cholesterol as your goal is less than 130  - normal triglyceride levels  Your TSH returned slightly elevated and I would recommend we recheck this again in 6 weeks    Your urinalysis shows evidence of contamination - no pathogenic organisms were seen    Sincerely,  Doug Godoy MD

## 2018-02-02 NOTE — PROGRESS NOTES
Nba Muñiz,    I have had the opportunity to review your recent results and an interpretation is as follows:  Congratulaions on your excellent results on your recent echocardiogram.  It appears to show normal cardiac function.  This is an good result.     Sincerely,  Doug Godoy MD

## 2018-02-02 NOTE — PROGRESS NOTES
Nba Muñiz,    I have had the opportunity to review your recent results and an interpretation is as follows:  Your X-ray shows only mild esophageal dysmotility, but no evidence of obstruction.  I would recommend that we follow up with gastroenterology for upper endoscopy if symptoms persist    Sincerely,  Doug Godoy MD

## 2018-02-26 DIAGNOSIS — J44.9 COPD (CHRONIC OBSTRUCTIVE PULMONARY DISEASE) (H): ICD-10-CM

## 2018-02-26 RX ORDER — AZITHROMYCIN 250 MG/1
TABLET, FILM COATED ORAL
Qty: 30 TABLET | Refills: 10 | Status: SHIPPED | OUTPATIENT
Start: 2018-02-26 | End: 2018-03-26

## 2018-03-19 ENCOUNTER — HOSPITAL ENCOUNTER (OUTPATIENT)
Dept: CT IMAGING | Facility: CLINIC | Age: 58
Discharge: HOME | End: 2018-03-19
Attending: RADIOLOGY | Admitting: RADIOLOGY
Payer: MEDICARE

## 2018-03-19 DIAGNOSIS — C34.12 MALIGNANT NEOPLASM OF UPPER LOBE OF LEFT LUNG (H): ICD-10-CM

## 2018-03-19 PROCEDURE — 71250 CT THORAX DX C-: CPT

## 2018-03-20 ENCOUNTER — OFFICE VISIT (OUTPATIENT)
Dept: RADIATION ONCOLOGY | Facility: CLINIC | Age: 58
End: 2018-03-20
Attending: RADIOLOGY
Payer: MEDICARE

## 2018-03-20 VITALS — DIASTOLIC BLOOD PRESSURE: 86 MMHG | SYSTOLIC BLOOD PRESSURE: 136 MMHG | OXYGEN SATURATION: 98 % | HEART RATE: 97 BPM

## 2018-03-20 DIAGNOSIS — C34.12 PRIMARY MALIGNANT NEOPLASM OF LEFT UPPER LOBE OF LUNG (H): Primary | ICD-10-CM

## 2018-03-20 DIAGNOSIS — C34.10: ICD-10-CM

## 2018-03-20 PROCEDURE — G0463 HOSPITAL OUTPT CLINIC VISIT: HCPCS | Performed by: NURSE PRACTITIONER

## 2018-03-20 ASSESSMENT — PAIN SCALES - GENERAL: PAINLEVEL: NO PAIN (0)

## 2018-03-20 NOTE — PROGRESS NOTES
RADIATION ONCOLOGY FOLLOW-UP  2018  NAME: Mary Kay Deal  MRN: [id number]  : 1960    DISEASE TREATED: Presumed NSCLC of the left upper lobe, [id number]    INTERVAL SINCE COMPLETION OF RADIOTHERAPY: ~4.5 years (Completed 2013)    TYPE OF RADIOTHERAPY GIVEN: SBRT 5000cGy in 5 fractions, 6MV, 80%IDL    SUBJECTIVE:   Mrs. Deal is a 57 year old woman with history of recurrent fungal infection and non-biopsy proven stage IA lung cancer of the RUBEN, s/p SBRT. She returns for routine follow-up.  The patient had interval CT scans the chest completed on 3/19/2018. This is been read as a stable exam without convincing recurrent or residual malignancy in the chest. Personal review of the imaging suggests a continued complete response, with slight interval decrease in the size of the patient's RUBEN lesion.  On exam today, Ms. Deal is overall doing well.  She continues on oxygen.  She occasionally has to take prednisone for shortness of breath, but estimates it is only every couple of months.  She is in a scooter. She otherwise denies fevers, chills, nausea, vomiting, diarrhea. A complete review of systems was otherwise unremarkable.  She does not feel as though the radiation has affected her lung function and has no side effects from the radiation.  She quit smoking with her lung cancer diagnosis.  OBJECTIVE:  /86  Pulse 97  LMP 2004  SpO2 98%  Gen: No acute distress. Alert, oriented.   Neck: No palpable LNs in the neck or supraclavicular areas.   CV: Regular rate and rhythm. No murmur.   Lungs: No wheezes, but decreased breath sounds throughout.    IMAGING:   CT scan 3/19/18  FINDINGS:  Lungs are severely emphysematous. Biapical fibrotic changes  again noted, stable. Scattered tiny nodules present bilaterally are  unchanged. No new pulmonary nodule or mass. No mediastinal, hilar, or  axillary adenopathy. No pleural or pericardial effusion. Mild coronary  calcifications. Small hiatal hernia.  Thyroid unremarkable. No  suspicious bony lesions.         IMPRESSION: No evidence of progressive or recurrent malignancy in the  chest.       IMPRESSION: Presumed NSCLC of left upper lobe, cT1a N0M0 with radiographic complete response to SBRT.     RECOMMENDATIONS: Follow up in 6 months with CT chest. Continue close follow up with pulmonology for her lung function and COPD.    Nicole Burton NP  Department of Radiation Oncology  [facility]

## 2018-03-20 NOTE — MR AVS SNAPSHOT
After Visit Summary   3/20/2018    Mary Kay Deal    MRN: [id number]           Patient Information     Date Of Birth          1960        Visit Information        Provider Department      3/20/2018 10:30 AM Nicole Burton APRN CNP [facility]        Today's Diagnoses     Primary malignant neoplasm of left upper lobe of lung (H)    -  1    Malignant neoplasm of upper lobe of lung (H)          Care Instructions    F/U CT chest w/o contrast and see Nicole in 6 months          Follow-ups after your visit        Follow-up notes from your care team     Return in about 6 months (around 9/20/2018).      Your next 10 appointments already scheduled     Mar 26, 2018  9:30 AM CDT   PFT VISIT with  PFL D   [facility]monary Function Testing ([facility])    [address]  3rd Floor  [facility] [address]   [phone]            Mar 26, 2018 10:00 AM CDT   (Arrive by 9:45 AM)   Return Visit with Keith Mason MD   [facility] ([facility])    [address]  [address] [place] [id number]   [phone]            Sep 17, 2018 10:30 AM CDT   (Arrive by 10:15 AM)   CT CHEST W/O CONTRAST with [address] ([facility])    [address] [place] [id number] [phone]           Please bring any scans or X-rays taken at other hospitals, if similar tests were done. Also bring a list of your medicines, including vitamins, minerals and over-the-counter drugs. It is safest to leave personal items at home.  Be sure to tell your doctor:   If you have any allergies.   If there s any chance you are pregnant.   If you are breastfeeding.  You do not need to do anything special to prepare for this exam.  Please wear loose clothing, such as a sweat suit or jogging clothes. Avoid snaps, zippers and other metal. We may ask you to undress and put on a  [facility] [place].            Sep 19, 2018 10:30 AM CDT   Return Visit with Merrill Nevarez MD   [facility] ([facility])    [facility]  1st Floor  [address] [facility] [address]   [phone]              Future tests that were ordered for you today     Open Future Orders        Priority Expected Expires Ordered    CT Chest w/o contrast Routine 9/20/2018 3/20/2019 3/20/2018            Who to contact     Please call your clinic at [phone] to:    Ask questions about your health    Make or cancel appointments    Discuss your medicines    Learn about your test results    Speak to your doctor            Additional Information About Your Visit        Replenish Information     Replenish gives you secure access to your electronic health record. If you see a primary care provider, you can also send messages to your care team and make appointments. If you have questions, please call your primary care clinic.  If you do not have a primary care provider, please call [phone] and they will assist you.      Replenish is an electronic gateway that provides easy, online access to your medical records. With Replenish, you can request a clinic appointment, read your test results, renew a prescription or communicate with your care team.     To access your existing account, please contact your [facility] or call [phone] for assistance.        Care EveryWhere ID     This is your Care EveryWhere ID. This could be used by other organizations to access your [facility] medical records  [id number]        Your Vitals Were     Pulse Last Period Pulse Oximetry             97 09/08/2004 98%          Blood Pressure from Last 3 Encounters:   03/20/18 136/86   01/29/18 141/76   10/18/17 (!) 151/97    Weight from Last 3 Encounters:   01/29/18 86.2 kg (190 lb)   10/18/17 86.2 kg (190 lb)   08/11/17 85.7 kg (189 lb)               Primary Care  Provider Office Phone # Fax #    Doug Godoy [phone] [address] [address]        Equal Access to Services     NIDHI PARKINSON : Hadii lashae ku hadherlindao Soomaali, waaxda luqadaha, qaybta kaalmada adeegrisa, jarvis martinez laImmanuelbubba cross. So [facility] [phone].    ATENCIÓN: Si habla español, tiene a rene disposición servicios gratuitos de asistencia lingüística. Llame al [phone].    We comply with applicable federal civil rights laws and [place] laws. We do not discriminate on the basis of race, color, national origin, age, disability, sex, sexual orientation, or gender identity.            Thank you!     Thank you for choosing [facility]  for your care. Our goal is always to provide you with excellent care. Hearing back from our patients is one way we can continue to improve our services. Please take a few minutes to complete the written survey that you may receive in the mail after your visit with us. Thank you!             Your Updated Medication List - Protect others around you: Learn how to safely use, store and throw away your medicines at www.disposemymeds.org.          This list is accurate as of 3/20/18 11:06 AM.  Always use your most recent med list.                   Brand Name Dispense Instructions for use Diagnosis    arformoterol 15 MCG/2ML Nebu neb solution    BROVANA    120 mL    Take 2 mLs (15 mcg) by nebulization 2 times daily    Centrilobular emphysema (H)       azithromycin 250 MG tablet    ZITHROMAX    30 tablet    TAKE ONE TABLET BY MOUTH EVERY DAY    COPD (chronic obstructive pulmonary disease) (H)       budesonide 1 MG/2ML Susp neb solution    PULMICORT     Take 2 mLs (1 mg) by nebulization 2 times daily    Centrilobular emphysema (H)       calcium carbonate 600 MG tablet   Generic drug:  calcium      Take 1 tablet by mouth 2 times daily. 1-2 tablets as tolerated        Chlorpheniramine-DM 4-20 MG Tabs     84  tablet    Take 1 tablet by mouth every 6 hours as needed.    COPD (chronic obstructive pulmonary disease) (H)       doxycycline 100 MG capsule    VIBRAMYCIN    20 capsule    Take 1 capsule (100 mg) by mouth 2 times daily    Emphysema of lung (H)       Fingertip Pulse Oximeter Misc     1 each    To be used as needed - for SOB.    COPD (chronic obstructive pulmonary disease) (H), Respiratory failure with hypoxia (H)       fluticasone 50 MCG/ACT spray    FLONASE    16 g    Spray 1 spray into both nostrils 2 times daily    COPD (chronic obstructive pulmonary disease) (H)       LORazepam 0.5 MG tablet    ATIVAN    90 tablet    Take 1 tablet (0.5 mg) by mouth every 8 hours as needed for anxiety    Anxiety       montelukast 10 MG tablet    SINGULAIR    30 tablet    Take 1 tablet (10 mg) by mouth every evening    Centrilobular emphysema (H)       MULTIVITAMIN TABS   OR      1 TABLET BY MOUTH DAILY        Nebulizer Compressor Kit     1 kit    1 Device 4 times daily    COPD (chronic obstructive pulmonary disease) (H)       omeprazole 20 MG CR capsule    priLOSEC    180 capsule    TAKE ONE CAPSULE BY MOUTH TWICE A DAY    Gastroesophageal reflux disease with esophagitis       order for DME      O2 at night and prn        order for DME     1 Device    Equipment being ordered: Oxygen --Please provide oxygen at 4 LPM via nasal canula with exertion and with sleep. Can be on room air at rest. Please provide portability. Keep oxygen saturations above 90%.    COPD (chronic obstructive pulmonary disease) (H)       predniSONE 10 MG tablet    DELTASONE    23 tablet    Take 40 mg x 2 days, 30 mg x 2 days, 20 mg x 3 days and 10 mg x 3 days    Chronic obstructive pulmonary disease, unspecified COPD type (H)       SENNA LAXATIVE PO      2 TABLET BY MOUTH DAILY AS NEEDED FOR CONSTIPATION        sertraline 50 MG tablet    ZOLOFT    135 tablet    TAKE ONE AND ONE-HALF TABLET BY MOUTH EVERY DAY    Major depressive disorder, single episode, mild  (H)       simvastatin 20 MG tablet    ZOCOR    90 tablet    Take 1 tablet (20 mg) by mouth At Bedtime    Hyperlipidemia LDL goal <130       * tiotropium 18 MCG capsule    SPIRIVA HANDIHALER    90 capsule    Inhale 1 capsule (18 mcg) into the lungs daily    COPD (chronic obstructive pulmonary disease) (H)       * SPIRIVA HANDIHALER 18 MCG capsule   Generic drug:  tiotropium     90 capsule    INHALE ONE CAPSULE INTO THE LUNGS EVERY DAY    COPD (chronic obstructive pulmonary disease) (H)       VENTOLIN  (90 BASE) MCG/ACT Inhaler   Generic drug:  albuterol     54 g    INHALE 2 PUFFS BY MOUTH INTO THE LUNGS EVERY 4 HOURS AS NEEDED    COPD (chronic obstructive pulmonary disease) (H), Oral thrush       vitamin D 1000 UNITS capsule      Take 1 capsule by mouth 2 times daily.        * Notice:  This list has 2 medication(s) that are the same as other medications prescribed for you. Read the directions carefully, and ask your doctor or other care provider to review them with you.

## 2018-03-20 NOTE — LETTER
3/20/2018       RE: Mary Kay Deal  [address]  [facility] [address]     Dear Colleague,    Thank you for referring your patient, Mary Kay Deal, to the [facility]. Please see a copy of my visit note below.    FOLLOW-UP VISIT    Patient Name: Mary Kay Deal      : 1960     Age: 57 year old        ______________________________________________________________________________     Chief Complaint   Patient presents with     Cancer     Follow up for Lung Cancer SBRT 5000cGy completed 13     /86  Pulse 97  LMP 2004  SpO2 98%     Date Radiation Completed: 13    Pain  Denies    Labs  Other none    Imaging  CT: 3/19/18          Other Appointments:     MD Name:  Appointment Date:    MD Name: Appointment Date:   MD Name: Appointment Date:   Other Appointment Notes:     Residual Radiation side effect: no side effects     Additional Instructions:     Nurse face-to-face time: Level 2:  5 min face to face time          RADIATION ONCOLOGY FOLLOW-UP  2018  NAME: Mary Kay Deal  MRN: [id number]  : 1960    DISEASE TREATED: Presumed NSCLC of the left upper lobe, [id number]    INTERVAL SINCE COMPLETION OF RADIOTHERAPY: ~4.5 years (Completed 2013)    TYPE OF RADIOTHERAPY GIVEN: SBRT 5000cGy in 5 fractions, 6MV, 80%IDL    SUBJECTIVE:   Mrs. Deal is a 57 year old woman with history of recurrent fungal infection and non-biopsy proven stage IA lung cancer of the RUBEN, s/p SBRT. She returns for routine follow-up.  The patient had interval CT scans the chest completed on 3/19/2018. This is been read as a stable exam without convincing recurrent or residual malignancy in the chest. Personal review of the imaging suggests a continued complete response, with slight interval decrease in the size of the patient's RUBEN lesion.  On exam today, Ms. Deal is overall doing well.  She continues on oxygen.  She occasionally has to take prednisone for shortness of breath, but estimates it is  only every couple of months.  She is in a scooter. She otherwise denies fevers, chills, nausea, vomiting, diarrhea. A complete review of systems was otherwise unremarkable.  She does not feel as though the radiation has affected her lung function and has no side effects from the radiation.  She quit smoking with her lung cancer diagnosis.  OBJECTIVE:  /86  Pulse 97  LMP 09/08/2004  SpO2 98%  Gen: No acute distress. Alert, oriented.   Neck: No palpable LNs in the neck or supraclavicular areas.   CV: Regular rate and rhythm. No murmur.   Lungs: No wheezes, but decreased breath sounds throughout.    IMAGING:   CT scan 3/19/18  FINDINGS:  Lungs are severely emphysematous. Biapical fibrotic changes  again noted, stable. Scattered tiny nodules present bilaterally are  unchanged. No new pulmonary nodule or mass. No mediastinal, hilar, or  axillary adenopathy. No pleural or pericardial effusion. Mild coronary  calcifications. Small hiatal hernia. Thyroid unremarkable. No  suspicious bony lesions.         IMPRESSION: No evidence of progressive or recurrent malignancy in the  chest.       IMPRESSION: Presumed NSCLC of left upper lobe, cT1a N0M0 with radiographic complete response to SBRT.     RECOMMENDATIONS: Follow up in 6 months with CT chest. Continue close follow up with pulmonology for her lung function and COPD.    Nicole Burton NP  Department of Radiation Oncology  [facility]

## 2018-03-20 NOTE — PROGRESS NOTES
FOLLOW-UP VISIT    Patient Name: Mary Kay Deal      : 1960     Age: 57 year old        ______________________________________________________________________________     Chief Complaint   Patient presents with     Cancer     Follow up for Lung Cancer SBRT 5000cGy completed 13     /86  Pulse 97  LMP 2004  SpO2 98%     Date Radiation Completed: 13    Pain  Denies    Labs  Other none    Imaging  CT: 3/19/18          Other Appointments:     MD Name:  Appointment Date:    MD Name: Appointment Date:   MD Name: Appointment Date:   Other Appointment Notes:     Residual Radiation side effect: no side effects     Additional Instructions:     Nurse face-to-face time: Level 2:  5 min face to face time

## 2018-03-26 ENCOUNTER — OFFICE VISIT (OUTPATIENT)
Dept: PULMONOLOGY | Facility: CLINIC | Age: 58
End: 2018-03-26
Attending: INTERNAL MEDICINE
Payer: MEDICARE

## 2018-03-26 VITALS
OXYGEN SATURATION: 98 % | BODY MASS INDEX: 31.65 KG/M2 | HEART RATE: 104 BPM | HEIGHT: 65 IN | RESPIRATION RATE: 17 BRPM | SYSTOLIC BLOOD PRESSURE: 133 MMHG | DIASTOLIC BLOOD PRESSURE: 90 MMHG | WEIGHT: 190 LBS

## 2018-03-26 DIAGNOSIS — J43.2 CENTRILOBULAR EMPHYSEMA (H): ICD-10-CM

## 2018-03-26 DIAGNOSIS — J44.9 CHRONIC AIRWAY OBSTRUCTION (H): Primary | ICD-10-CM

## 2018-03-26 DIAGNOSIS — J44.9 CHRONIC OBSTRUCTIVE PULMONARY DISEASE, UNSPECIFIED COPD TYPE (H): Primary | ICD-10-CM

## 2018-03-26 PROCEDURE — G0463 HOSPITAL OUTPT CLINIC VISIT: HCPCS | Mod: ZF

## 2018-03-26 RX ORDER — AZITHROMYCIN 250 MG/1
250 TABLET, FILM COATED ORAL DAILY
Qty: 30 TABLET | Refills: 11 | Status: SHIPPED | OUTPATIENT
Start: 2018-03-26 | End: 2019-04-03

## 2018-03-26 RX ORDER — ARFORMOTEROL TARTRATE 15 UG/2ML
15 SOLUTION RESPIRATORY (INHALATION) 2 TIMES DAILY
Qty: 120 ML | Refills: 11 | Status: SHIPPED | OUTPATIENT
Start: 2018-03-26 | End: 2019-09-17

## 2018-03-26 RX ORDER — TIOTROPIUM BROMIDE 18 UG/1
18 CAPSULE ORAL; RESPIRATORY (INHALATION) DAILY
Qty: 90 CAPSULE | Refills: 3 | Status: SHIPPED | OUTPATIENT
Start: 2018-03-26 | End: 2019-04-03

## 2018-03-26 RX ORDER — PREDNISONE 10 MG/1
TABLET ORAL
Qty: 23 TABLET | Refills: 1 | Status: SHIPPED | OUTPATIENT
Start: 2018-03-26 | End: 2018-09-07

## 2018-03-26 RX ORDER — FLUTICASONE PROPIONATE 50 MCG
1 SPRAY, SUSPENSION (ML) NASAL 2 TIMES DAILY
Qty: 16 G | Refills: 11 | Status: SHIPPED | OUTPATIENT
Start: 2018-03-26 | End: 2019-04-03

## 2018-03-26 RX ORDER — DOXYCYCLINE 100 MG/1
100 CAPSULE ORAL 2 TIMES DAILY
Qty: 20 CAPSULE | Refills: 1 | Status: SHIPPED | OUTPATIENT
Start: 2018-03-26 | End: 2018-09-07

## 2018-03-26 RX ORDER — BUDESONIDE 1 MG/2ML
1 INHALANT ORAL 2 TIMES DAILY
Qty: 60 AMPULE | Refills: 11 | Status: SHIPPED | OUTPATIENT
Start: 2018-03-26 | End: 2019-09-17

## 2018-03-26 RX ORDER — MONTELUKAST SODIUM 10 MG/1
10 TABLET ORAL EVERY EVENING
Qty: 30 TABLET | Refills: 11 | Status: SHIPPED | OUTPATIENT
Start: 2018-03-26 | End: 2019-04-03

## 2018-03-26 ASSESSMENT — PAIN SCALES - GENERAL: PAINLEVEL: NO PAIN (0)

## 2018-03-26 NOTE — PROGRESS NOTES
"Reason for Visit  Mary Kay Deal is a 57 year old female who is being seen for RECHECK (COPD)    Pulmonary HPI  The patient was seen and examined by Keith Mason MD     Mary Kay Deal is a 57 year old female with severe COPD on home O2.  She was empirically treated with SBRT for possible lung cancer (non biopsy proven) in the left UL in 8/2013. She has multiple pulmonary nodules.    Interval History:   The patient is doing \"all right\". She has had to use Prednisone a couple of times for a few days since last evaluation. The patient will feel a sensation in her chest, begin the Prednisone, and, upon expectorating sputum, will stop the Prednisone. Wheezing has improved from last week; had a day where she wheezed all day.     In addition, the patient has an intermittent sensation in the right-side of her throat. She has had this evaluated by GI who were not concerned for anything. When asked about heartburn, the patient states \"no burning,\" but sensation of something coming up. Last week, she had this sensation every day for a little bit; this week is better. The patient takes Prilosec one or two times a day. Sleeps with her head elevated.     She is sleeping better \"sometimes\". The patient will have a few days where she cannot sleep at all and, if continues for 2 or 3 days, she will take a Lorazepam. The patient has had a couple days of headaches upon waking up, but this resolves right away. For exercise, she does the treadmill for 5 minutes at a regular walking pace three times a week. Endorses edema in the left knee.    O2 Requirements/Use:   The patient uses continuous flow when she is at rest at home and demand flow when she is out of the house. The patient leaves it at 2 LPM at rest. While she is walking she requires 3-4 LPM. When she is sleeping she uses 4 LPM supplemental O2.    Current Outpatient Prescriptions   Medication     azithromycin (ZITHROMAX) 250 MG tablet     simvastatin (ZOCOR) 20 MG tablet     " sertraline (ZOLOFT) 50 MG tablet     omeprazole (PRILOSEC) 20 MG CR capsule     LORazepam (ATIVAN) 0.5 MG tablet     SPIRIVA HANDIHALER 18 MCG capsule     montelukast (SINGULAIR) 10 MG tablet     arformoterol (BROVANA) 15 MCG/2ML NEBU neb solution     budesonide (PULMICORT) 1 MG/2ML SUSP neb solution     predniSONE (DELTASONE) 10 MG tablet     fluticasone (FLONASE) 50 MCG/ACT spray     VENTOLIN  (90 BASE) MCG/ACT Inhaler     doxycycline (VIBRAMYCIN) 100 MG capsule     tiotropium (SPIRIVA HANDIHALER) 18 MCG inhalation capsule     order for DME     Respiratory Therapy Supplies (NEBULIZER COMPRESSOR) KIT     [facility]. Devices (FINGERTIP PULSE OXIMETER) MISC     Chlorpheniramine-DM 4-20 MG TABS     Cholecalciferol (VITAMIN D) 1000 UNIT capsule     Calcium (CALCIUM 600) 600 MG tablet     ORDER FOR DME     MULTIVITAMIN TABS   OR     SENNA LAXATIVE OR     No current facility-administered medications for this visit.      Allergies   Allergen Reactions     Penicillins Hives     Past Medical History:   Diagnosis Date     Aspergillosis, with pneumonia (H)      Cancer (H)     questionable lung CA     Chronic infection     Known fungal lung condition     Constipation      Disorder of bone and cartilage, unspecified      Emphysema      GERD      Hx of radiation therapy     For lung mass     Mild intermittent asthma      Other dyspnea and respiratory abnormality      Other malaise and fatigue      SMOKER        Past Surgical History:   Procedure Laterality Date     BREAST SURGERY      Tumor removal at the age of 15     COLONOSCOPY N/A 2014    Procedure: COLONOSCOPY;  Surgeon: Patricia Perry MD;  Location:  OR     COLONOSCOPY N/A 2015    Procedure: COMBINED COLONOSCOPY, SINGLE OR MULTIPLE BIOPSY/POLYPECTOMY BY BIOPSY;  Surgeon: Patricia Perry MD;  Location:  GI     ENT SURGERY      Tonsillectomy     GYN SURGERY       x 1     SURGICAL HISTORY OF -       s/p Breast tumor @ age 15 - benign      SURGICAL HISTORY OF -       c section     SURGICAL HISTORY OF -       tonsillectomy       Social History     Social History     Marital status:      Spouse name: N/A     Number of children: N/A     Years of education: N/A     Occupational History     Not on file.     Social History Main Topics     Smoking status: Former Smoker     Packs/day: 1.00     Years: 30.00     Types: Cigarettes     Quit date: 10/21/2005     Smokeless tobacco: Never Used     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     Drug use: No     Sexual activity: Yes     Partners: Male      Comment: vas     Other Topics Concern     Parent/Sibling W/ Cabg, Mi Or Angioplasty Before 65f 55m? Yes     Social History Narrative    , 3 children    Retired  at [employer]          The patient is currently on disability due to COPD.  She rides a scooter due to her lungs.  She used to work as a .  She and her  are living with their daughter in [place].  They have a [employer] mix.  The patient stopped smoking 9 years ago.  She smoked 1-2 packs of cigarettes per day for 30 years, consistent with 45-60 pack year history.  She denies cigars, pipes or chewing tobacco.  She smoked marijuana in her teens.  The patient is an alcoholic and has been sober for the past 27 years.  Caffeine intake includes 4-5 cups of caffeinated coffee every morning.  She does not drink tea.  She consumes 1 can of Diet Pepsi as late as 7:00 p.m.  She eats chocolate candy bars as late as 7:00 p.m.  She does not exercise.  She has a treadmill, so she could; however, as of this time she is not exercising.                ROS Pulmonary  Constitutional- Negative. She has been trying to watch her diet and eat healthier.  Eyes- Negative.  Ear, nose and throat- Negative.  Cardiac- Negative.  Pulm- See HPI  GI- Positive. She estimates having reflux about 1-2 times/week. The patient will have some reflux when she first goes to bed but will not notice  "it for the rest of the night. The patient continues to sleep with HOB elevation. She eats about 5 hours before going to bed, but will have night time snacks as well.   Genitourinary- Negative.  Musculoskeletal- Negative.   Neurology- Negative.  Dermatology- Negative.  Endocrine- Negative.  Lymphatic- Negative.  Psychiatry- Negative.  A complete ROS was otherwise negative except as noted in the HPI.    /90  Pulse 104  Resp 17  Ht 1.651 m (5' 5\")  Wt 86.2 kg (190 lb)  LMP 09/08/2004  SpO2 98%  BMI 31.62 kg/m2 on O2 NC.  Body mass index is 31.62 kg/(m^2).   Exam:   GENERAL APPEARANCE: Well developed, well nourished, alert, and in no apparent distress.  EYES: PERRL, EOMI  HENT: Nasal mucosa with no edema and no hyperemia. No nasal polyps.  EARS: Canals clear, TMs normal.  MOUTH: Oral mucosa is moist, without any lesions, no tonsillar enlargement, no oropharyngeal exudate.  NECK: Supple, no masses, no thyromegaly.  LYMPHATICS: No significant axillary, cervical, or supraclavicular nodes.  RESP: Normal percussion, very diminished air flow throughout. No crackles. No rhonchi. No wheezes.  CV: Normal S1, S2, regular rhythm, normal rate. No murmur. No rub. No gallop. No LE edema.    Results:  Recent Results (from the past 168 hour(s))   General PFT Lab (Please always keep checked)    Collection Time: 03/26/18  9:45 AM   Result Value Ref Range    FVC-Pred 3.32 L    FVC-Pre 1.50 L    FVC-%Pred-Pre 45 %    FEV1-Pre 0.41 L    FEV1-%Pred-Pre 15 %    FEV1FVC-Pred 78 %    FEV1FVC-Pre 27 %    FEFMax-Pred 6.51 L/sec    FEFMax-Pre 1.29 L/sec    FEFMax-%Pred-Pre 19 %    FEF2575-Pred 2.38 L/sec    FEF2575-Pre 0.20 L/sec    RMZ1140-%Pred-Pre 8 %    ExpTime-Pre 7.85 sec    FIFMax-Pre 2.46 L/sec    FEV1FEV6-Pred 81 %    FEV1FEV6-Pre 31 %       Assessment and Plan: Mary Kay Deal is a 57-year-old female has a longstanding history of severe obstructive lung disease who comes today to the clinic for follow up.    1. RUL Mass " /Aspergillus:  This lesion was biopsied by IR and it showed aspergillus and was treated with one month course of voriconazole. Due to lack of improvement it was stopped as per pt. This has remained stable. This was followed/managed by Dr. Pierre.    2. Emphysema: Very severe lung disease.  - Will cont O2 at night and with activity.  6MWT (1/22/2016): Walked 460 ft on 4 lpm NC. Hence recommended to use this during the day with exertion and while sleeping. Can be on RA at rest.   - Will cont flu vaccine every year and has received pneumonia vaccine 5 years apart x2 doses. Received PCV 13 12/5/2014.  - Cont spiriva and prn nebs/inhalers. Cont Budesonide/Brovana nebs.  - Cont azithromycin daily. Will follow Qtc prn --457msec on 6/13/2015  - She would benefit from doing outpt pulm rehab but she is unable to do it due to lack of transportation. She has a treadmill at home and is currently using it to exercise.  - Cont prn lorazepam.  - Cont singular daily.   3/26/2018: FEV1 relatively stable. Symptoms are stable. She has not had any progression of pulmonary nodules since 5 years ago.  - Will discuss about lung transplant at next visit as this will be 5yrs since SBRT in 9/2012; further evaluation and education needed.  - She was encouraged to lose weight.    Insomnia: Sleep study - no MADELIN noted (5/2014).    3. Pulmonary Nodules:    - Chest CT done on 08/3/12 showed a new left UL nodule that increased in size when followed. This was reviewed at Pulmonary nodule conference and given the risk of doing biopsy and after seeing Dr. Lorenzo she underwent SBRT. SBRT 5000cGy in 5 fractions over 3 months - last dose on 08/29/13.  Chest CT (1/2014):  1.Prominent spiculated mass, inflammatory process or scarring in the right lung apex appears not significantly changed. 2. Decrease in the prominence of the patchy opacities in the left upper lobe, presumed postradiation changes.  Chest CT (10/2014): 1. Spiculated right apical mass is  not significantly changed in size. 2. Some new or increased nodular densities from 07/08/2014. These include a lateral right lower lobe subpleural focus, and anterolateral left apical focus, and superior segment posterior lower lobe focus.  Chest CT 7/17/2015: 1. New findings on the 4/21/2015 chest CT have essentially resolved, including consolidation in the right middle lobe, left lower lobe, and a new right lower lobe pulmonary nodule. 2. There is a 3 mm left lower lobe pulmonary nodule that was new on the 7/8/2014 exam, and therefore demonstrates one-year of stability. Continued followup per Fleischner Society guidelines is recommended. 3. Centrilobular emphysema. 4. Small hiatal hernia.  Chest CT (1/22/16): Stable pulm nodules.  Chest CT (8/5/2016): Stable pulm nodules.  Chest CT (3/15/17): Stable pulmonary nodules and emphysematous change. Possible metallic foreign body in the transverse colon, of uncertain clinical significance.  --  She is followed by Radiation oncology and they will continue to determine appropriate imaging follow up. She will get a Chest CT in one month. We will at the minimum need yearly Chest CT scans.  Chest CT (3/19/18): Severely emphysematous. Stable biapical fibrotic changes. Stable pulmonary nodules.    4. Allergies:   - Cont flonase 1 spray each nostril BID.   - To do netipot (nasal rinses) prn. Patient has not started nasal rinses.   - Cont Singulair.    5. Obesity:   -  Strongly advised to loose weight.   - Encouraged patient to eat healthier and gradually increase her exercise for weight loss.  3/26/2018: Encouraged the patient to increase her exercise for weight loss.    6. GERD:   - Controlled on PPI at 20mg daily.   - Seen by GI for this and constipation. Recommended FODMAP diet trial, which pt has not instituted yet.   - She had some dysphagia (pills getting stuck) hence had esophagagram on 1/2018 which showed mild dysmotility. Recommended by Dr. Godoy to follow up with  GI.  3/26/2018: She was instructed to take Prilosec 20 mg BID to better control her GERD.    7. H/O Presumed Lung CA ([id number]): Continue follow up with Radiation oncology, they will decide about treatment regimen and CT's. This was Left UL lesion.  - TYPE OF RADIOTHERAPY GIVEN: SBRT 5000cGy in 5 fractions, 6MV, 80%IDL    F/u in six months with Spirometry. Will try to coordinate appointment with oncology (Tuesdays).      Scribe Disclosure:   I, Stephan Jang, am serving as a scribe; to document services personally performed by Keith Mason MD based on data collection and the provider's statements to me.     Provider Disclosure:  I agree with above History, Review of Systems, Physical exam and Plan.  I have reviewed the content of the documentation and have edited it as needed. I have personally performed the services documented here and the documentation accurately represents those services and the decisions I have made.      Electronically signed by:  Keith Mason MD.

## 2018-03-26 NOTE — MR AVS SNAPSHOT
After Visit Summary   3/26/2018    Mary Kay Deal    MRN: [id number]           Patient Information     Date Of Birth          1960        Visit Information        Provider Department      3/26/2018 10:00 AM Keith Mason MD [facility]        Today's Diagnoses     Chronic obstructive pulmonary disease, unspecified COPD type (H)    -  1    Centrilobular emphysema (H)           Follow-ups after your visit        Follow-up notes from your care team     Return in about 6 months (around 9/26/2018).      Your next 10 appointments already scheduled     Sep 17, 2018 10:30 AM CDT   (Arrive by 10:15 AM)   CT CHEST W/O CONTRAST with SHCT1   [facility] CT ([facility])    [address] [place] [id number] [phone]           Please bring any scans or X-rays taken at other hospitals, if similar tests were done. Also bring a list of your medicines, including vitamins, minerals and over-the-counter drugs. It is safest to leave personal items at home.  Be sure to tell your doctor:   If you have any allergies.   If there s any chance you are pregnant.   If you are breastfeeding.  You do not need to do anything special to prepare for this exam.  Please wear loose clothing, such as a sweat suit or jogging clothes. Avoid snaps, zippers and other metal. We may ask you to undress and put on a hospital gown.            Sep 19, 2018 10:30 AM CDT   Return Visit with Merrill Nevarez MD   Radiation Oncology Clinic ([facility])    [facility]  1st Floor  [address] [address] [phone]            Oct 01, 2018 11:00 AM CDT   PFT VISIT with  PFL D   [facility] Pulmonary Function Testing ([facility])    [address]  3rd [facility] [id number] [phone]            Oct 01, 2018 11:20 AM CDT   (Arrive by 11:05 AM)   Return Visit with Keith  "Mr Marlon MD   [facility] ([facility])    [address]  [address] [place] [id number] [phone]              Who to contact     If you have questions or need follow up information about today's clinic visit or your schedule please contact [facility] directly at [phone].  Normal or non-critical lab and imaging results will be communicated to you by [employer], letter or phone within 4 business days after the clinic has received the results. If you do not hear from us within 7 days, please contact the clinic through [employer] or phone. If you have a critical or abnormal lab result, we will notify you by phone as soon as possible.  Submit refill requests through Mettl or call your pharmacy and they will forward the refill request to us. Please allow 3 business days for your refill to be completed.          Additional Information About Your Visit        [employer] Fever Information     M[employer] gives you secure access to your electronic health record. If you see a primary care provider, you can also send messages to your care team and make appointments. If you have questions, please call your primary care clinic.  If you do not have a primary care provider, please call [phone] and they will assist you.        Care EveryWhere ID     This is your Care EveryWhere ID. This could be used by other organizations to access your [facility] medical records  [id number]        Your Vitals Were     Pulse Respirations Height Last Period Pulse Oximetry BMI (Body Mass Index)    104 17 1.651 m (5' 5\") 09/08/2004 98% 31.62 kg/m2       Blood Pressure from Last 3 Encounters:   No data found for BP    Weight from Last 3 Encounters:   No data found for Wt              Today, you had the following     No orders found for display         Today's Medication Changes          These changes are accurate as of 3/26/18 11:59 PM.  If you have " any questions, ask your nurse or doctor.               These medicines have changed or have updated prescriptions.        Dose/Directions    azithromycin 250 MG tablet   Commonly known as:  ZITHROMAX   This may have changed:  See the new instructions.   Used for:  Centrilobular emphysema (H)   Changed by:  Keith Mason MD        Dose:  250 mg   Take 1 tablet (250 mg) by mouth daily   Quantity:  30 tablet   Refills:  11       tiotropium 18 MCG capsule   Commonly known as:  SPIRIVA HANDIHALER   This may have changed:  See the new instructions.   Used for:  Centrilobular emphysema (H)   Changed by:  Keith Mason MD        Dose:  18 mcg   Inhale 1 capsule (18 mcg) into the lungs daily   Quantity:  90 capsule   Refills:  3            Where to get your medicines      These medications were sent to [facility], [place] - [address], [address]  [address], [address], [place] [address]     Phone:  [phone]     arformoterol 15 MCG/2ML Nebu neb solution    azithromycin 250 MG tablet    budesonide 1 MG/2ML Susp neb solution    doxycycline 100 MG capsule    fluticasone 50 MCG/ACT spray    montelukast 10 MG tablet    predniSONE 10 MG tablet    tiotropium 18 MCG capsule                Primary Care Provider Office Phone # Fax #    Doug Godoy [phone] [address]  [place] [address]        Equal Access to Services     [facility] AH: Hadii lashae ku hadasho Sosriali, waaxda luqadaha, qaybta kaalmada adeegyada, jarvis merino . So [facility] [phone].    ATENCIÓN: Si habla español, tiene a rene disposición servicios gratuitos de asistencia lingüística. Llame al [phone].    We comply with applicable federal civil rights laws and [place] laws. We do not discriminate on the basis of race, color, national origin, age, disability, sex, sexual orientation, or gender identity.            Thank you!     Thank you  for [facility]  for your care. Our goal is always to provide you with excellent care. Hearing back from our patients is one way we can continue to improve our services. Please take a few minutes to complete the written survey that you may receive in the mail after your visit with us. Thank you!             Your Updated Medication List - Protect others around you: Learn how to safely use, store and throw away your medicines at www.disposemymeds.org.          This list is accurate as of 3/26/18 11:59 PM.  Always use your most recent med list.                   Brand Name Dispense Instructions for use Diagnosis    arformoterol 15 MCG/2ML Nebu neb solution    BROVANA    120 mL    Take 2 mLs (15 mcg) by nebulization 2 times daily    Centrilobular emphysema (H)       azithromycin 250 MG tablet    ZITHROMAX    30 tablet    Take 1 tablet (250 mg) by mouth daily    Centrilobular emphysema (H)       budesonide 1 MG/2ML Susp neb solution    PULMICORT    60 ampule    Take 2 mLs (1 mg) by nebulization 2 times daily    Centrilobular emphysema (H)       calcium carbonate 600 MG tablet   Generic drug:  calcium      Take 1 tablet by mouth 2 times daily. 1-2 tablets as tolerated        Chlorpheniramine-DM 4-20 MG Tabs     84 tablet    Take 1 tablet by mouth every 6 hours as needed.    COPD (chronic obstructive pulmonary disease) (H)       doxycycline 100 MG capsule    VIBRAMYCIN    20 capsule    Take 1 capsule (100 mg) by mouth 2 times daily    Centrilobular emphysema (H)       Fingertip Pulse Oximeter Misc     1 each    To be used as needed - for SOB.    COPD (chronic obstructive pulmonary disease) (H), Respiratory failure with hypoxia (H)       fluticasone 50 MCG/ACT spray    FLONASE    16 g    Spray 1 spray into both nostrils 2 times daily    Centrilobular emphysema (H)       LORazepam 0.5 MG tablet    ATIVAN    90 tablet    Take 1 tablet (0.5 mg) by mouth every 8 hours as needed for  anxiety    Anxiety       montelukast 10 MG tablet    SINGULAIR    30 tablet    Take 1 tablet (10 mg) by mouth every evening    Centrilobular emphysema (H)       MULTIVITAMIN TABS   OR      1 TABLET BY MOUTH DAILY        Nebulizer Compressor Kit     1 kit    1 Device 4 times daily    COPD (chronic obstructive pulmonary disease) (H)       omeprazole 20 MG CR capsule    priLOSEC    180 capsule    TAKE ONE CAPSULE BY MOUTH TWICE A DAY    Gastroesophageal reflux disease with esophagitis       order for DME      O2 at night and prn        order for DME     1 Device    Equipment being ordered: Oxygen --Please provide oxygen at 4 LPM via nasal canula with exertion and with sleep. Can be on room air at rest. Please provide portability. Keep oxygen saturations above 90%.    COPD (chronic obstructive pulmonary disease) (H)       predniSONE 10 MG tablet    DELTASONE    23 tablet    Take 40 mg x 2 days, 30 mg x 2 days, 20 mg x 3 days and 10 mg x 3 days    Chronic obstructive pulmonary disease, unspecified COPD type (H)       SENNA LAXATIVE PO      2 TABLET BY MOUTH DAILY AS NEEDED FOR CONSTIPATION        sertraline 50 MG tablet    ZOLOFT    135 tablet    TAKE ONE AND ONE-HALF TABLET BY MOUTH EVERY DAY    Major depressive disorder, single episode, mild (H)       simvastatin 20 MG tablet    ZOCOR    90 tablet    Take 1 tablet (20 mg) by mouth At Bedtime    Hyperlipidemia LDL goal <130       tiotropium 18 MCG capsule    SPIRIVA HANDIHALER    90 capsule    Inhale 1 capsule (18 mcg) into the lungs daily    Centrilobular emphysema (H)       VENTOLIN  (90 BASE) MCG/ACT Inhaler   Generic drug:  albuterol     54 g    INHALE 2 PUFFS BY MOUTH INTO THE LUNGS EVERY 4 HOURS AS NEEDED    COPD (chronic obstructive pulmonary disease) (H), Oral thrush       vitamin D 1000 UNITS capsule      Take 1 capsule by mouth 2 times daily.

## 2018-03-26 NOTE — LETTER
"3/26/2018       RE: Mary Kay Deal  [address]  [facility] [address]     Dear Colleague,    Thank you for referring your patient, Mary Kay Deal, to the [facility] at [facility]. Please see a copy of my visit note below.    Reason for Visit  Mary Kay Deal is a 57 year old female who is being seen for RECHECK (COPD)    Pulmonary HPI  The patient was seen and examined by Keith Mason MD     Mary Kay Deal is a 57 year old female with severe COPD on home O2.  She was empirically treated with SBRT for possible lung cancer (non biopsy proven) in the left UL in 8/2013. She has multiple pulmonary nodules.    Interval History:   The patient is doing \"all right\". She has had to use Prednisone a couple of times for a few days since last evaluation. The patient will feel a sensation in her chest, begin the Prednisone, and, upon expectorating sputum, will stop the Prednisone. Wheezing has improved from last week; had a day where she wheezed all day.     In addition, the patient has an intermittent sensation in the right-side of her throat. She has had this evaluated by GI who were not concerned for anything. When asked about heartburn, the patient states \"no burning,\" but sensation of something coming up. Last week, she had this sensation every day for a little bit; this week is better. The patient takes Prilosec one or two times a day. Sleeps with her head elevated.     She is sleeping better \"sometimes\". The patient will have a few days where she cannot sleep at all and, if continues for 2 or 3 days, she will take a Lorazepam. The patient has had a couple days of headaches upon waking up, but this resolves right away. For exercise, she does the treadmill for 5 minutes at a regular walking pace three times a week. Endorses edema in the left knee.    O2 Requirements/Use:   The patient uses continuous flow when she is at rest at home and demand flow when " she is out of the house. The patient leaves it at 2 LPM at rest. While she is walking she requires 3-4 LPM. When she is sleeping she uses 4 LPM supplemental O2.    Current Outpatient Prescriptions   Medication     azithromycin (ZITHROMAX) 250 MG tablet     simvastatin (ZOCOR) 20 MG tablet     sertraline (ZOLOFT) 50 MG tablet     omeprazole (PRILOSEC) 20 MG CR capsule     LORazepam (ATIVAN) 0.5 MG tablet     SPIRIVA HANDIHALER 18 MCG capsule     montelukast (SINGULAIR) 10 MG tablet     arformoterol (BROVANA) 15 MCG/2ML NEBU neb solution     budesonide (PULMICORT) 1 MG/2ML SUSP neb solution     predniSONE (DELTASONE) 10 MG tablet     fluticasone (FLONASE) 50 MCG/ACT spray     VENTOLIN  (90 BASE) MCG/ACT Inhaler     doxycycline (VIBRAMYCIN) 100 MG capsule     tiotropium (SPIRIVA HANDIHALER) 18 MCG inhalation capsule     order for DME     Respiratory Therapy Supplies (NEBULIZER COMPRESSOR) KIT     [facility]. Devices (FINGERTIP PULSE OXIMETER) MISC     Chlorpheniramine-DM 4-20 MG TABS     Cholecalciferol (VITAMIN D) 1000 UNIT capsule     Calcium (CALCIUM 600) 600 MG tablet     ORDER FOR DME     MULTIVITAMIN TABS   OR     SENNA LAXATIVE OR     No current facility-administered medications for this visit.      Allergies   Allergen Reactions     Penicillins Hives     Past Medical History:   Diagnosis Date     Aspergillosis, with pneumonia (H)      Cancer (H)     questionable lung CA     Chronic infection     Known fungal lung condition     Constipation      Disorder of bone and cartilage, unspecified      Emphysema      GERD      Hx of radiation therapy 2013    For lung mass     Mild intermittent asthma      Other dyspnea and respiratory abnormality      Other malaise and fatigue      SMOKER        Past Surgical History:   Procedure Laterality Date     BREAST SURGERY      Tumor removal at the age of 15     COLONOSCOPY N/A 8/20/2014    Procedure: COLONOSCOPY;  Surgeon: Patricia Perry MD;  Location:  OR      COLONOSCOPY N/A 2015    Procedure: COMBINED COLONOSCOPY, SINGLE OR MULTIPLE BIOPSY/POLYPECTOMY BY BIOPSY;  Surgeon: Patricia Perry MD;  Location:  GI     ENT SURGERY      Tonsillectomy     GYN SURGERY       x 1     SURGICAL HISTORY OF -       s/p Breast tumor @ age 15 - benign     SURGICAL HISTORY OF -       c section     SURGICAL HISTORY OF -       tonsillectomy       Social History     Social History     Marital status:      Spouse name: N/A     Number of children: N/A     Years of education: N/A     Occupational History     Not on file.     Social History Main Topics     Smoking status: Former Smoker     Packs/day: 1.00     Years: 30.00     Types: Cigarettes     Quit date: 10/21/2005     Smokeless tobacco: Never Used     Alcohol use No      Comment: sober since      Drug use: No     Sexual activity: Yes     Partners: Male      Comment: vas     Other Topics Concern     Parent/Sibling W/ Cabg, Mi Or Angioplasty Before 65f 55m? Yes     Social History Narrative    , 3 children    Retired  at [employer]          The patient is currently on disability due to COPD.  She rides a scooter due to her lungs.  She used to work as a .  She and her  are living with their daughter in [place].  They have a [employer] mix.  The patient stopped smoking 9 years ago.  She smoked 1-2 packs of cigarettes per day for 30 years, consistent with 45-60 pack year history.  She denies cigars, pipes or chewing tobacco.  She smoked marijuana in her teens.  The patient is an alcoholic and has been sober for the past 27 years.  Caffeine intake includes 4-5 cups of caffeinated coffee every morning.  She does not drink tea.  She consumes 1 can of Diet Pepsi as late as 7:00 p.m.  She eats chocolate candy bars as late as 7:00 p.m.  She does not exercise.  She has a treadmill, so she could; however, as of this time she is not exercising.                ROS  "Pulmonary  Constitutional- Negative. She has been trying to watch her diet and eat healthier.  Eyes- Negative.  Ear, nose and throat- Negative.  Cardiac- Negative.  Pulm- See HPI  GI- Positive. She estimates having reflux about 1-2 times/week. The patient will have some reflux when she first goes to bed but will not notice it for the rest of the night. The patient continues to sleep with HOB elevation. She eats about 5 hours before going to bed, but will have night time snacks as well.   Genitourinary- Negative.  Musculoskeletal- Negative.   Neurology- Negative.  Dermatology- Negative.  Endocrine- Negative.  Lymphatic- Negative.  Psychiatry- Negative.  A complete ROS was otherwise negative except as noted in the HPI.    /90  Pulse 104  Resp 17  Ht 1.651 m (5' 5\")  Wt 86.2 kg (190 lb)  LMP 09/08/2004  SpO2 98%  BMI 31.62 kg/m2 on O2 NC.  Body mass index is 31.62 kg/(m^2).   Exam:   GENERAL APPEARANCE: Well developed, well nourished, alert, and in no apparent distress.  EYES: PERRL, EOMI  HENT: Nasal mucosa with no edema and no hyperemia. No nasal polyps.  EARS: Canals clear, TMs normal.  MOUTH: Oral mucosa is moist, without any lesions, no tonsillar enlargement, no oropharyngeal exudate.  NECK: Supple, no masses, no thyromegaly.  LYMPHATICS: No significant axillary, cervical, or supraclavicular nodes.  RESP: Normal percussion, very diminished air flow throughout. No crackles. No rhonchi. No wheezes.  CV: Normal S1, S2, regular rhythm, normal rate. No murmur. No rub. No gallop. No LE edema.    Results:  Recent Results (from the past 168 hour(s))   General PFT Lab (Please always keep checked)    Collection Time: 03/26/18  9:45 AM   Result Value Ref Range    FVC-Pred 3.32 L    FVC-Pre 1.50 L    FVC-%Pred-Pre 45 %    FEV1-Pre 0.41 L    FEV1-%Pred-Pre 15 %    FEV1FVC-Pred 78 %    FEV1FVC-Pre 27 %    FEFMax-Pred 6.51 L/sec    FEFMax-Pre 1.29 L/sec    FEFMax-%Pred-Pre 19 %    FEF2575-Pred 2.38 L/sec    " FEF2575-Pre 0.20 L/sec    UVV8215-%Pred-Pre 8 %    ExpTime-Pre 7.85 sec    FIFMax-Pre 2.46 L/sec    FEV1FEV6-Pred 81 %    FEV1FEV6-Pre 31 %       Assessment and Plan: Mary Kay Deal is a 57-year-old female has a longstanding history of severe obstructive lung disease who comes today to the clinic for follow up.    1. RUL Mass /Aspergillus:  This lesion was biopsied by IR and it showed aspergillus and was treated with one month course of voriconazole. Due to lack of improvement it was stopped as per pt. This has remained stable. This was followed/managed by Dr. Pierre.    2. Emphysema: Very severe lung disease.  - Will cont O2 at night and with activity.  6MWT (1/22/2016): Walked 460 ft on 4 lpm NC. Hence recommended to use this during the day with exertion and while sleeping. Can be on RA at rest.   - Will cont flu vaccine every year and has received pneumonia vaccine 5 years apart x2 doses. Received PCV 13 12/5/2014.  - Cont spiriva and prn nebs/inhalers. Cont Budesonide/Brovana nebs.  - Cont azithromycin daily. Will follow Qtc prn --457msec on 6/13/2015  - She would benefit from doing outpt pulm rehab but she is unable to do it due to lack of transportation. She has a treadmill at home and is currently using it to exercise.  - Cont prn lorazepam.  - Cont singular daily.   3/26/2018: FEV1 relatively stable. Symptoms are stable. She has not had any progression of pulmonary nodules since 5 years ago.  - Will discuss about lung transplant at next visit as this will be 5yrs since SBRT in 9/2012; further evaluation and education needed.  - She was encouraged to lose weight.    Insomnia: Sleep study - no MADELIN noted (5/2014).    3. Pulmonary Nodules:    - Chest CT done on 08/3/12 showed a new left UL nodule that increased in size when followed. This was reviewed at Pulmonary nodule conference and given the risk of doing biopsy and after seeing Dr. Lorenzo she underwent SBRT. SBRT 5000cGy in 5 fractions over 3 months - last  dose on 08/29/13.  Chest CT (1/2014):  1.Prominent spiculated mass, inflammatory process or scarring in the right lung apex appears not significantly changed. 2. Decrease in the prominence of the patchy opacities in the left upper lobe, presumed postradiation changes.  Chest CT (10/2014): 1. Spiculated right apical mass is not significantly changed in size. 2. Some new or increased nodular densities from 07/08/2014. These include a lateral right lower lobe subpleural focus, and anterolateral left apical focus, and superior segment posterior lower lobe focus.  Chest CT 7/17/2015: 1. New findings on the 4/21/2015 chest CT have essentially resolved, including consolidation in the right middle lobe, left lower lobe, and a new right lower lobe pulmonary nodule. 2. There is a 3 mm left lower lobe pulmonary nodule that was new on the 7/8/2014 exam, and therefore demonstrates one-year of stability. Continued followup per Fleischner Society guidelines is recommended. 3. Centrilobular emphysema. 4. Small hiatal hernia.  Chest CT (1/22/16): Stable pulm nodules.  Chest CT (8/5/2016): Stable pulm nodules.  Chest CT (3/15/17): Stable pulmonary nodules and emphysematous change. Possible metallic foreign body in the transverse colon, of uncertain clinical significance.  --  She is followed by Radiation oncology and they will continue to determine appropriate imaging follow up. She will get a Chest CT in one month. We will at the minimum need yearly Chest CT scans.  Chest CT (3/19/18): Severely emphysematous. Stable biapical fibrotic changes. Stable pulmonary nodules.    4. Allergies:   - Cont flonase 1 spray each nostril BID.   - To do netipot (nasal rinses) prn. Patient has not started nasal rinses.   - Cont Singulair.    5. Obesity:   -  Strongly advised to loose weight.   - Encouraged patient to eat healthier and gradually increase her exercise for weight loss.  3/26/2018: Encouraged the patient to increase her exercise for  weight loss.    6. GERD:   - Controlled on PPI at 20mg daily.   - Seen by GI for this and constipation. Recommended FODMAP diet trial, which pt has not instituted yet.   - She had some dysphagia (pills getting stuck) hence had esophagagram on 1/2018 which showed mild dysmotility. Recommended by Dr. Godoy to follow up with GI.  3/26/2018: She was instructed to take Prilosec 20 mg BID to better control her GERD.    7. H/O Presumed Lung CA ([id number]): Continue follow up with Radiation oncology, they will decide about treatment regimen and CT's. This was Left UL lesion.  - TYPE OF RADIOTHERAPY GIVEN: SBRT 5000cGy in 5 fractions, 6MV, 80%IDL    F/u in six months with Spirometry. Will try to coordinate appointment with oncology (Tuesdays).      Scribe Disclosure:   I, Stephan Jang, am serving as a scribe; to document services personally performed by Keith Mason MD based on data collection and the provider's statements to me.     Provider Disclosure:  I agree with above History, Review of Systems, Physical exam and Plan.  I have reviewed the content of the documentation and have edited it as needed. I have personally performed the services documented here and the documentation accurately represents those services and the decisions I have made.      Electronically signed by:  Keith Mason MD.

## 2018-04-05 ENCOUNTER — TELEPHONE (OUTPATIENT)
Dept: FAMILY MEDICINE | Facility: CLINIC | Age: 58
End: 2018-04-05

## 2018-04-05 ENCOUNTER — OFFICE VISIT (OUTPATIENT)
Dept: URGENT CARE | Facility: URGENT CARE | Age: 58
End: 2018-04-05
Payer: MEDICARE

## 2018-04-05 VITALS
OXYGEN SATURATION: 96 % | HEART RATE: 102 BPM | DIASTOLIC BLOOD PRESSURE: 94 MMHG | RESPIRATION RATE: 16 BRPM | SYSTOLIC BLOOD PRESSURE: 158 MMHG | TEMPERATURE: 97.1 F

## 2018-04-05 DIAGNOSIS — R00.0 RAPID HEART BEAT: Primary | ICD-10-CM

## 2018-04-05 DIAGNOSIS — R07.89 CHEST TIGHTNESS: ICD-10-CM

## 2018-04-05 PROCEDURE — 99214 OFFICE O/P EST MOD 30 MIN: CPT | Performed by: FAMILY MEDICINE

## 2018-04-05 PROCEDURE — 93000 ELECTROCARDIOGRAM COMPLETE: CPT | Performed by: FAMILY MEDICINE

## 2018-04-05 RX ORDER — IPRATROPIUM BROMIDE AND ALBUTEROL SULFATE 2.5; .5 MG/3ML; MG/3ML
1 SOLUTION RESPIRATORY (INHALATION) ONCE
Qty: 3 ML | Refills: 0
Start: 2018-04-05 | End: 2018-04-05

## 2018-04-05 NOTE — TELEPHONE ENCOUNTER
"Patient states she had some \"funny feelings\" in her chest.  Felt like some heart fluttering  Took O2 sats which has HR monitor.  HR noted as 103-117.  Has stayed this way for about an hour.  Denies any other associated symptoms.  Patient does have Emphysema.  O2 Sats 98% on 4L of O2.  Denies chest pain, sob, headache, weakness.  Recommended ER IF symptoms changed or get worse.  Otherwise, UC or Office visit.  Patient agreed.  Cher Juarez RN        "

## 2018-04-05 NOTE — TELEPHONE ENCOUNTER
Reason for call:  Patient reporting a symptom    Symptom or request: Raise pulse of over 100 for over an hour    Duration (how long have symptoms been present): over an hour    Have you been treated for this before? No    Additional comments:     Phone Number patient can be reached at:  Cell number on file:    Telephone Information:   Mobile [phone]       Best Time:  Any     Can we leave a detailed message on this number:  YES    Call taken on 4/5/2018 at 6:16 PM by Leta Carmichael

## 2018-04-05 NOTE — MR AVS SNAPSHOT
After Visit Summary   4/5/2018    Mary Kay Deal    MRN: [id number]           Patient Information     Date Of Birth          1960        Visit Information        Provider Department      4/5/2018 7:15 PM Tamy Bertrand MD [facility]        Today's Diagnoses     Rapid heart beat    -  1    Chest tightness           Follow-ups after your visit        Your next 10 appointments already scheduled     Sep 17, 2018 10:30 AM CDT   (Arrive by 10:15 AM)   CT CHEST W/O CONTRAST with SHCT1   [facility] CT ([facility])    [address] [place] [id number] [phone]           Please bring any scans or X-rays taken at other hospitals, if similar tests were done. Also bring a list of your medicines, including vitamins, minerals and over-the-counter drugs. It is safest to leave personal items at home.  Be sure to tell your doctor:   If you have any allergies.   If there s any chance you are pregnant.   If you are breastfeeding.  You do not need to do anything special to prepare for this exam.  Please wear loose clothing, such as a sweat suit or jogging clothes. Avoid snaps, zippers and other metal. We may ask you to undress and put on a hospital gown.            Sep 19, 2018 10:30 AM CDT   Return Visit with Merrill Nevarez MD   Radiation Oncology Clinic ([facility])    [facility]  1st Floor  [address] [address] [phone]            Oct 01, 2018 11:00 AM CDT   PFT VISIT with  LAURYN GILES   [facility] Pulmonary Function Testing ([facility])    [address]  3rd Floor  [facility] [id number] [phone]            Oct 01, 2018 11:20 AM CDT   (Arrive by 11:05 AM)   Return Visit with Keith Mason MD   [facility] ([facility])    [address]  [address]  [facility] [address]    [phone]              Who to contact     If you have questions or need follow up information about today's clinic visit or your schedule please contact [facility] directly at [phone].  Normal or non-critical lab and imaging results will be communicated to you by MyChart, letter or phone within 4 business days after the clinic has received the results. If you do not hear from us within 7 days, please contact the clinic through MyChart or phone. If you have a critical or abnormal lab result, we will notify you by phone as soon as possible.  Submit refill requests through [facility] or call your pharmacy and they will forward the refill request to us. Please allow 3 business days for your refill to be completed.          Additional Information About Your Visit        [employer] Information     [facility] gives you secure access to your electronic health record. If you see a primary care provider, you can also send messages to your care team and make appointments. If you have questions, please call your primary care clinic.  If you do not have a primary care provider, please call [phone] and they will assist you.        Care EveryWhere ID     This is your Care EveryWhere ID. This could be used by other organizations to access your [facility] medical records  [id number]        Your Vitals Were     Pulse Temperature Respirations Last Period Pulse Oximetry       102 97.1  F (36.2  C) (Tympanic) 16 09/08/2004 96%        Blood Pressure from Last 3 Encounters:   04/05/18 (!) 158/94   03/26/18 133/90   03/20/18 136/86    Weight from Last 3 Encounters:   03/26/18 190 lb (86.2 kg)   01/29/18 190 lb (86.2 kg)   10/18/17 190 lb (86.2 kg)              We Performed the Following     EKG 12-lead complete w/read - Clinics        Primary Care Provider Office Phone # Fax #    Doug Godoy [phone] [address]  [facility] [address]        Equal Access to Services      [facility]: Hadii aad ku ting Rosa, waaxda luqadaha, qaybta kaalmada adeedda, jarvis bacilio tanmayaugusto rocayadicecilia merino . So [facility] [phone].    ATENCIÓN: Si habla espzeeshan, tiene a rene disposición servicios gratuitos de asistencia lingüística. Llame al [phone].    We comply with applicable federal civil rights laws and [place] laws. We do not discriminate on the basis of race, color, national origin, age, disability, sex, sexual orientation, or gender identity.            Thank you!     Thank you for choosing [facility]  for your care. Our goal is always to provide you with excellent care. Hearing back from our patients is one way we can continue to improve our services. Please take a few minutes to complete the written survey that you may receive in the mail after your visit with us. Thank you!             Your Updated Medication List - Protect others around you: Learn how to safely use, store and throw away your medicines at www.disposemymeds.org.          This list is accurate as of 4/5/18  9:20 PM.  Always use your most recent med list.                   Brand Name Dispense Instructions for use Diagnosis    arformoterol 15 MCG/2ML Nebu neb solution    BROVANA    120 mL    Take 2 mLs (15 mcg) by nebulization 2 times daily    Centrilobular emphysema (H)       azithromycin 250 MG tablet    ZITHROMAX    30 tablet    Take 1 tablet (250 mg) by mouth daily    Centrilobular emphysema (H)       budesonide 1 MG/2ML Susp neb solution    PULMICORT    60 ampule    Take 2 mLs (1 mg) by nebulization 2 times daily    Centrilobular emphysema (H)       calcium carbonate 600 MG tablet   Generic drug:  calcium      Take 1 tablet by mouth 2 times daily. 1-2 tablets as tolerated        Chlorpheniramine-DM 4-20 MG Tabs     84 tablet    Take 1 tablet by mouth every 6 hours as needed.    COPD (chronic obstructive pulmonary disease) (H)       doxycycline 100 MG capsule    VIBRAMYCIN    20  capsule    Take 1 capsule (100 mg) by mouth 2 times daily    Centrilobular emphysema (H)       Fingertip Pulse Oximeter Misc     1 each    To be used as needed - for SOB.    COPD (chronic obstructive pulmonary disease) (H), Respiratory failure with hypoxia (H)       fluticasone 50 MCG/ACT spray    FLONASE    16 g    Spray 1 spray into both nostrils 2 times daily    Centrilobular emphysema (H)       LORazepam 0.5 MG tablet    ATIVAN    90 tablet    Take 1 tablet (0.5 mg) by mouth every 8 hours as needed for anxiety    Anxiety       montelukast 10 MG tablet    SINGULAIR    30 tablet    Take 1 tablet (10 mg) by mouth every evening    Centrilobular emphysema (H)       MULTIVITAMIN TABS   OR      1 TABLET BY MOUTH DAILY        Nebulizer Compressor Kit     1 kit    1 Device 4 times daily    COPD (chronic obstructive pulmonary disease) (H)       omeprazole 20 MG CR capsule    priLOSEC    180 capsule    TAKE ONE CAPSULE BY MOUTH TWICE A DAY    Gastroesophageal reflux disease with esophagitis       order for DME      O2 at night and prn        order for DME     1 Device    Equipment being ordered: Oxygen --Please provide oxygen at 4 LPM via nasal canula with exertion and with sleep. Can be on room air at rest. Please provide portability. Keep oxygen saturations above 90%.    COPD (chronic obstructive pulmonary disease) (H)       predniSONE 10 MG tablet    DELTASONE    23 tablet    Take 40 mg x 2 days, 30 mg x 2 days, 20 mg x 3 days and 10 mg x 3 days    Chronic obstructive pulmonary disease, unspecified COPD type (H)       SENNA LAXATIVE PO      2 TABLET BY MOUTH DAILY AS NEEDED FOR CONSTIPATION        sertraline 50 MG tablet    ZOLOFT    135 tablet    TAKE ONE AND ONE-HALF TABLET BY MOUTH EVERY DAY    Major depressive disorder, single episode, mild (H)       simvastatin 20 MG tablet    ZOCOR    90 tablet    Take 1 tablet (20 mg) by mouth At Bedtime    Hyperlipidemia LDL goal <130       tiotropium 18 MCG capsule    SPIRIVA  HANDIHALER    90 capsule    Inhale 1 capsule (18 mcg) into the lungs daily    Centrilobular emphysema (H)       VENTOLIN  (90 BASE) MCG/ACT Inhaler   Generic drug:  albuterol     54 g    INHALE 2 PUFFS BY MOUTH INTO THE LUNGS EVERY 4 HOURS AS NEEDED    COPD (chronic obstructive pulmonary disease) (H), Oral thrush       vitamin D 1000 UNITS capsule      Take 1 capsule by mouth 2 times daily.

## 2018-04-06 NOTE — NURSING NOTE
"Chief Complaint   Patient presents with     Heart Problem     Pt c/o rapid heart beat started at 3-4pm     Urgent Care       Initial BP (!) 152/104  Pulse 116  Temp 97.1  F (36.2  C) (Tympanic)  Resp 16  LMP 09/08/2004  SpO2 95% Estimated body mass index is 31.62 kg/(m^2) as calculated from the following:    Height as of 3/26/18: 5' 5\" (1.651 m).    Weight as of 3/26/18: 190 lb (86.2 kg).  Medication Reconciliation: unable or not appropriate to perform    Mehul Jane CMA  "

## 2018-04-06 NOTE — PROGRESS NOTES
Chief Complaint   Patient presents with     Heart Problem     Pt c/o rapid heart beat started at 3-4pm     Urgent Care     SUBJECTIVE:  Mary Kay Deal, a 57 year old female with h/o COPD , lung cancer and asthma on 4 litres of oxygen scheduled an appointment to discuss the following issues:  Rapid heart beat  Started noticing rapid heart beat for last 4 hrs on and off  Denies any chest pain or chest heaviness   Has some chest tightness too discussed about doing NEB pt refused it   Past Medical History:   Diagnosis Date     Aspergillosis, with pneumonia (H)      Cancer (H)     questionable lung CA     Chronic infection     Known fungal lung condition     Constipation      Disorder of bone and cartilage, unspecified      Emphysema      GERD      Hx of radiation therapy     For lung mass     Mild intermittent asthma      Other dyspnea and respiratory abnormality      Other malaise and fatigue      SMOKER       Past Surgical History:   Procedure Laterality Date     BREAST SURGERY      Tumor removal at the age of 15     COLONOSCOPY N/A 2014    Procedure: COLONOSCOPY;  Surgeon: Patricia Perry MD;  Location:  OR     COLONOSCOPY N/A 2015    Procedure: COMBINED COLONOSCOPY, SINGLE OR MULTIPLE BIOPSY/POLYPECTOMY BY BIOPSY;  Surgeon: Patricia Perry MD;  Location:  GI     ENT SURGERY      Tonsillectomy     GYN SURGERY       x 1     SURGICAL HISTORY OF -       s/p Breast tumor @ age 15 - benign     SURGICAL HISTORY OF -       c section     SURGICAL HISTORY OF -       tonsillectomy        Social History     Social History     Marital status:      Spouse name: N/A     Number of children: N/A     Years of education: N/A     Occupational History     Not on file.     Social History Main Topics     Smoking status: Former Smoker     Packs/day: 1.00     Years: 30.00     Types: Cigarettes     Quit date: 10/21/2005     Smokeless tobacco: Never Used     Alcohol use No      Comment: sober since       Drug use: No     Sexual activity: Yes     Partners: Male      Comment: vas     Other Topics Concern     Parent/Sibling W/ Cabg, Mi Or Angioplasty Before 65f 55m? Yes     Social History Narrative    , 3 children    Retired  at [employer]          The patient is currently on disability due to COPD.  She rides a scooter due to her lungs.  She used to work as a .  She and her  are living with their daughter in [place].  They have a Danish Corgi mix.  The patient stopped smoking 9 years ago.  She smoked 1-2 packs of cigarettes per day for 30 years, consistent with 45-60 pack year history.  She denies cigars, pipes or chewing tobacco.  She smoked marijuana in her teens.  The patient is an alcoholic and has been sober for the past 27 years.  Caffeine intake includes 4-5 cups of caffeinated coffee every morning.  She does not drink tea.  She consumes 1 can of Diet Pepsi as late as 7:00 p.m.  She eats chocolate candy bars as late as 7:00 p.m.  She does not exercise.  She has a treadmill, so she could; however, as of this time she is not exercising.                 Current Outpatient Prescriptions   Medication Sig Dispense Refill     tiotropium (SPIRIVA HANDIHALER) 18 MCG capsule Inhale 1 capsule (18 mcg) into the lungs daily 90 capsule 3     montelukast (SINGULAIR) 10 MG tablet Take 1 tablet (10 mg) by mouth every evening 30 tablet 11     arformoterol (BROVANA) 15 MCG/2ML NEBU neb solution Take 2 mLs (15 mcg) by nebulization 2 times daily 120 mL 11     budesonide (PULMICORT) 1 MG/2ML SUSP neb solution Take 2 mLs (1 mg) by nebulization 2 times daily 60 ampule 11     predniSONE (DELTASONE) 10 MG tablet Take 40 mg x 2 days, 30 mg x 2 days, 20 mg x 3 days and 10 mg x 3 days 23 tablet 1     azithromycin (ZITHROMAX) 250 MG tablet Take 1 tablet (250 mg) by mouth daily 30 tablet 11     doxycycline (VIBRAMYCIN) 100 MG capsule Take 1 capsule (100 mg) by mouth 2 times daily 20 capsule 1      fluticasone (FLONASE) 50 MCG/ACT spray Spray 1 spray into both nostrils 2 times daily 16 g 11     simvastatin (ZOCOR) 20 MG tablet Take 1 tablet (20 mg) by mouth At Bedtime 90 tablet 3     sertraline (ZOLOFT) 50 MG tablet TAKE ONE AND ONE-HALF TABLET BY MOUTH EVERY  tablet 3     omeprazole (PRILOSEC) 20 MG CR capsule TAKE ONE CAPSULE BY MOUTH TWICE A  capsule 3     LORazepam (ATIVAN) 0.5 MG tablet Take 1 tablet (0.5 mg) by mouth every 8 hours as needed for anxiety 90 tablet 3     VENTOLIN  (90 BASE) MCG/ACT Inhaler INHALE 2 PUFFS BY MOUTH INTO THE LUNGS EVERY 4 HOURS AS NEEDED 54 g 9     order for DME Equipment being ordered: Oxygen --Please provide oxygen at 4 LPM via nasal canula with exertion and with sleep. Can be on room air at rest. Please provide portability. Keep oxygen saturations above 90%. 1 Device 1     Respiratory Therapy Supplies (NEBULIZER COMPRESSOR) KIT 1 Device 4 times daily 1 kit 1     Misc. Devices (FINGERTIP PULSE OXIMETER) MISC To be used as needed - for SOB. 1 each 0     Chlorpheniramine-DM 4-20 MG TABS Take 1 tablet by mouth every 6 hours as needed. 84 tablet 3     Cholecalciferol (VITAMIN D) 1000 UNIT capsule Take 1 capsule by mouth 2 times daily.       Calcium (CALCIUM 600) 600 MG tablet Take 1 tablet by mouth 2 times daily. 1-2 tablets as tolerated       ORDER FOR DME O2 at night and prn       MULTIVITAMIN TABS   OR 1 TABLET BY MOUTH DAILY       SENNA LAXATIVE OR 2 TABLET BY MOUTH DAILY AS NEEDED FOR CONSTIPATION         Health Maintenance   Topic Date Due     ADVANCE DIRECTIVE PLANNING Q5 YRS  06/04/2014     COPD ACTION PLAN Q1 YR  06/30/2016     DEPRESSION ACTION PLAN Q1 YR  06/30/2016     JERILYN QUESTIONNAIRE 1 YEAR  07/01/2017     PAP SCREENING Q3 YR (SYSTEM ASSIGNED)  06/30/2018     PHQ-9 Q6 MONTHS  07/29/2018     INFLUENZA VACCINE (SYSTEM ASSIGNED)  09/01/2018     COLONOSCOPY Q3 YR  09/04/2018     CMP Q1 YR  01/29/2019     LIPID MONITORING Q1 YEAR  01/29/2019      CBC Q1 YR  01/29/2019     TETANUS IMMUNIZATION (SYSTEM ASSIGNED)  04/15/2019     MAMMO SCREEN Q2 YR (SYSTEM ASSIGNED)  01/29/2020     PNEUMOVAX 1X HI RISK PATIENT < 65 (NO IB MSG)  Completed     SPIROMETRY ONETIME  Completed     HEPATITIS C SCREENING  Completed        ROS:  CONSTITUTIONAL:no fever, no chills or sweats, no excessive fatigue, no significant change in weight  CV: neg   RESP -on oxygen   GI:  Neg   NEURO: neg   MSK - neg   Skin - neg   Pyschiatry-neg     OBJECTIVE:  BP (!) 152/104  Pulse 116  Temp 97.1  F (36.2  C) (Tympanic)  Resp 16  LMP 09/08/2004  SpO2 95%      EXAM:  GENERAL APPEARANCE: healthy, alert and no acute  Distress on oxygen through nasal canula 4 litres   EYES: EOMI,  PERRL  HENT: ear canals and TM's normal and nose and mouth without ulcers or lesions  RESP:poor air entry , decreased breath sounds throughout   CV: regular rates and rhythm, normal S1 S2, no S3 or S4 and no murmur, click or rub -  ABDOMEN:  soft, nontender, no HSM or masses and bowel sounds normal  NEURO: Normal strength and tone, sensory exam grossly normal, mentation intact and speech normal  PSYCH: mentation appears normal and affect normal/bright        ASSESSMENT/PLAN:  Mary Kay was seen today for heart problem and urgent care.    Diagnoses and all orders for this visit:    Rapid heart beat  -     EKG 12-lead complete w/read - Clinics    Chest tightness  -     Discontinue: ipratropium - albuterol 0.5 mg/2.5 mg/3 mL (DUONEB) 0.5-2.5 (3) MG/3ML neb solution; Take 1 vial (3 mLs) by nebulization once for 1 dose    WKG was within normal limits   Reviewed symptoms with pt   She felt much better later   Discussed if notices any worsening chest symptoms should follow up in the ER       Follow up if  symptoms fail to improve or worsens   Pt understood and agreed with plan             Tamy Bertrand MD

## 2018-04-10 LAB
EXPTIME-PRE: 7.85 SEC
FEF2575-%PRED-PRE: 8 %
FEF2575-PRE: 0.2 L/SEC
FEF2575-PRED: 2.38 L/SEC
FEFMAX-%PRED-PRE: 19 %
FEFMAX-PRE: 1.29 L/SEC
FEFMAX-PRED: 6.51 L/SEC
FEV1-%PRED-PRE: 15 %
FEV1-PRE: 0.41 L
FEV1FEV6-PRE: 31 %
FEV1FEV6-PRED: 81 %
FEV1FVC-PRE: 27 %
FEV1FVC-PRED: 78 %
FIFMAX-PRE: 2.46 L/SEC
FVC-%PRED-PRE: 45 %
FVC-PRE: 1.5 L
FVC-PRED: 3.32 L

## 2018-05-07 DIAGNOSIS — J44.9 COPD (CHRONIC OBSTRUCTIVE PULMONARY DISEASE) (H): ICD-10-CM

## 2018-05-07 DIAGNOSIS — B37.0 ORAL THRUSH: ICD-10-CM

## 2018-05-08 RX ORDER — ALBUTEROL SULFATE 90 UG/1
AEROSOL, METERED RESPIRATORY (INHALATION)
Qty: 54 G | Refills: 9 | Status: SHIPPED | OUTPATIENT
Start: 2018-05-08 | End: 2019-10-22

## 2018-07-20 ENCOUNTER — OFFICE VISIT (OUTPATIENT)
Dept: URGENT CARE | Facility: URGENT CARE | Age: 58
End: 2018-07-20
Payer: MEDICARE

## 2018-07-20 VITALS
OXYGEN SATURATION: 95 % | DIASTOLIC BLOOD PRESSURE: 100 MMHG | HEART RATE: 108 BPM | SYSTOLIC BLOOD PRESSURE: 140 MMHG | TEMPERATURE: 97.3 F

## 2018-07-20 DIAGNOSIS — R30.0 DYSURIA: Primary | ICD-10-CM

## 2018-07-20 DIAGNOSIS — R53.83 FATIGUE, UNSPECIFIED TYPE: ICD-10-CM

## 2018-07-20 LAB
ALBUMIN UR-MCNC: NEGATIVE MG/DL
APPEARANCE UR: CLEAR
BILIRUB UR QL STRIP: NEGATIVE
COLOR UR AUTO: YELLOW
GLUCOSE UR STRIP-MCNC: NEGATIVE MG/DL
HGB UR QL STRIP: ABNORMAL
KETONES UR STRIP-MCNC: NEGATIVE MG/DL
LEUKOCYTE ESTERASE UR QL STRIP: ABNORMAL
NITRATE UR QL: NEGATIVE
PH UR STRIP: 6 PH (ref 5–7)
RBC #/AREA URNS AUTO: NORMAL /HPF
SOURCE: ABNORMAL
SP GR UR STRIP: 1.01 (ref 1–1.03)
UROBILINOGEN UR STRIP-ACNC: 0.2 EU/DL (ref 0.2–1)
WBC #/AREA URNS AUTO: NORMAL /HPF

## 2018-07-20 PROCEDURE — 99213 OFFICE O/P EST LOW 20 MIN: CPT | Performed by: NURSE PRACTITIONER

## 2018-07-20 PROCEDURE — 81001 URINALYSIS AUTO W/SCOPE: CPT | Performed by: NURSE PRACTITIONER

## 2018-07-20 PROCEDURE — 36415 COLL VENOUS BLD VENIPUNCTURE: CPT | Performed by: NURSE PRACTITIONER

## 2018-07-20 PROCEDURE — 84443 ASSAY THYROID STIM HORMONE: CPT | Performed by: NURSE PRACTITIONER

## 2018-07-20 NOTE — MR AVS SNAPSHOT
After Visit Summary   7/20/2018    Mary Kay Deal    MRN: [id number]           Patient Information     Date Of Birth          1960        Visit Information        Provider Department      7/20/2018 6:55 PM Erik Olvera APRN CNP [facility]        Today's Diagnoses     Dysuria    -  1    Fatigue, unspecified type           Follow-ups after your visit        Your next 10 appointments already scheduled     Sep 17, 2018 10:30 AM CDT   CT CHEST W/O CONTRAST with SHCT1   [facility] CT ([facility])    [address] [facility] [id number] [phone]           Please bring any scans or X-rays taken at other hospitals, if similar tests were done. Also bring a list of your medicines, including vitamins, minerals and over-the-counter drugs. It is safest to leave personal items at home.  Be sure to tell your doctor:   If you have any allergies.   If there s any chance you are pregnant.   If you are breastfeeding.  You do not need to do anything special to prepare for this exam.  Please wear loose clothing, such as a sweat suit or jogging clothes. Avoid snaps, zippers and other metal. We may ask you to undress and put on a hospital gown.            Sep 19, 2018 10:30 AM CDT   Return Visit with Merrill Nevarez MD   Radiation Oncology Clinic ([facility])    [facility]  1st Floor  [address] [id number] [phone]            Oct 01, 2018 11:00 AM CDT   PFT VISIT with  PFL D   [facility] Pulmonary Function Testing ([facility])    [address]  3rd Floor  [facility] [id number] [phone]            Oct 01, 2018 11:20 AM CDT   (Arrive by 11:05 AM)   Return Visit with Keith Mason MD   [facility] ([facility])    [address]  [address]  [place] [id number] [phone]               Who to contact     If you have questions or need follow up information about today's clinic visit or your schedule please contact [facility] directly at [phone].  Normal or non-critical lab and imaging results will be communicated to you by MyChart, letter or phone within 4 business days after the clinic has received the results. If you do not hear from us within 7 days, please contact the clinic through MyChart or phone. If you have a critical or abnormal lab result, we will notify you by phone as soon as possible.  Submit refill requests through [employer] or call your pharmacy and they will forward the refill request to us. Please allow 3 business days for your refill to be completed.          Additional Information About Your Visit        [employer] Information     [employer] gives you secure access to your electronic health record. If you see a primary care provider, you can also send messages to your care team and make appointments. If you have questions, please call your primary care clinic.  If you do not have a primary care provider, please call [phone] and they will assist you.        Care EveryWhere ID     This is your Care EveryWhere ID. This could be used by other organizations to access your [place] medical records  [id number]        Your Vitals Were     Pulse Temperature Last Period Pulse Oximetry          108 97.3  F (36.3  C) (Oral) 09/08/2004 95%         Blood Pressure from Last 3 Encounters:   07/20/18 (!) 140/100   04/05/18 (!) 158/94   03/26/18 133/90    Weight from Last 3 Encounters:   03/26/18 190 lb (86.2 kg)   01/29/18 190 lb (86.2 kg)   10/18/17 190 lb (86.2 kg)              We Performed the Following     *UA reflex to Microscopic and Culture ([place] [facility] (except [place] and [place])     TSH with free T4 reflex     Urine Microscopic        Primary Care Provider Office Phone # Fax #    Doug Godoy [phone]  [phone]       [address] [facility] [[email or web address] [address]  [facility] [address]        Equal Access to Services     NIDHI PARKINSON : Hadii aad ku hadherlindao Moe, wamaryannda luqadaha, qaybta kaalmada ken, jarvis angeliin hayaaaugusto cuevakylah martinez lajorgeaugusto cross. So [facility] [phone].    ATENCIÓN: Si habla español, tiene a rene disposición servicios gratuitos de asistencia lingüística. Llame al [phone].    We comply with applicable federal civil rights laws and [place] laws. We do not discriminate on the basis of race, color, national origin, age, disability, sex, sexual orientation, or gender identity.            Thank you!     Thank you for choosing [facility]  for your care. Our goal is always to provide you with excellent care. Hearing back from our patients is one way we can continue to improve our services. Please take a few minutes to complete the written survey that you may receive in the mail after your visit with us. Thank you!             Your Updated Medication List - Protect others around you: Learn how to safely use, store and throw away your medicines at www.disposemymeds.org.          This list is accurate as of 7/20/18  8:55 PM.  Always use your most recent med list.                   Brand Name Dispense Instructions for use Diagnosis    arformoterol 15 MCG/2ML Nebu neb solution    BROVANA    120 mL    Take 2 mLs (15 mcg) by nebulization 2 times daily    Centrilobular emphysema (H)       azithromycin 250 MG tablet    ZITHROMAX    30 tablet    Take 1 tablet (250 mg) by mouth daily    Centrilobular emphysema (H)       budesonide 1 MG/2ML Susp neb solution    PULMICORT    60 ampule    Take 2 mLs (1 mg) by nebulization 2 times daily    Centrilobular emphysema (H)       calcium carbonate 600 MG tablet   Generic drug:  calcium      Take 1 tablet by mouth 2 times daily. 1-2 tablets as tolerated        Chlorpheniramine-DM 4-20 MG Tabs     84 tablet    Take 1 tablet by mouth every 6 hours as needed.    COPD  (chronic obstructive pulmonary disease) (H)       doxycycline 100 MG capsule    VIBRAMYCIN    20 capsule    Take 1 capsule (100 mg) by mouth 2 times daily    Centrilobular emphysema (H)       Fingertip Pulse Oximeter Misc     1 each    To be used as needed - for SOB.    COPD (chronic obstructive pulmonary disease) (H), Respiratory failure with hypoxia (H)       fluticasone 50 MCG/ACT spray    FLONASE    16 g    Spray 1 spray into both nostrils 2 times daily    Centrilobular emphysema (H)       LORazepam 0.5 MG tablet    ATIVAN    90 tablet    Take 1 tablet (0.5 mg) by mouth every 8 hours as needed for anxiety    Anxiety       montelukast 10 MG tablet    SINGULAIR    30 tablet    Take 1 tablet (10 mg) by mouth every evening    Centrilobular emphysema (H)       MULTIVITAMIN TABS   OR      1 TABLET BY MOUTH DAILY        Nebulizer Compressor Kit     1 kit    1 Device 4 times daily    COPD (chronic obstructive pulmonary disease) (H)       omeprazole 20 MG CR capsule    priLOSEC    180 capsule    TAKE ONE CAPSULE BY MOUTH TWICE A DAY    Gastroesophageal reflux disease with esophagitis       order for DME      O2 at night and prn        order for DME     1 Device    Equipment being ordered: Oxygen --Please provide oxygen at 4 LPM via nasal canula with exertion and with sleep. Can be on room air at rest. Please provide portability. Keep oxygen saturations above 90%.    COPD (chronic obstructive pulmonary disease) (H)       predniSONE 10 MG tablet    DELTASONE    23 tablet    Take 40 mg x 2 days, 30 mg x 2 days, 20 mg x 3 days and 10 mg x 3 days    Chronic obstructive pulmonary disease, unspecified COPD type (H)       SENNA LAXATIVE PO      2 TABLET BY MOUTH DAILY AS NEEDED FOR CONSTIPATION        sertraline 50 MG tablet    ZOLOFT    135 tablet    TAKE ONE AND ONE-HALF TABLET BY MOUTH EVERY DAY    Major depressive disorder, single episode, mild (H)       simvastatin 20 MG tablet    ZOCOR    90 tablet    Take 1 tablet (20 mg)  by mouth At Bedtime    Hyperlipidemia LDL goal <130       tiotropium 18 MCG capsule    SPIRIVA HANDIHALER    90 capsule    Inhale 1 capsule (18 mcg) into the lungs daily    Centrilobular emphysema (H)       VENTOLIN  (90 Base) MCG/ACT Inhaler   Generic drug:  albuterol     54 g    INHALE 2 PUFFS BY MOUTH INTO THE LUNGS EVERY 4 HOURS AS NEEDED    COPD (chronic obstructive pulmonary disease) (H), Oral thrush       vitamin D 1000 units capsule      Take 1 capsule by mouth 2 times daily.

## 2018-07-21 LAB — TSH SERPL DL<=0.005 MIU/L-ACNC: 3.52 MU/L (ref 0.4–4)

## 2018-07-21 NOTE — PROGRESS NOTES
"HPI    Chief Complaint   Patient presents with     Urgent Care     UTI     lower back,abdominal pain,frequency,feeling more tierd       More tired recently  Low back pain and low abd pain   Low abd pain lasted about 30 minutes  Back pain is mild    Chronic throat issues. Occasionally will feel like something gets stuck in the throat   Hot flashes. Hasn't checked temps   HAs which is new but neck has been out lately too. Her neck \"goes out\" every once in awhile    Chronic constipation but not significant right now    Has daytime drowsiness.Doesn't sleep well chronically  No cough  SOB not worse than normal  Has heartburn with certain foods        Past Medical History:   Diagnosis Date     Aspergillosis, with pneumonia (H)      Cancer (H)     questionable lung CA     Chronic infection     Known fungal lung condition     Constipation      Disorder of bone and cartilage, unspecified      Emphysema      GERD      Hx of radiation therapy     For lung mass     Mild intermittent asthma      Other dyspnea and respiratory abnormality      Other malaise and fatigue      SMOKER      Family History   Problem Relation Age of Onset     Alcohol/Drug Mother      Neurologic Disorder Mother      dec 63 yo     C.A.D. Father 49      of MI     Respiratory Father      COPD     Obesity Daughter      Hypertension Daughter      Respiratory Sister      dec 42 yo - pulm dis     Alcohol/Drug Sister      dec 39 yo - anorexia     Cancer Brother      Eye Cancer     Thyroid Disease Maternal Grandmother      CA     Past Surgical History:   Procedure Laterality Date     BREAST SURGERY      Tumor removal at the age of 15     COLONOSCOPY N/A 2014    Procedure: COLONOSCOPY;  Surgeon: Patricia Perry MD;  Location:  OR     COLONOSCOPY N/A 2015    Procedure: COMBINED COLONOSCOPY, SINGLE OR MULTIPLE BIOPSY/POLYPECTOMY BY BIOPSY;  Surgeon: Patricia Perry MD;  Location:  GI     ENT SURGERY      Tonsillectomy     GYN SURGERY      "  x 1     SURGICAL HISTORY OF -       s/p Breast tumor @ age 15 - benign     SURGICAL HISTORY OF -       c section     SURGICAL HISTORY OF -       tonsillectomy     Social History   Substance Use Topics     Smoking status: Former Smoker     Packs/day: 1.00     Years: 30.00     Types: Cigarettes     Quit date: 10/21/2005     Smokeless tobacco: Never Used     Alcohol use No      Comment: sober since      Current Outpatient Prescriptions   Medication Sig Dispense Refill     arformoterol (BROVANA) 15 MCG/2ML NEBU neb solution Take 2 mLs (15 mcg) by nebulization 2 times daily 120 mL 11     azithromycin (ZITHROMAX) 250 MG tablet Take 1 tablet (250 mg) by mouth daily 30 tablet 11     budesonide (PULMICORT) 1 MG/2ML SUSP neb solution Take 2 mLs (1 mg) by nebulization 2 times daily 60 ampule 11     Calcium (CALCIUM 600) 600 MG tablet Take 1 tablet by mouth 2 times daily. 1-2 tablets as tolerated       Chlorpheniramine-DM 4-20 MG TABS Take 1 tablet by mouth every 6 hours as needed. 84 tablet 3     Cholecalciferol (VITAMIN D) 1000 UNIT capsule Take 1 capsule by mouth 2 times daily.       doxycycline (VIBRAMYCIN) 100 MG capsule Take 1 capsule (100 mg) by mouth 2 times daily 20 capsule 1     fluticasone (FLONASE) 50 MCG/ACT spray Spray 1 spray into both nostrils 2 times daily 16 g 11     LORazepam (ATIVAN) 0.5 MG tablet Take 1 tablet (0.5 mg) by mouth every 8 hours as needed for anxiety 90 tablet 3     Misc. Devices (FINGERTIP PULSE OXIMETER) MISC To be used as needed - for SOB. 1 each 0     montelukast (SINGULAIR) 10 MG tablet Take 1 tablet (10 mg) by mouth every evening 30 tablet 11     MULTIVITAMIN TABS   OR 1 TABLET BY MOUTH DAILY       omeprazole (PRILOSEC) 20 MG CR capsule TAKE ONE CAPSULE BY MOUTH TWICE A  capsule 3     order for DME Equipment being ordered: Oxygen --Please provide oxygen at 4 LPM via nasal canula with exertion and with sleep. Can be on room air at rest. Please provide portability. Keep  oxygen saturations above 90%. 1 Device 1     ORDER FOR DME O2 at night and prn       predniSONE (DELTASONE) 10 MG tablet Take 40 mg x 2 days, 30 mg x 2 days, 20 mg x 3 days and 10 mg x 3 days 23 tablet 1     Respiratory Therapy Supplies (NEBULIZER COMPRESSOR) KIT 1 Device 4 times daily 1 kit 1     SENNA LAXATIVE OR 2 TABLET BY MOUTH DAILY AS NEEDED FOR CONSTIPATION       sertraline (ZOLOFT) 50 MG tablet TAKE ONE AND ONE-HALF TABLET BY MOUTH EVERY  tablet 3     simvastatin (ZOCOR) 20 MG tablet Take 1 tablet (20 mg) by mouth At Bedtime 90 tablet 3     tiotropium (SPIRIVA HANDIHALER) 18 MCG capsule Inhale 1 capsule (18 mcg) into the lungs daily 90 capsule 3     VENTOLIN  (90 Base) MCG/ACT Inhaler INHALE 2 PUFFS BY MOUTH INTO THE LUNGS EVERY 4 HOURS AS NEEDED 54 g 9     Allergies   Allergen Reactions     Penicillins Hives       Reviewed and updated as needed this visit by clinical staff and provider     ROS  Detailed as above       BP (!) 140/100  Pulse 108  Temp 97.3  F (36.3  C) (Oral)  LMP 09/08/2004  SpO2 95%      Physical Exam   Constitutional: She is well-developed, well-nourished, and in no distress.   HENT:   Head: Normocephalic.   Eyes: Conjunctivae are normal.   Cardiovascular: Normal rate, regular rhythm and normal heart sounds.    Pulmonary/Chest: Effort normal.   Using NC   Neurological: She is alert.   Skin: Skin is warm and dry.   Psychiatric: Mood and affect normal.   Vitals reviewed.      Assessment and Plan:       ICD-10-CM    1. Dysuria R30.0 *UA reflex to Microscopic and Culture ([place] [facility] (except [place] and [place])     Urine Microscopic   2. Fatigue, unspecified type R53.83 TSH with free T4 reflex       Neg UA today   Will check TSH as she is due for recheck   She only had 30 minutes of abdominal pain that has been resolved for many hours now so do not feel we need to emergent workup.  Doubt emergent etiology as she looks well today   At this point we will  watch and wait   F/u with pcp if symptoms persist       Erik Olvera APRN, CNP  [facility]

## 2018-08-20 ENCOUNTER — OFFICE VISIT (OUTPATIENT)
Dept: URGENT CARE | Facility: URGENT CARE | Age: 58
End: 2018-08-20
Payer: MEDICARE

## 2018-08-20 VITALS
OXYGEN SATURATION: 98 % | DIASTOLIC BLOOD PRESSURE: 88 MMHG | TEMPERATURE: 98.6 F | SYSTOLIC BLOOD PRESSURE: 136 MMHG | HEART RATE: 112 BPM

## 2018-08-20 DIAGNOSIS — M25.512 ACUTE PAIN OF LEFT SHOULDER: Primary | ICD-10-CM

## 2018-08-20 PROCEDURE — 99214 OFFICE O/P EST MOD 30 MIN: CPT | Performed by: FAMILY MEDICINE

## 2018-08-20 NOTE — MR AVS SNAPSHOT
After Visit Summary   8/20/2018    Mary Kay Deal    MRN: [id number]           Patient Information     Date Of Birth          1960        Visit Information        Provider Department      8/20/2018 6:25 PM Reggie Sandoval MD [facility]        Today's Diagnoses     Acute pain of left shoulder    -  1       Follow-ups after your visit        Your next 10 appointments already scheduled     Sep 17, 2018 10:30 AM CDT   CT CHEST W/O CONTRAST with SHCT1   [facility] CT ([facility])    [address] [place] [id number] [phone]           Please bring any scans or X-rays taken at other hospitals, if similar tests were done. Also bring a list of your medicines, including vitamins, minerals and over-the-counter drugs. It is safest to leave personal items at home.  Be sure to tell your doctor:   If you have any allergies.   If there s any chance you are pregnant.   If you are breastfeeding.  You do not need to do anything special to prepare for this exam.  Please wear loose clothing, such as a sweat suit or jogging clothes. Avoid snaps, zippers and other metal. We may ask you to undress and put on a hospital gown.            Sep 19, 2018 10:30 AM CDT   Return Visit with Merrill Nevarez MD   [facility] ([facility])    [facility]  1st Floor  [address] [id number] [phone]            Oct 01, 2018 11:00 AM CDT   PFT VISIT with  PFL D   [facility] Pulmonary Function Testing ([facility])    [address]  3rd Floor  [facility] [id number] [phone]            Oct 01, 2018 11:20 AM CDT   (Arrive by 11:05 AM)   Return Visit with Keith Mason MD   [facility] ([facility])    [address]  [address]  [facility] [id number] [phone]              Who to  contact     If you have questions or need follow up information about today's clinic visit or your schedule please contact [facility] directly at [phone].  Normal or non-critical lab and imaging results will be communicated to you by [employer], letter or phone within 4 business days after the clinic has received the results. If you do not hear from us within 7 days, please contact the clinic through [employer] or phone. If you have a critical or abnormal lab result, we will notify you by phone as soon as possible.  Submit refill requests through [employer] or call your pharmacy and they will forward the refill request to us. Please allow 3 business days for your refill to be completed.          Additional Information About Your Visit        [employer] Information     [employer] gives you secure access to your electronic health record. If you see a primary care provider, you can also send messages to your care team and make appointments. If you have questions, please call your primary care clinic.  If you do not have a primary care provider, please call [phone] and they will assist you.        Care EveryWhere ID     This is your Care EveryWhere ID. This could be used by other organizations to access your [facility] medical records  [id number]        Your Vitals Were     Pulse Temperature Last Period Pulse Oximetry          112 98.6  F (37  C) (Tympanic) 09/08/2004 98%         Blood Pressure from Last 3 Encounters:   08/20/18 136/88   07/20/18 (!) 140/100   04/05/18 (!) 158/94    Weight from Last 3 Encounters:   03/26/18 190 lb (86.2 kg)   01/29/18 190 lb (86.2 kg)   10/18/17 190 lb (86.2 kg)              Today, you had the following     No orders found for display         Today's Medication Changes          These changes are accurate as of 8/20/18  7:37 PM.  If you have any questions, ask your nurse or doctor.               Start taking these medicines.        Dose/Directions     acetaminophen-codeine 300-30 MG per tablet   Commonly known as:  TYLENOL #3   Used for:  Acute pain of left shoulder   Started by:  Reggie Sandoval MD        Dose:  1 tablet   Take 1 tablet by mouth every 6 hours as needed for severe pain   Quantity:  15 tablet   Refills:  0            Where to get your medicines      Some of these will need a paper prescription and others can be bought over the counter.  Ask your nurse if you have questions.     Bring a paper prescription for each of these medications     acetaminophen-codeine 300-30 MG per tablet               Information about OPIOIDS     PRESCRIPTION OPIOIDS: WHAT YOU NEED TO KNOW   We gave you an opioid (narcotic) pain medicine. It is important to manage your pain, but opioids are not always the best choice. You should first try all the other options your care team gave you. Take this medicine for as short a time (and as few doses) as possible.    Some activities can increase your pain, such as bandage changes or therapy sessions. It may help to take your pain medicine 30 to 60 minutes before these activities. Reduce your stress by getting enough sleep, working on hobbies you enjoy and practicing relaxation or meditation. Talk to your care team about ways to manage your pain beyond prescription opioids.    These medicines have risks:    DO NOT drive when on new or higher doses of pain medicine. These medicines can affect your alertness and reaction times, and you could be arrested for driving under the influence (DUI). If you need to use opioids long-term, talk to your care team about driving.    DO NOT operate heavy machinery    DO NOT do any other dangerous activities while taking these medicines.    DO NOT drink any alcohol while taking these medicines.     If the opioid prescribed includes acetaminophen, DO NOT take with any other medicines that contain acetaminophen. Read all labels carefully. Look for the word  acetaminophen  or  Tylenol.  Ask your  pharmacist if you have questions or are unsure.    You can get addicted to pain medicines, especially if you have a history of addiction (chemical, alcohol or substance dependence). Talk to your care team about ways to reduce this risk.    All opioids tend to cause constipation. Drink plenty of water and eat foods that have a lot of fiber, such as fruits, vegetables, prune juice, apple juice and high-fiber cereal. Take a laxative (Miralax, milk of magnesia, Colace, Senna) if you don t move your bowels at least every other day. Other side effects include upset stomach, sleepiness, dizziness, throwing up, tolerance (needing more of the medicine to have the same effect), physical dependence and slowed breathing.    Store your pills in a secure place, locked if possible. We will not replace any lost or stolen medicine. If you don t finish your medicine, please throw away (dispose) as directed by your pharmacist. The [place] Pollution Control Agency has more information about safe disposal: [email or web address]         Primary Care Provider Office Phone # Fax #    Doug Godoy [phone] [address] [address]        Equal Access to Services     NIDHI PARKINSON : Hadii lashae lopezo Sorl, waaxda luqadaha, qaybta kaalmada adeegyada, jarvis cross. So [facility] [phone].    ATENCIÓN: Si habla español, tiene a rene disposición servicios gratuitos de asistencia lingüística. Llame al [phone].    We comply with applicable federal civil rights laws and [place] laws. We do not discriminate on the basis of race, color, national origin, age, disability, sex, sexual orientation, or gender identity.            Thank you!     Thank you for choosing [facility]  for your care. Our goal is always to provide you with excellent care. Hearing back from our patients is one way we  can continue to improve our services. Please take a few minutes to complete the written survey that you may receive in the mail after your visit with us. Thank you!             Your Updated Medication List - Protect others around you: Learn how to safely use, store and throw away your medicines at www.disposemymeds.org.          This list is accurate as of 8/20/18  7:37 PM.  Always use your most recent med list.                   Brand Name Dispense Instructions for use Diagnosis    acetaminophen-codeine 300-30 MG per tablet    TYLENOL #3    15 tablet    Take 1 tablet by mouth every 6 hours as needed for severe pain    Acute pain of left shoulder       arformoterol 15 MCG/2ML Nebu neb solution    BROVANA    120 mL    Take 2 mLs (15 mcg) by nebulization 2 times daily    Centrilobular emphysema (H)       azithromycin 250 MG tablet    ZITHROMAX    30 tablet    Take 1 tablet (250 mg) by mouth daily    Centrilobular emphysema (H)       budesonide 1 MG/2ML Susp neb solution    PULMICORT    60 ampule    Take 2 mLs (1 mg) by nebulization 2 times daily    Centrilobular emphysema (H)       calcium carbonate 600 MG tablet   Generic drug:  calcium      Take 1 tablet by mouth 2 times daily. 1-2 tablets as tolerated        Chlorpheniramine-DM 4-20 MG Tabs     84 tablet    Take 1 tablet by mouth every 6 hours as needed.    COPD (chronic obstructive pulmonary disease) (H)       doxycycline 100 MG capsule    VIBRAMYCIN    20 capsule    Take 1 capsule (100 mg) by mouth 2 times daily    Centrilobular emphysema (H)       Fingertip Pulse Oximeter Misc     1 each    To be used as needed - for SOB.    COPD (chronic obstructive pulmonary disease) (H), Respiratory failure with hypoxia (H)       fluticasone 50 MCG/ACT spray    FLONASE    16 g    Spray 1 spray into both nostrils 2 times daily    Centrilobular emphysema (H)       LORazepam 0.5 MG tablet    ATIVAN    90 tablet    Take 1 tablet (0.5 mg) by mouth every 8 hours as needed for  anxiety    Anxiety       montelukast 10 MG tablet    SINGULAIR    30 tablet    Take 1 tablet (10 mg) by mouth every evening    Centrilobular emphysema (H)       MULTIVITAMIN TABS   OR      1 TABLET BY MOUTH DAILY        Nebulizer Compressor Kit     1 kit    1 Device 4 times daily    COPD (chronic obstructive pulmonary disease) (H)       omeprazole 20 MG CR capsule    priLOSEC    180 capsule    TAKE ONE CAPSULE BY MOUTH TWICE A DAY    Gastroesophageal reflux disease with esophagitis       order for DME      O2 at night and prn        order for DME     1 Device    Equipment being ordered: Oxygen --Please provide oxygen at 4 LPM via nasal canula with exertion and with sleep. Can be on room air at rest. Please provide portability. Keep oxygen saturations above 90%.    COPD (chronic obstructive pulmonary disease) (H)       predniSONE 10 MG tablet    DELTASONE    23 tablet    Take 40 mg x 2 days, 30 mg x 2 days, 20 mg x 3 days and 10 mg x 3 days    Chronic obstructive pulmonary disease, unspecified COPD type (H)       SENNA LAXATIVE PO      2 TABLET BY MOUTH DAILY AS NEEDED FOR CONSTIPATION        sertraline 50 MG tablet    ZOLOFT    135 tablet    TAKE ONE AND ONE-HALF TABLET BY MOUTH EVERY DAY    Major depressive disorder, single episode, mild (H)       simvastatin 20 MG tablet    ZOCOR    90 tablet    Take 1 tablet (20 mg) by mouth At Bedtime    Hyperlipidemia LDL goal <130       tiotropium 18 MCG capsule    SPIRIVA HANDIHALER    90 capsule    Inhale 1 capsule (18 mcg) into the lungs daily    Centrilobular emphysema (H)       VENTOLIN  (90 Base) MCG/ACT inhaler   Generic drug:  albuterol     54 g    INHALE 2 PUFFS BY MOUTH INTO THE LUNGS EVERY 4 HOURS AS NEEDED    COPD (chronic obstructive pulmonary disease) (H), Oral thrush       vitamin D 1000 units capsule      Take 1 capsule by mouth 2 times daily.

## 2018-08-20 NOTE — PROGRESS NOTES
SUBJECTIVE:  Chief Complaint   Patient presents with     Urgent Care     Musculoskeletal Problem     left shoulder. Pt coughed and felt pain through her arm and pain went away in five mins and then came back. This happened couple weeks ago.      Mary Kay Deal is a 58 year old female who presents with a chief complaint of left shoulder pain.  Symptoms began 2 week(s) ago, are moderate and sudden onset and still present  Context:  Injury:Yes: as above.  Pain exacerbated by flexion/extension Relieved by rest.  She treated it initially with Tylenol. This is not the first time this type of injury has occurred to this patient.     Denies chest pain or worsening sob.  Had normal echo this calendar year  Hx of lung ca    Past Medical History:   Diagnosis Date     Aspergillosis, with pneumonia (H)      Cancer (H)     questionable lung CA     Chronic infection     Known fungal lung condition     Constipation      Disorder of bone and cartilage, unspecified      Emphysema      GERD      Hx of radiation therapy 2013    For lung mass     Mild intermittent asthma      Other dyspnea and respiratory abnormality      Other malaise and fatigue      SMOKER      Current Outpatient Prescriptions   Medication Sig Dispense Refill     arformoterol (BROVANA) 15 MCG/2ML NEBU neb solution Take 2 mLs (15 mcg) by nebulization 2 times daily 120 mL 11     azithromycin (ZITHROMAX) 250 MG tablet Take 1 tablet (250 mg) by mouth daily 30 tablet 11     Calcium (CALCIUM 600) 600 MG tablet Take 1 tablet by mouth 2 times daily. 1-2 tablets as tolerated       Chlorpheniramine-DM 4-20 MG TABS Take 1 tablet by mouth every 6 hours as needed. 84 tablet 3     Cholecalciferol (VITAMIN D) 1000 UNIT capsule Take 1 capsule by mouth 2 times daily.       doxycycline (VIBRAMYCIN) 100 MG capsule Take 1 capsule (100 mg) by mouth 2 times daily 20 capsule 1     fluticasone (FLONASE) 50 MCG/ACT spray Spray 1 spray into both nostrils 2 times daily 16 g 11     LORazepam  (ATIVAN) 0.5 MG tablet Take 1 tablet (0.5 mg) by mouth every 8 hours as needed for anxiety 90 tablet 3     Misc. Devices (FINGERTIP PULSE OXIMETER) MISC To be used as needed - for SOB. 1 each 0     montelukast (SINGULAIR) 10 MG tablet Take 1 tablet (10 mg) by mouth every evening 30 tablet 11     MULTIVITAMIN TABS   OR 1 TABLET BY MOUTH DAILY       omeprazole (PRILOSEC) 20 MG CR capsule TAKE ONE CAPSULE BY MOUTH TWICE A  capsule 3     order for DME Equipment being ordered: Oxygen --Please provide oxygen at 4 LPM via nasal canula with exertion and with sleep. Can be on room air at rest. Please provide portability. Keep oxygen saturations above 90%. 1 Device 1     ORDER FOR DME O2 at night and prn       predniSONE (DELTASONE) 10 MG tablet Take 40 mg x 2 days, 30 mg x 2 days, 20 mg x 3 days and 10 mg x 3 days 23 tablet 1     Respiratory Therapy Supplies (NEBULIZER COMPRESSOR) KIT 1 Device 4 times daily 1 kit 1     SENNA LAXATIVE OR 2 TABLET BY MOUTH DAILY AS NEEDED FOR CONSTIPATION       sertraline (ZOLOFT) 50 MG tablet TAKE ONE AND ONE-HALF TABLET BY MOUTH EVERY  tablet 3     simvastatin (ZOCOR) 20 MG tablet Take 1 tablet (20 mg) by mouth At Bedtime 90 tablet 3     tiotropium (SPIRIVA HANDIHALER) 18 MCG capsule Inhale 1 capsule (18 mcg) into the lungs daily 90 capsule 3     VENTOLIN  (90 Base) MCG/ACT Inhaler INHALE 2 PUFFS BY MOUTH INTO THE LUNGS EVERY 4 HOURS AS NEEDED 54 g 9     budesonide (PULMICORT) 1 MG/2ML SUSP neb solution Take 2 mLs (1 mg) by nebulization 2 times daily 60 ampule 11     Social History   Substance Use Topics     Smoking status: Former Smoker     Packs/day: 1.00     Years: 30.00     Types: Cigarettes     Quit date: 10/21/2005     Smokeless tobacco: Never Used     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     ROS:  Review of systems negative except as stated above.    EXAM:   /88  Pulse 112  Temp 98.6  F (37  C) (Tympanic)  LMP 09/08/2004  SpO2 98%  M/S Exam:L  shoulder FROM   Pain with direct pressure on deltoid   RC seems intact  No impingment  GENERAL APPEARANCE: healthy, alert and no distress  EXTREMITIES: peripheral pulses normal  SKIN: no suspicious lesions or rashes  NEURO: Normal strength and tone, sensory exam grossly normal, mentation intact and speech normal    X-RAY was not done.    ASSESSMENT:  1. Acute pain of left shoulder  Reassure   Has had numerous CTs in follow up for lung ca and is due shortly   Will cover for pain and have see pcp this week in follow-up      To ed over next day or two with increased pain, sob, etc  - acetaminophen-codeine (TYLENOL #3) 300-30 MG per tablet; Take 1 tablet by mouth every 6 hours as needed for severe pain  Dispense: 15 tablet; Refill: 0

## 2018-09-02 ENCOUNTER — HEALTH MAINTENANCE LETTER (OUTPATIENT)
Age: 58
End: 2018-09-02

## 2018-09-07 ENCOUNTER — OFFICE VISIT (OUTPATIENT)
Dept: FAMILY MEDICINE | Facility: CLINIC | Age: 58
End: 2018-09-07
Payer: MEDICARE

## 2018-09-07 VITALS
TEMPERATURE: 97.5 F | HEIGHT: 65 IN | OXYGEN SATURATION: 97 % | SYSTOLIC BLOOD PRESSURE: 146 MMHG | HEART RATE: 97 BPM | WEIGHT: 190 LBS | BODY MASS INDEX: 31.65 KG/M2 | DIASTOLIC BLOOD PRESSURE: 89 MMHG

## 2018-09-07 DIAGNOSIS — J44.9 CHRONIC OBSTRUCTIVE PULMONARY DISEASE, UNSPECIFIED COPD TYPE (H): Primary | ICD-10-CM

## 2018-09-07 DIAGNOSIS — M75.42 IMPINGEMENT SYNDROME, SHOULDER, LEFT: ICD-10-CM

## 2018-09-07 DIAGNOSIS — D12.6 BENIGN NEOPLASM OF COLON, UNSPECIFIED PART OF COLON: ICD-10-CM

## 2018-09-07 DIAGNOSIS — L98.9 SKIN EROSION: ICD-10-CM

## 2018-09-07 PROCEDURE — 99214 OFFICE O/P EST MOD 30 MIN: CPT | Performed by: INTERNAL MEDICINE

## 2018-09-07 RX ORDER — NYSTATIN AND TRIAMCINOLONE ACETONIDE 100000; 1 [USP'U]/G; MG/G
CREAM TOPICAL
Qty: 45 G | Refills: 1 | Status: SHIPPED | OUTPATIENT
Start: 2018-09-07 | End: 2019-03-01

## 2018-09-07 ASSESSMENT — ANXIETY QUESTIONNAIRES
1. FEELING NERVOUS, ANXIOUS, OR ON EDGE: SEVERAL DAYS
2. NOT BEING ABLE TO STOP OR CONTROL WORRYING: SEVERAL DAYS
5. BEING SO RESTLESS THAT IT IS HARD TO SIT STILL: NOT AT ALL
7. FEELING AFRAID AS IF SOMETHING AWFUL MIGHT HAPPEN: NOT AT ALL
GAD7 TOTAL SCORE: 4
3. WORRYING TOO MUCH ABOUT DIFFERENT THINGS: SEVERAL DAYS
6. BECOMING EASILY ANNOYED OR IRRITABLE: NOT AT ALL
IF YOU CHECKED OFF ANY PROBLEMS ON THIS QUESTIONNAIRE, HOW DIFFICULT HAVE THESE PROBLEMS MADE IT FOR YOU TO DO YOUR WORK, TAKE CARE OF THINGS AT HOME, OR GET ALONG WITH OTHER PEOPLE: SOMEWHAT DIFFICULT

## 2018-09-07 ASSESSMENT — PATIENT HEALTH QUESTIONNAIRE - PHQ9: 5. POOR APPETITE OR OVEREATING: SEVERAL DAYS

## 2018-09-07 NOTE — PROGRESS NOTES
"  SUBJECTIVE:   Mary Kay Deal is a 58 year old female who presents to clinic today for the following health issues:      UC Followup:    Facility:  [facility]  Date of visit: 08/20/2018  Reason for visit: Acute pain of left shoulder   Current Status: Patient state that she is doing little better       [facility]  CLINIC PROGRESS NOTE    Subjective:  Chronic obstructive pulmonary disease, unspecified COPD type (H)    Mary Kay Deal has been doing well with regards to breathing and using oxygen by nasal canula, but noticed \"clicking' at the sterno-clavicular joint on the left  Left shoulder pain   She has had left shoulder jolt pain that started about one month ago.  She did see a provider in urgent care on 08/20 and was recommended tylenol with codeine.     Past medical history, medications, allergies, social history, family history reviewed and updated in [facility] as of 9/7/2018 .    ROS  CONSTITUTIONAL: no fatigue, no unexpected change in weight  SKIN: no worrisome rashes, no worrisome moles, no worrisome lesions  EYES: no acute vision problems or changes  ENT: no ear problems, no mouth problems, no throat problems  RESP: no significant cough, no shortness of breath  CV: no chest pain, no palpitations, no new or worsening peripheral edema  GI: no nausea, no vomiting, no constipation, no diarrhea  : no frequency, no dysuria, no hematuria  MS: as above   PSYCHIATRIC: no changes in mood or affect      Objective:  Vitals  /89 (BP Location: Left arm, Cuff Size: Adult Regular)  Pulse 97  Temp 97.5  F (36.4  C) (Oral)  Ht 5' 5\" (1.651 m)  Wt 190 lb (86.2 kg)  LMP 09/08/2004  SpO2 97%  BMI 31.62 kg/m2  GEN: Alert Oriented x3 NAD  HEENT: Atraumatic, normocephalic, neck supple,    CV: RRR no murmurs or rubs  PULM: CTA no wheezes or crackles - oxygen by nasal canula   ABD: Soft, nontender nondistended, no hepatosplenomegally  SKIN: No visible skin lesion or ulcerations  EXT:  No edema " bilateral lower extremities  MS: left shoulder full range of motion and postiive near's sign with resistance   NEURO: Gait and station deferred - using scooter, No focal neurologic deficits  PSYCH: Mood good, affect mood congruent    No images are attached to the encounter.    No results found for this or any previous visit (from the past 24 hour(s)).    Assessment/Plan:  Patient Instructions   (J44.9) Chronic obstructive pulmonary disease, unspecified COPD type (H)  (primary encounter diagnosis)  Comment: Plan for CT scan of the chest in two weeks and follow up for pulmonary function testing in 1 month and continue on current inhalers and oxygen requirements 4 liters when out and 2-3 liters at rest  Plan: COPD ACTION PLAN            (L98.9) Skin erosion  Comment: prescription for nystatin  Plan: nystatin-triamcinolone (MYCOLOG II) cream            (M75.42) Impingement syndrome, shoulder, left  Comment: left shoulder pain thought to be due to impingement of the bicep tendon and I would recommend physical therapy to help with this.  Plan: [facility]               Follow up in 3 months    Disclaimer: This note consists of symbols derived from keyboarding, dictation and/or voice recognition software. As a result, there may be errors in the script that have gone undetected. Please consider this when interpreting information found in this chart.    Doug Godoy MD  [phone]

## 2018-09-07 NOTE — MR AVS SNAPSHOT
After Visit Summary   9/7/2018    Mary Kay Deal    MRN: [id number]           Patient Information     Date Of Birth          1960        Visit Information        Provider Department      9/7/2018 11:00 AM Doug Godoy MD [facility]        Today's Diagnoses     Chronic obstructive pulmonary disease, unspecified COPD type (H)    -  1    Skin erosion        Impingement syndrome, shoulder, left          Care Instructions    (J44.9) Chronic obstructive pulmonary disease, unspecified COPD type (H)  (primary encounter diagnosis)  Comment: Plan for CT scan of the chest in two weeks and follow up for pulmonary function testing in 1 month and continue on current inhalers and oxygen requirements 4 liters when out and 2-3 liters at rest  Plan: COPD ACTION PLAN            (L98.9) Skin erosion  Comment: prescription for nystatin  Plan: nystatin-triamcinolone (MYCOLOG II) cream            (M75.42) Impingement syndrome, shoulder, left  Comment: left shoulder pain thought to be due to impingement of the bicep tendon and I would recommend physical therapy to help with this.  Plan: [facility], AND CHIR[facility]RACTIC REFERRAL                     Follow-ups after your visit        Additional Services     [facility], HAND, AND CHIROPRACTIC REFERRAL       **This order will print in the [facility] Scheduling Office**    Physical Therapy, Hand Therapy and Chiropractic Care are available through:    *[facility]  *[facility]  *[facility]    Call one number to schedule at any of the above locations: [phone].    Your provider has referred you to: Physical Therapy at [facility] or [facility]    Indication/Reason for Referral: Shoulder Pain  Onset of Illness: 1 month  Therapy Orders: Evaluate and Treat  Special Programs: None  Special Request: None    Eileen Arellano      Additional Comments for the Therapist or Chiropractor: clicking at sterno-clavicular joint on left as  well    Please be aware that coverage of these services is subject to the terms and limitations of your health insurance plan.  Call member services at your health plan with any benefit or coverage questions.      Please bring the following to your appointment:    *Your personal calendar for scheduling future appointments  *Comfortable clothing                  Your next 10 appointments already scheduled     Sep 17, 2018 10:30 AM CDT   CT CHEST W/O CONTRAST with SHCT1   [facility] ([facility])    [address] [place] [id number] [phone]           Please bring any scans or X-rays taken at other hospitals, if similar tests were done. Also bring a list of your medicines, including vitamins, minerals and over-the-counter drugs. It is safest to leave personal items at home.  Be sure to tell your doctor:   If you have any allergies.   If there s any chance you are pregnant.   If you are breastfeeding.  You do not need to do anything special to prepare for this exam.  Please wear loose clothing, such as a sweat suit or jogging clothes. Avoid snaps, zippers and other metal. We may ask you to undress and put on a hospital gown.            Sep 19, 2018 10:30 AM CDT   Return Visit with Merrill Nevarez MD   [facility] ([facility])    [facility]  1st Floor  [address]  [facility] [place] [id number] [phone]            Oct 01, 2018 11:00 AM CDT   PFT VISIT with  PFL CHAN   [facility] ([facility])    [address]  3rd Floor  [place] [id number] [phone]            Oct 01, 2018 11:20 AM CDT   (Arrive by 11:05 AM)   Return Visit with Keith Mason MD   [facility] ([facility])    [address]  [address]  [place] [place] [id number] [phone]              Who to contact     If you  "have questions or need follow up information about today's clinic visit or your schedule please contact [facility] directly at [phone].  Normal or non-critical lab and imaging results will be communicated to you by [facility], letter or phone within 4 business days after the clinic has received the results. If you do not hear from us within 7 days, please contact the clinic through [facility] or phone. If you have a critical or abnormal lab result, we will notify you by phone as soon as possible.  Submit refill requests through [facility] or call your pharmacy and they will forward the refill request to us. Please allow 3 business days for your refill to be completed.          Additional Information About Your Visit        [facility] Information     [facility] gives you secure access to your electronic health record. If you see a primary care provider, you can also send messages to your care team and make appointments. If you have questions, please call your primary care clinic.  If you do not have a primary care provider, please call [phone] and they will assist you.        Care EveryWhere ID     This is your Care EveryWhere ID. This could be used by other organizations to access your [facility] medical records  [id number]        Your Vitals Were     Pulse Temperature Height Last Period Pulse Oximetry BMI (Body Mass Index)    97 97.5  F (36.4  C) (Oral) 5' 5\" (1.651 m) 09/08/2004 97% 31.62 kg/m2       Blood Pressure from Last 3 Encounters:   09/07/18 146/89   08/20/18 136/88   07/20/18 (!) 140/100    Weight from Last 3 Encounters:   09/07/18 190 lb (86.2 kg)   03/26/18 190 lb (86.2 kg)   01/29/18 190 lb (86.2 kg)              We Performed the Following     COPD ACTION PLAN     DEPRESSION ACTION PLAN (DAP)     FLU VACCINE, INCREASED ANTIGEN, PRESV FREE     [facility] HAND, AND CHIROPRACTIC REFERRAL          Today's Medication Changes          These changes are accurate as of 9/7/18 11:44 AM.  If you have any " questions, ask your nurse or doctor.               Start taking these medicines.        Dose/Directions    nystatin-triamcinolone cream   Commonly known as:  MYCOLOG II   Used for:  Skin erosion   Started by:  Doug Godoy MD        Apply  topically 2 times daily.   Quantity:  45 g   Refills:  1            Where to get your medicines      These medications were sent to [facility], [place] - [address]  [address], [facility] [address]     Phone:  [phone]     nystatin-triamcinolone cream                Primary Care Provider Office Phone # Fax #    Doug Godoy [phone] [address]  [place] [address]        Equal Access to Services     CARLOS PARKINSON : Hadii lashae figueredo hadasho Soomaali, waaxda luqadaha, qaybta kaalmada adeegyada, jarvis ribera haybubba merino . So [facility] [phone].    ATENCIÓN: Si habla español, tiene a rene disposición servicios gratuitos de asistencia lingüística. Llame al [phone].    We comply with applicable federal civil rights laws and [place] laws. We do not discriminate on the basis of race, color, national origin, age, disability, sex, sexual orientation, or gender identity.            Thank you!     Thank you for choosing [facility]  for your care. Our goal is always to provide you with excellent care. Hearing back from our patients is one way we can continue to improve our services. Please take a few minutes to complete the written survey that you may receive in the mail after your visit with us. Thank you!             Your Updated Medication List - Protect others around you: Learn how to safely use, store and throw away your medicines at www.disposemymeds.org.          This list is accurate as of 9/7/18 11:44 AM.  Always use your most recent med list.                   Brand Name Dispense Instructions for use Diagnosis    acetaminophen-codeine  300-30 MG per tablet    TYLENOL #3    15 tablet    Take 1 tablet by mouth every 6 hours as needed for severe pain    Acute pain of left shoulder       arformoterol 15 MCG/2ML Nebu neb solution    BROVANA    120 mL    Take 2 mLs (15 mcg) by nebulization 2 times daily    Centrilobular emphysema (H)       azithromycin 250 MG tablet    ZITHROMAX    30 tablet    Take 1 tablet (250 mg) by mouth daily    Centrilobular emphysema (H)       budesonide 1 MG/2ML Susp neb solution    PULMICORT    60 ampule    Take 2 mLs (1 mg) by nebulization 2 times daily    Centrilobular emphysema (H)       calcium carbonate 600 MG tablet   Generic drug:  calcium      Take 1 tablet by mouth 2 times daily. 1-2 tablets as tolerated        Chlorpheniramine-DM 4-20 MG Tabs     84 tablet    Take 1 tablet by mouth every 6 hours as needed.    COPD (chronic obstructive pulmonary disease) (H)       Fingertip Pulse Oximeter Misc     1 each    To be used as needed - for SOB.    COPD (chronic obstructive pulmonary disease) (H), Respiratory failure with hypoxia (H)       fluticasone 50 MCG/ACT spray    FLONASE    16 g    Spray 1 spray into both nostrils 2 times daily    Centrilobular emphysema (H)       LORazepam 0.5 MG tablet    ATIVAN    90 tablet    Take 1 tablet (0.5 mg) by mouth every 8 hours as needed for anxiety    Anxiety       montelukast 10 MG tablet    SINGULAIR    30 tablet    Take 1 tablet (10 mg) by mouth every evening    Centrilobular emphysema (H)       MULTIVITAMIN TABS   OR      1 TABLET BY MOUTH DAILY        Nebulizer Compressor Kit     1 kit    1 Device 4 times daily    COPD (chronic obstructive pulmonary disease) (H)       nystatin-triamcinolone cream    MYCOLOG II    45 g    Apply  topically 2 times daily.    Skin erosion       omeprazole 20 MG CR capsule    priLOSEC    180 capsule    TAKE ONE CAPSULE BY MOUTH TWICE A DAY    Gastroesophageal reflux disease with esophagitis       order for DME      O2 at night and prn        order for  DME     1 Device    Equipment being ordered: Oxygen --Please provide oxygen at 4 LPM via nasal canula with exertion and with sleep. Can be on room air at rest. Please provide portability. Keep oxygen saturations above 90%.    COPD (chronic obstructive pulmonary disease) (H)       SENNA LAXATIVE PO      2 TABLET BY MOUTH DAILY AS NEEDED FOR CONSTIPATION        sertraline 50 MG tablet    ZOLOFT    135 tablet    TAKE ONE AND ONE-HALF TABLET BY MOUTH EVERY DAY    Major depressive disorder, single episode, mild (H)       simvastatin 20 MG tablet    ZOCOR    90 tablet    Take 1 tablet (20 mg) by mouth At Bedtime    Hyperlipidemia LDL goal <130       tiotropium 18 MCG capsule    SPIRIVA HANDIHALER    90 capsule    Inhale 1 capsule (18 mcg) into the lungs daily    Centrilobular emphysema (H)       VENTOLIN  (90 Base) MCG/ACT inhaler   Generic drug:  albuterol     54 g    INHALE 2 PUFFS BY MOUTH INTO THE LUNGS EVERY 4 HOURS AS NEEDED    COPD (chronic obstructive pulmonary disease) (H), Oral thrush       vitamin D 1000 units capsule      Take 1 capsule by mouth 2 times daily.

## 2018-09-07 NOTE — PATIENT INSTRUCTIONS
(J44.9) Chronic obstructive pulmonary disease, unspecified COPD type (H)  (primary encounter diagnosis)  Comment: Plan for CT scan of the chest in two weeks and follow up for pulmonary function testing in 1 month and continue on current inhalers and oxygen requirements 4 liters when out and 2-3 liters at rest  Plan: COPD ACTION PLAN            (L98.9) Skin erosion  Comment: prescription for nystatin  Plan: nystatin-triamcinolone (MYCOLOG II) cream            (M75.42) Impingement syndrome, shoulder, left  Comment: left shoulder pain thought to be due to impingement of the bicep tendon and I would recommend physical therapy to help with this.  Plan: [facility], AND CHIROPRACTIC REFERRAL

## 2018-09-08 ASSESSMENT — PATIENT HEALTH QUESTIONNAIRE - PHQ9: SUM OF ALL RESPONSES TO PHQ QUESTIONS 1-9: 8

## 2018-09-08 ASSESSMENT — ANXIETY QUESTIONNAIRES: GAD7 TOTAL SCORE: 4

## 2018-09-17 ENCOUNTER — THERAPY VISIT (OUTPATIENT)
Dept: PHYSICAL THERAPY | Facility: CLINIC | Age: 58
End: 2018-09-17
Payer: MEDICARE

## 2018-09-17 ENCOUNTER — HOSPITAL ENCOUNTER (OUTPATIENT)
Dept: CT IMAGING | Facility: CLINIC | Age: 58
Discharge: HOME | End: 2018-09-17
Attending: NURSE PRACTITIONER | Admitting: NURSE PRACTITIONER
Payer: MEDICARE

## 2018-09-17 DIAGNOSIS — M25.512 ACUTE PAIN OF LEFT SHOULDER: ICD-10-CM

## 2018-09-17 DIAGNOSIS — C34.12 PRIMARY MALIGNANT NEOPLASM OF LEFT UPPER LOBE OF LUNG (H): ICD-10-CM

## 2018-09-17 PROCEDURE — 97110 THERAPEUTIC EXERCISES: CPT | Mod: GP | Performed by: PHYSICAL THERAPIST

## 2018-09-17 PROCEDURE — 97162 PT EVAL MOD COMPLEX 30 MIN: CPT | Mod: GP | Performed by: PHYSICAL THERAPIST

## 2018-09-17 PROCEDURE — G8981 BODY POS CURRENT STATUS: HCPCS | Mod: GP | Performed by: PHYSICAL THERAPIST

## 2018-09-17 PROCEDURE — 71250 CT THORAX DX C-: CPT

## 2018-09-17 PROCEDURE — G8982 BODY POS GOAL STATUS: HCPCS | Mod: GP | Performed by: PHYSICAL THERAPIST

## 2018-09-17 NOTE — LETTER
"DEPARTMENT OF HEALTH AND HUMAN SERVICES  CENTERS FOR MEDICARE & MEDICAID SERVICES    PLAN/UPDATED PLAN OF PROGRESS FOR OUTPATIENT REHABILITATION    PATIENTS NAME:  Mary Kay Deal   : 1960  PROVIDER NUMBER:    [id number]  HICN:  [id number]  PROVIDER NAME: [place] [facility]  MEDICAL RECORD NUMBER: [id number]   START OF CARE DATE:    18  TYPE:  PT  PRIMARY/TREATMENT DIAGNOSIS: (Pertinent Medical Diagnosis)  Acute pain of left shoulder  VISITS FROM START OF CARE:  Rxs Used: 1     [place] [facility] Initial Evaluation  Initial Evaluation   Subjective:  PRIMARY COMPLAINT/HISTORY:   Patient reports left \"shoulder pain\" but points to her left upper trapezius region. Patient also has a history of neck pain that has been ongoing for \"years\" in which she denies any numbness or tingling specifically, but feels a occasional \"lightning\" into the left hand digits 4-5.  Patient also has a history of pulmonary compromise and will be seeing her pulmonologist on 2018 for a follow up. Patient denies pain into the left shoulder with taking deep breaths, denies any injury to the neck or shoulder.  Patient reports that she uses a power scooter when leaving home, but will typically ambulate at home without an assistive device.  She requires 2-3 liters of O2 at rest/4 Liters with activity, in which her  is able to help negotiate the oxygen tank if she is at home. Patient denies symptom reproduction with coughing or sneezing today. She does report some clicking that recently started which is not her main complaint, and reports that the symptoms in her neck and down the left medial arm is her main complaint.  She does report that because her health is \"so poor\" and that \"a lot is going on,\" she does have difficulty always localizing her pain.   ONSET: 18, \"about 3 to 4 weeks ago\"  HISTORY OF PRESENT ILLNESS/MECHANISM OF INJURY:  Patient attributes this to a " "cough that was painful and \"shot\" down into the left arm.      PRIOR LEVEL OF FUNCTION: able to sleep on the left side with less difficulties, able to use the left arm for reaching as needed but is right arm dominant  CURRENT LEVEL OF FUNCTION: can't sleep on left side due to the shoulder pain, not using the left arm as frequently  OCCUPATION/WORK STATUS:  \"disabled\" and patient reports prolonged sitting  GENERAL HEALTH:  poor  RECREATIONAL ACTIVITY/EXERCISE: none        PATIENTS NAME:  Mary Kay Deal   : 1960    PAIN: 2/10  CONSTANT/INTERMITTENT: intermittent   QUALITY: \"sharp\", \"lightning\"  STATUS:   improving  MANAGEMENT TO DATE: exercises, heating pad, ibuprofen  AGGRAVATING FACTORS: patient reports she is unsure when this happens as it is not consistent and was unable to identify aggravating factors  ALLEVIATING FACTORS: heating pad of short term relief  SLEEP:   Sleeps on the left side, right side sleeping \"bothers the lungs\" and she cannot sleep supine due to her pulmonary issues, patient reports she is not sleeping well but has never slept weird  MEDICAL SCREENING     PAST MEDICAL HISTORY:  History of lung cancer (did go through radiation therapy 5-6 years ago), asthma, chemical dependency, dizziness, depression, emphysema, history of fractures, osteoporosis, overweight, smoking history, chest pain, cold/hot extremity, pain at night/rest, significant weakness. Patient reports that her medical team is unsure if she truly had lung cancer as her lung function is \"less than 25%.\"   IMAGING/DIAGNOSTIC TESTS: none   PRECAUTIONS: Patient is required to be on 4 liters of O2 when moving, but patient is on 2-3 liters at rest.   Patient does report intermittent dizziness and headaches in which she had episodes 3 to 4 times in the last week.  She normally is not dizzy, but did attribute dizziness to her \"neck exercises.\"  Patient will pay attention to her complaints.   History of Falling: negative  PATIENT S GOALS: " "  1. To get rid of her pain.        Objective:  GAIT/OBSERVATION/STATIC POSTURE: Patient utilizing a motorized scooter, which is carrying her oxygen tank, nasal canula is utilized (see above for precautions), forward head, forward shoulders bilaterally, sitting slouched in the scooter chair, patient alert for most of examination but did close her eyes intermittently for short periods of time    CERVICAL ROM: AROM was WFL bilaterally, no reproduction of symptoms. Contralateral neck \"stretch\" with cervical side bend.  DERMATOMES: No overt deficits in response to light touch to the C4-T1 dermatomes, bilaterally    PATIENTS NAME:  Mary Kay Deal   : 1960    MYOTOMES: No overt weakness bilaterally with testing to C4-T1 dermatomes.   REFLEXES: 2+/4+ bilaterally to the biceps and brachioradialis reflexes bilaterally  SHOULDER:    PROM R  PROM L AROM R AROM L MMT R MMT L   Flexion  170 degrees 160 degrees 155 degrees, \"lightning\"  4/5 3+/5   Abduction  170 degrees 165 degrees 156 degrees, into left digits 4-5, did not remain 4-/5 3+/5, painful \"in the neck\" unable to localize   ER   WNL WNL 4/5 3/5   IR   WNL WNL 4/5 4/5      SPECIAL TESTS AND OTHER TESTS:     Alex s Impingement:  ( - )L    ( - )R  Amaya schulz Pal:  ( - )L    ( - )R          Painful Arc:  ( - )L    ( - )R                                                     ASSESSMENT/PLAN  Patient is a 58 year old female with left side shoulder complaints. Cervicogenic involvement is yet to be ruled down as patient was unable to localize complaints.  Most shoulder testing did not provoke her primary complaints today, but she did have some symptom reproduction of left neck pain and left distal symptoms into digits 4-5.    Patient has the following significant findings with corresponding treatment plan.                Diagnosis 1:  Left upper quarter dysfunction  Pain -  manual therapy, self management, education, directional preference exercise and home " program  Decreased ROM/flexibility - manual therapy and therapeutic exercise  Decreased strength - therapeutic exercise and therapeutic activities  Impaired muscle performance - neuro re-education  Impaired posture - neuro re-education              PATIENTS NAME:  Mary Kay Deal YOB: 1960    Therapy Evaluation Codes:   1) History comprised of:   Personal factors that impact the plan of care:      Overall behavior pattern and Past/current experiences.    Comorbidity factors that impact the plan of care are:      Asthma, Cancer, Dizziness, Heart problems and Pulmonary compromise.     Medications impacting care: None.  2) Examination of Body Systems comprised of:   Body structures and functions that impact the plan of care:      Cervical spine and Shoulder.   Activity limitations that impact the plan of care are:      Lifting and Sleeping.  3) Clinical presentation characteristics are:   Evolving/Changing.  4) Decision-Making    Moderate complexity using standardized patient assessment instrument and/or measureable assessment of functional outcome.  Cumulative Therapy Evaluation is: Moderate complexity.  Previous and current functional limitations:  (See Goal Flow Sheet for this information)    Short term and Long term goals: (See Goal Flow Sheet for this information)   Communication ability:  Patient appears to be able to clearly communicate and understand verbal and written communication and follow directions correctly.  Treatment Explanation - The following has been discussed with the patient:   RX ordered/plan of care  Anticipated outcomes  Possible risks and side effects  This patient would benefit from PT intervention to resume normal activities.   Rehab potential is good.  Frequency:  1 X week, once daily  Duration:  for 6 weeks  Discharge Plan:  Achieve all LTG.  Independent in home treatment program.  Reach maximal therapeutic benefit.                                          Caregiver Signature/Credentials  "_____________________________ Date ________       Treating Provider: García Bustos PT   I have reviewed and certified the need for these services and plan of treatment while under my care.        PHYSICIAN'S SIGNATURE:   _________________________________________  Date___________   Doug Godoy MD    Certification period:    to        Functional Level Progress Report: Please see attached \"Goal Flow sheet for Functional level.\"    ____X____ Continue Services or       ________ DC Services                Service dates: From    date to present                         "

## 2018-09-17 NOTE — PROGRESS NOTES
"[facility] Initial Evaluation  Initial Evaluation   Subjective:  PRIMARY COMPLAINT/HISTORY:   Patient reports left \"shoulder pain\" but points to her left upper trapezius region. Patient also has a history of neck pain that has been ongoing for \"years\" in which she denies any numbness or tingling specifically, but feels a occasional \"lightning\" into the left hand digits 4-5.  Patient also has a history of pulmonary compromise and will be seeing her pulmonologist on October 2, 2018 for a follow up. Patient denies pain into the left shoulder with taking deep breaths, denies any injury to the neck or shoulder.  Patient reports that she uses a power scooter when leaving home, but will typically ambulate at home without an assistive device.  She requires 2-3 liters of O2 at rest/4 Liters with activity, in which her  is able to help negotiate the oxygen tank if she is at home. Patient denies symptom reproduction with coughing or sneezing today. She does report some clicking that recently started which is not her main complaint, and reports that the symptoms in her neck and down the left medial arm is her main complaint.  She does report that because her health is \"so poor\" and that \"a lot is going on,\" she does have difficulty always localizing her pain.   ONSET: 8/1/18, \"about 3 to 4 weeks ago\"  HISTORY OF PRESENT ILLNESS/MECHANISM OF INJURY:  Patient attributes this to a cough that was painful and \"shot\" down into the left arm.      PRIOR LEVEL OF FUNCTION: able to sleep on the left side with less difficulties, able to use the left arm for reaching as needed but is right arm dominant  CURRENT LEVEL OF FUNCTION: can't sleep on left side due to the shoulder pain, not using the left arm as frequently  OCCUPATION/WORK STATUS:  \"disabled\" and patient reports prolonged sitting  GENERAL HEALTH:  poor  RECREATIONAL ACTIVITY/EXERCISE: none    PAIN: 2/10  CONSTANT/INTERMITTENT: intermittent   QUALITY: " "\"sharp\", \"lightning\"  STATUS:   improving  MANAGEMENT TO DATE: exercises, heating pad, ibuprofen  AGGRAVATING FACTORS: patient reports she is unsure when this happens as it is not consistent and was unable to identify aggravating factors  ALLEVIATING FACTORS: heating pad of short term relief  SLEEP:   Sleeps on the left side, right side sleeping \"bothers the lungs\" and she cannot sleep supine due to her pulmonary issues, patient reports she is not sleeping well but has never slept weird  MEDICAL SCREENING     PAST MEDICAL HISTORY:  History of lung cancer (did go through radiation therapy 5-6 years ago), asthma, chemical dependency, dizziness, depression, emphysema, history of fractures, osteoporosis, overweight, smoking history, chest pain, cold/hot extremity, pain at night/rest, significant weakness. Patient reports that her medical team is unsure if she truly had lung cancer as her lung function is \"less than 25%.\"   IMAGING/DIAGNOSTIC TESTS: none   PRECAUTIONS: Patient is required to be on 4 liters of O2 when moving, but patient is on 2-3 liters at rest.   Patient does report intermittent dizziness and headaches in which she had episodes 3 to 4 times in the last week.  She normally is not dizzy, but did attribute dizziness to her \"neck exercises.\"  Patient will pay attention to her complaints.   History of Falling: negative  PATIENT S GOALS:   1. To get rid of her pain.        Objective:  GAIT/OBSERVATION/STATIC POSTURE: Patient utilizing a motorized scooter, which is carrying her oxygen tank, nasal canula is utilized (see above for precautions), forward head, forward shoulders bilaterally, sitting slouched in the scooter chair, patient alert for most of examination but did close her eyes intermittently for short periods of time    CERVICAL ROM: AROM was WFL bilaterally, no reproduction of symptoms. Contralateral neck \"stretch\" with cervical side bend.  DERMATOMES: No overt deficits in response to light touch to " "the C4-T1 dermatomes, bilaterally  MYOTOMES: No overt weakness bilaterally with testing to C4-T1 dermatomes.   REFLEXES: 2+/4+ bilaterally to the biceps and brachioradialis reflexes bilaterally  SHOULDER:    PROM R  PROM L AROM R AROM L MMT R MMT L   Flexion  170 degrees 160 degrees 155 degrees, \"lightning\"  4/5 3+/5   Abduction  170 degrees 165 degrees 156 degrees, into left digits 4-5, did not remain 4-/5 3+/5, painful \"in the neck\" unable to localize   ER   WNL WNL 4/5 3/5   IR   WNL WNL 4/5 4/5      SPECIAL TESTS AND OTHER TESTS:     [place] s Impingement:  ( - )L    ( - )R  Amaya schulz Pal:  ( - )L    ( - )R          Painful Arc:  ( - )L    ( - )R                                                     ASSESSMENT/PLAN  Patient is a 58 year old female with left side shoulder complaints. Cervicogenic involvement is yet to be ruled down as patient was unable to localize complaints.  Most shoulder testing did not provoke her primary complaints today, but she did have some symptom reproduction of left neck pain and left distal symptoms into digits 4-5.    Patient has the following significant findings with corresponding treatment plan.                Diagnosis 1:  Left upper quarter dysfunction  Pain -  manual therapy, self management, education, directional preference exercise and home program  Decreased ROM/flexibility - manual therapy and therapeutic exercise  Decreased strength - therapeutic exercise and therapeutic activities  Impaired muscle performance - neuro re-education  Impaired posture - neuro re-education    Therapy Evaluation Codes:   1) History comprised of:   Personal factors that impact the plan of care:      Overall behavior pattern and Past/current experiences.    Comorbidity factors that impact the plan of care are:      Asthma, Cancer, Dizziness, Heart problems and Pulmonary compromise.     Medications impacting care: None.  2) Examination of Body Systems comprised of:   Body structures and functions " that impact the plan of care:      Cervical spine and Shoulder.   Activity limitations that impact the plan of care are:      Lifting and Sleeping.  3) Clinical presentation characteristics are:   Evolving/Changing.  4) Decision-Making    Moderate complexity using standardized patient assessment instrument and/or measureable assessment of functional outcome.  Cumulative Therapy Evaluation is: Moderate complexity.    Previous and current functional limitations:  (See Goal Flow Sheet for this information)    Short term and Long term goals: (See Goal Flow Sheet for this information)     Communication ability:  Patient appears to be able to clearly communicate and understand verbal and written communication and follow directions correctly.  Treatment Explanation - The following has been discussed with the patient:   RX ordered/plan of care  Anticipated outcomes  Possible risks and side effects  This patient would benefit from PT intervention to resume normal activities.   Rehab potential is good.    Frequency:  1 X week, once daily  Duration:  for 6 weeks  Discharge Plan:  Achieve all LTG.  Independent in home treatment program.  Reach maximal therapeutic benefit.    Please refer to the daily flowsheet for treatment today, total treatment time and time spent performing 1:1 timed codes.     HPI                    Objective:  System    Physical Exam    General     ROS

## 2018-09-17 NOTE — LETTER
"DEPARTMENT OF HEALTH AND HUMAN SERVICES  [facility] FOR MEDICARE & MEDICAID SERVICES    PLAN/UPDATED PLAN OF PROGRESS FOR OUTPATIENT REHABILITATION    PATIENTS NAME:  Mary Kay Deal   : 1960  PROVIDER NUMBER:    [id number]  HICN:  [id number]    PROVIDER NAME: [facility]  MEDICAL RECORD NUMBER: [id number]   START OF CARE DATE:  SOC Date: 18   TYPE:  PT  PRIMARY/TREATMENT DIAGNOSIS: (Pertinent Medical Diagnosis)  Acute pain of left shoulder  VISITS FROM START OF CARE:  Rxs Used: 1     [place] [facility] Initial Evaluation  Initial Evaluation Subjective:  PRIMARY COMPLAINT/HISTORY:   Patient reports left \"shoulder pain\" but points to her left upper trapezius region. Patient also has a history of neck pain that has been ongoing for \"years\" in which she denies any numbness or tingling specifically, but feels a occasional \"lightning\" into the left hand digits 4-5.  Patient also has a history of pulmonary compromise and will be seeing her pulmonologist on 2018 for a follow up. Patient denies pain into the left shoulder with taking deep breaths, denies any injury to the neck or shoulder.  Patient reports that she uses a power scooter when leaving home, but will typically ambulate at home without an assistive device.  She requires 2-3 liters of O2 at rest/4 Liters with activity, in which her  is able to help negotiate the oxygen tank if she is at home. Patient denies symptom reproduction with coughing or sneezing today. She does report some clicking that recently started which is not her main complaint, and reports that the symptoms in her neck and down the left medial arm is her main complaint.  She does report that because her health is \"so poor\" and that \"a lot is going on,\" she does have difficulty always localizing her pain.   ONSET: 18, \"about 3 to 4 weeks ago\"  HISTORY OF PRESENT ILLNESS/MECHANISM OF INJURY:  Patient attributes " "this to a cough that was painful and \"shot\" down into the left arm.      PRIOR LEVEL OF FUNCTION: able to sleep on the left side with less difficulties, able to use the left arm for reaching as needed but is right arm dominant  CURRENT LEVEL OF FUNCTION: can't sleep on left side due to the shoulder pain, not using the left arm as frequently  OCCUPATION/WORK STATUS:  \"disabled\" and patient reports prolonged sitting  GENERAL HEALTH:  poor  RECREATIONAL ACTIVITY/EXERCISE: none  PAIN: 10  CONSTANT/INTERMITTENT: intermittent   QUALITY: \"sharp\", \"lightning\"  STATUS:   improving  MANAGEMENT TO DATE: exercises, heating pad, ibuprofen      PATIENTS NAME:  Mary Kay Deal   : 1960  AGGRAVATING FACTORS: patient reports she is unsure when this happens as it is not consistent and was unable to identify aggravating factors  ALLEVIATING FACTORS: heating pad of short term relief  SLEEP:   Sleeps on the left side, right side sleeping \"bothers the lungs\" and she cannot sleep supine due to her pulmonary issues, patient reports she is not sleeping well but has never slept weird  MEDICAL SCREENING     PAST MEDICAL HISTORY:  History of lung cancer (did go through radiation therapy 5-6 years ago), asthma, chemical dependency, dizziness, depression, emphysema, history of fractures, osteoporosis, overweight, smoking history, chest pain, cold/hot extremity, pain at night/rest, significant weakness. Patient reports that her medical team is unsure if she truly had lung cancer as her lung function is \"less than 25%.\"   IMAGING/DIAGNOSTIC TESTS: none   PRECAUTIONS: Patient is required to be on 4 liters of O2 when moving, but patient is on 2-3 liters at rest.   Patient does report intermittent dizziness and headaches in which she had episodes 3 to 4 times in the last week.  She normally is not dizzy, but did attribute dizziness to her \"neck exercises.\"  Patient will pay attention to her complaints.   History of Falling: negative  PATIENT S " "GOALS:   1. To get rid of her pain.        Objective:  GAIT/OBSERVATION/STATIC POSTURE: Patient utilizing a motorized scooter, which is carrying her oxygen tank, nasal canula is utilized (see above for precautions), forward head, forward shoulders bilaterally, sitting slouched in the scooter chair, patient alert for most of examination but did close her eyes intermittently for short periods of time    CERVICAL ROM: AROM was WFL bilaterally, no reproduction of symptoms. Contralateral neck \"stretch\" with cervical side bend.  DERMATOMES: No overt deficits in response to light touch to the C4-T1 dermatomes, bilaterally  MYOTOMES: No overt weakness bilaterally with testing to C4-T1 dermatomes.   REFLEXES: 2+/4+ bilaterally to the biceps and brachioradialis reflexes bilaterally  SHOULDER:     PATIENTS NAME:  Mary Kay Deal   : 1960   PROM R  PROM L AROM R AROM L MMT R MMT L   Flexion  170 degrees 160 degrees 155 degrees, \"lightning\"  4/5 3+/5   Abduction  170 degrees 165 degrees 156 degrees, into left digits 4-5, did not remain 4-/5 3+/5, painful \"in the neck\" unable to localize   ER   WNL WNL 4/5 3/5   IR   WNL WNL 4/5 4/5      SPECIAL TESTS AND OTHER TESTS:     Alex s Impingement:  ( - )L    ( - )R  Amaya schulz Pal:  ( - )L    ( - )R          Painful Arc:  ( - )L    ( - )R                                          ASSESSMENT/PLAN  Patient is a 58 year old female with left side shoulder complaints. Cervicogenic involvement is yet to be ruled down as patient was unable to localize complaints.  Most shoulder testing did not provoke her primary complaints today, but she did have some symptom reproduction of left neck pain and left distal symptoms into digits 4-5.    Patient has the following significant findings with corresponding treatment plan.                Diagnosis 1:  Left upper quarter dysfunction  Pain -  manual therapy, self management, education, directional preference exercise and home program  Decreased " ROM/flexibility - manual therapy and therapeutic exercise  Decreased strength - therapeutic exercise and therapeutic activities  Impaired muscle performance - neuro re-education  Impaired posture - neuro re-education    Therapy Evaluation Codes:   1) History comprised of:   Personal factors that impact the plan of care:      Overall behavior pattern and Past/current experiences.    Comorbidity factors that impact the plan of care are:      Asthma, Cancer, Dizziness, Heart problems and Pulmonary compromise.     Medications impacting care: None.  2) Examination of Body Systems comprised of:   Body structures and functions that impact the plan of care:      Cervical spine and Shoulder.  PATIENTS NAME:  Mary Kay Deal   : 1960     Activity limitations that impact the plan of care are:      Lifting and Sleeping.  3) Clinical presentation characteristics are:   Evolving/Changing.  4) Decision-Making    Moderate complexity using standardized patient assessment instrument and/or measureable assessment of functional outcome.  Cumulative Therapy Evaluation is: Moderate complexity.    Previous and current functional limitations:  (See Goal Flow Sheet for this information)    Short term and Long term goals: (See Goal Flow Sheet for this information)     Communication ability:  Patient appears to be able to clearly communicate and understand verbal and written communication and follow directions correctly.  Treatment Explanation - The following has been discussed with the patient:   RX ordered/plan of care  Anticipated outcomes  Possible risks and side effects  This patient would benefit from PT intervention to resume normal activities.   Rehab potential is good.    Frequency:  1 X week, once daily  Duration:  for 6 weeks  Discharge Plan:  Achieve all LTG.  Independent in home treatment program.  Reach maximal therapeutic benefit.    Caregiver Signature/Credentials _____________________________ Date ________       Treating  "Provider: García Bustos PT   I have reviewed and certified the need for these services and plan of treatment while under my care.        PHYSICIAN'S SIGNATURE:   _________________________________________  Date___________   Doug Godoy MD    Certification period:  Beginning of Cert date period: 09/17/18 to  End of Cert period date: 10/29/18     Functional Level Progress Report: Please see attached \"Goal Flow sheet for Functional level.\"    ____X____ Continue Services or       ________ DC Services                Service dates: From  SOC Date: 09/17/18 date to present                         "

## 2018-09-19 ENCOUNTER — OFFICE VISIT (OUTPATIENT)
Dept: RADIATION ONCOLOGY | Facility: CLINIC | Age: 58
End: 2018-09-19
Attending: RADIOLOGY
Payer: MEDICARE

## 2018-09-19 VITALS — SYSTOLIC BLOOD PRESSURE: 130 MMHG | DIASTOLIC BLOOD PRESSURE: 90 MMHG | HEART RATE: 82 BPM

## 2018-09-19 DIAGNOSIS — C34.12 MALIGNANT NEOPLASM OF UPPER LOBE OF LEFT LUNG (H): Primary | ICD-10-CM

## 2018-09-19 PROCEDURE — G0463 HOSPITAL OUTPT CLINIC VISIT: HCPCS | Performed by: RADIOLOGY

## 2018-09-19 NOTE — LETTER
2018       RE: Mary Kay Deal  [address]  [facility] [address]     Dear Colleague,    Thank you for referring your patient, Mary Kay Deal, to the RADIATION ONCOLOGY CLINIC. Please see a copy of my visit note below.    Radiation Oncology Follow-up Note  DATE:2018  NAME:Mary Kay Deal   MRN: [id number]  : 1960     DISEASE TREATED:  Presumed NSCLC of the left upper lobe, [id number]     TYPE OF RADIATION THERAPY ADMINISTERED: SBRT, 50 Gy in 5 fractions to the 80% IDL    INTERVAL SINCE COMPLETION OF RADIATION THERAPY: 5 years (Completed 2013)     SUBJECTIVE:    is a 57 year old woman with history of recurrent fungal infection and non-biopsy proven stage IA lung cancer of the RUBEN. She received SBRT as described above, completed on 2013.    She had a follow up CT chest on 18. It showed stable appearance of the chest. There was no evidence of progressive or recurrent malignancy. She returns today for a routine follow up visit.On exam,Ms. Deal is overall doing well. She denies any new shortness of breath; she remains on 3L/min of oxygen via concentrator at all times, up to 3-4L with exertion and at night. She otherwise denies chest pain, cough , hemoptysis, fevers, chills, nausea, vomiting, diarrhea. Her ROS are otherwise unremarkable.     OBJECTIVE:   /90  Pulse 82  LMP 2004  GENERAL:  Appears well, in no acute distress.   CHEST: Clear bilateral breath sounds without wheezes or crackles.    IMAGING:   CT CHEST on 18:  IMPRESSION:   Stable appearance of the chest. No evidence of progressive or recurrent malignancy.    IMPRESSION: No clinical evidence for recurrent disease at this time.      RECOMMENDATIONS:  Patient will follow up with Nicole in 1 year with a repeat CT chest w/o contrast.     We spent 20 min with the patient, >50% devoted to counseling. The patient has many questions during our conversation that were answered to his satisfaction and verbalized  understanding.     Ms. Muñiz was seen and discussed with the staff, Dr. Roque Bahena MD  PGY-2 Resident, Radiation Oncology  [facility]    I saw the patient with the resident.  I agree with the resident's note and plan of care.      RENÉ Nevarez M.D.  Department of Radiation Oncology  [facility]    FOLLOW-UP VISIT    Patient Name: Mary Kay Deal      : 1960     Age: 58 year old        ______________________________________________________________________________     Chief Complaint   Patient presents with     Cancer     5 year fup for rad therapy to RUBEN  SBRT     /90  Pulse 82  LMP 2004     Date Radiation Completed: 13    Pain  Denies    Labs  Other Labs: No    Imaging  CT: chest  18          Other Appointments:     MD Name:  Appointment Date:    MD Name: Appointment Date:   MD Name: Appointment Date:   Other Appointment Notes:     Residual Radiation side effect: none     Additional Instructions:     Nurse face-to-face time: Level 3:  10 min face to face time      Again, thank you for allowing me to participate in the care of your patient.      Sincerely,    Merrill Nevarez MD

## 2018-09-19 NOTE — PROGRESS NOTES
FOLLOW-UP VISIT    Patient Name: Mary Kay Deal      : 1960     Age: 58 year old        ______________________________________________________________________________     Chief Complaint   Patient presents with     Cancer     5 year fup for rad therapy to RUBEN  SBRT     /90  Pulse 82  LMP 2004     Date Radiation Completed: 13    Pain  Denies    Labs  Other Labs: No    Imaging  CT: chest  18          Other Appointments:     MD Name:  Appointment Date:    MD Name: Appointment Date:   MD Name: Appointment Date:   Other Appointment Notes:     Residual Radiation side effect: none     Additional Instructions:     Nurse face-to-face time: Level 3:  10 min face to face time

## 2018-09-19 NOTE — MR AVS SNAPSHOT
After Visit Summary   9/19/2018    Mary Kay Deal    MRN: [id number]           Patient Information     Date Of Birth          1960        Visit Information        Provider Department      9/19/2018 10:30 AM Merrill Nevarez MD [facility]        Today's Diagnoses     Malignant neoplasm of upper lobe of left lung (H)    -  1       Follow-ups after your visit        Your next 10 appointments already scheduled     Oct 01, 2018 11:00 AM CDT   PFT VISIT with  PFL [facility] Pulmonary Function Testing ([facility])    [address]  3rd Floor  [place] [address]   [phone]            Oct 01, 2018 11:20 AM CDT   (Arrive by 11:05 AM)   Return Visit with Keith Mason MD   [facility] ([facility])    [address]  [address]  [place] [address]   [phone]            Sep 16, 2019 11:00 AM CDT   (Arrive by 10:45 AM)   CT CHEST W/O CONTRAST with CT1   [facility] [place] [facility] ([facility])    [address]  1st Floor  [place] [address]   [phone]           How do I prepare for my exam? (Food and drink instructions) No Food and Drink Restrictions.  How do I prepare for my exam? (Other instructions) You do not need to do anything special to prepare for this exam. For a sinus scan: Use your nose spray (nasal decongestant spray) as directed.  What should I wear: Please wear loose clothing, such as a sweat suit or jogging clothes. Avoid snaps, zippers and other metal. We may ask you to undress and put on a hospital gown.  How long does the exam take: Most scans take less than 20 minutes.  What should I bring: Please bring any scans or X-rays taken at other hospitals, if similar tests were done. Also bring a list of your medicines, including vitamins, minerals and over-the-counter drugs. It is safest to leave personal items at  home.  Do I need a : No  is needed.  What do I need to tell my doctor? Be sure to tell your doctor: * If you have any allergies. * If there s any chance you are pregnant. * If you are breastfeeding.  What should I do after the exam: No restrictions, You may resume normal activities.  What is this test: A CT (computed tomography) scan is a series of pictures that allows us to look inside your body. The scanner creates images of the body in cross sections, much like slices of bread. This helps us see any problems more clearly.  Who should I call with questions: If you have any questions, please call the Imaging Department where you will have your exam. Directions, parking instructions, and other information is available on our website, [email or web address].              Future tests that were ordered for you today     Open Future Orders        Priority Expected Expires Ordered    CT Chest w/o Contrast Routine 9/19/2019 9/19/2019 9/19/2018            Who to contact     Please call your clinic at [phone] to:    Ask questions about your health    Make or cancel appointments    Discuss your medicines    Learn about your test results    Speak to your doctor            Additional Information About Your Visit        [employer] Information     [employer] gives you secure access to your electronic health record. If you see a primary care provider, you can also send messages to your care team and make appointments. If you have questions, please call your primary care clinic.  If you do not have a primary care provider, please call [phone] and they will assist you.      [employer] is an electronic gateway that provides easy, online access to your medical records. With [employer], you can request a clinic appointment, read your test results, renew a prescription or communicate with your care team.     To access your existing account, please contact your [facility] or call [phone] for  assistance.        Care EveryWhere ID     This is your Care EveryWhere ID. This could be used by other organizations to access your [place] medical records  [id number]        Your Vitals Were     Pulse Last Period                82 09/08/2004           Blood Pressure from Last 3 Encounters:   09/19/18 130/90   09/07/18 146/89   08/20/18 136/88    Weight from Last 3 Encounters:   09/07/18 86.2 kg (190 lb)   03/26/18 86.2 kg (190 lb)   01/29/18 86.2 kg (190 lb)               Primary Care Provider Office Phone # Fax #    Doug Godoy [phone] [address] [facility] [address]  [facility] [address]        Equal Access to Services     NIDHI PARKINSON : Hadii lashae lopezo Sosriali, waaxda luqadaha, qaybta kaalmada adeegyada, jarvis merino . [facility] [phone].    ATENCIÓN: Si habla español, tiene a rene disposición servicios gratuitos de asistencia lingüística. [facility] [phone].    We comply with applicable federal civil rights laws and [place] laws. We do not discriminate on the basis of race, color, national origin, age, disability, sex, sexual orientation, or gender identity.            Thank you!     Thank you for choosing [facility]  for your care. Our goal is always to provide you with excellent care. Hearing back from our patients is one way we can continue to improve our services. Please take a few minutes to complete the written survey that you may receive in the mail after your visit with us. Thank you!             Your Updated Medication List - Protect others around you: Learn how to safely use, store and throw away your medicines at www.disposemymeds.org.          This list is accurate as of 9/19/18 11:59 PM.  Always use your most recent med list.                   Brand Name Dispense Instructions for use Diagnosis    acetaminophen-codeine 300-30 MG per tablet    TYLENOL #3    15 tablet    Take 1 tablet by mouth every 6 hours as needed  for severe pain    Acute pain of left shoulder       arformoterol 15 MCG/2ML Nebu neb solution    BROVANA    120 mL    Take 2 mLs (15 mcg) by nebulization 2 times daily    Centrilobular emphysema (H)       azithromycin 250 MG tablet    ZITHROMAX    30 tablet    Take 1 tablet (250 mg) by mouth daily    Centrilobular emphysema (H)       budesonide 1 MG/2ML Susp neb solution    PULMICORT    60 ampule    Take 2 mLs (1 mg) by nebulization 2 times daily    Centrilobular emphysema (H)       calcium carbonate 600 MG tablet   Generic drug:  calcium      Take 1 tablet by mouth 2 times daily. 1-2 tablets as tolerated        Chlorpheniramine-DM 4-20 MG Tabs     84 tablet    Take 1 tablet by mouth every 6 hours as needed.    COPD (chronic obstructive pulmonary disease) (H)       Fingertip Pulse Oximeter Misc     1 each    To be used as needed - for SOB.    COPD (chronic obstructive pulmonary disease) (H), Respiratory failure with hypoxia (H)       fluticasone 50 MCG/ACT spray    FLONASE    16 g    Spray 1 spray into both nostrils 2 times daily    Centrilobular emphysema (H)       LORazepam 0.5 MG tablet    ATIVAN    90 tablet    Take 1 tablet (0.5 mg) by mouth every 8 hours as needed for anxiety    Anxiety       montelukast 10 MG tablet    SINGULAIR    30 tablet    Take 1 tablet (10 mg) by mouth every evening    Centrilobular emphysema (H)       MULTIVITAMIN TABS   OR      1 TABLET BY MOUTH DAILY        Nebulizer Compressor Kit     1 kit    1 Device 4 times daily    COPD (chronic obstructive pulmonary disease) (H)       nystatin-triamcinolone cream    MYCOLOG II    45 g    Apply  topically 2 times daily.    Skin erosion       omeprazole 20 MG CR capsule    priLOSEC    180 capsule    TAKE ONE CAPSULE BY MOUTH TWICE A DAY    Gastroesophageal reflux disease with esophagitis       order for DME      O2 at night and prn        order for DME     1 Device    Equipment being ordered: Oxygen --Please provide oxygen at 4 LPM via nasal  canula with exertion and with sleep. Can be on room air at rest. Please provide portability. Keep oxygen saturations above 90%.    COPD (chronic obstructive pulmonary disease) (H)       SENNA LAXATIVE PO      2 TABLET BY MOUTH DAILY AS NEEDED FOR CONSTIPATION        sertraline 50 MG tablet    ZOLOFT    135 tablet    TAKE ONE AND ONE-HALF TABLET BY MOUTH EVERY DAY    Major depressive disorder, single episode, mild (H)       simvastatin 20 MG tablet    ZOCOR    90 tablet    Take 1 tablet (20 mg) by mouth At Bedtime    Hyperlipidemia LDL goal <130       tiotropium 18 MCG capsule    SPIRIVA HANDIHALER    90 capsule    Inhale 1 capsule (18 mcg) into the lungs daily    Centrilobular emphysema (H)       VENTOLIN  (90 Base) MCG/ACT inhaler   Generic drug:  albuterol     54 g    INHALE 2 PUFFS BY MOUTH INTO THE LUNGS EVERY 4 HOURS AS NEEDED    COPD (chronic obstructive pulmonary disease) (H), Oral thrush       vitamin D 1000 units capsule      Take 1 capsule by mouth 2 times daily.

## 2018-09-19 NOTE — PROGRESS NOTES
Radiation Oncology Follow-up Note  DATE:2018  NAME:Mary Kay Deal   MRN: [id number]  : 1960     DISEASE TREATED:  Presumed NSCLC of the left upper lobe, [id number]     TYPE OF RADIATION THERAPY ADMINISTERED: SBRT, 50 Gy in 5 fractions to the 80% IDL    INTERVAL SINCE COMPLETION OF RADIATION THERAPY: 5 years (Completed 2013)     SUBJECTIVE:    is a 57 year old woman with history of recurrent fungal infection and non-biopsy proven stage IA lung cancer of the RUBEN. She received SBRT as described above, completed on 2013.    She had a follow up CT chest on 18. It showed stable appearance of the chest. There was no evidence of progressive or recurrent malignancy. She returns today for a routine follow up visit.On exam,Ms. Deal is overall doing well. She denies any new shortness of breath; she remains on 3L/min of oxygen via concentrator at all times, up to 3-4L with exertion and at night. She otherwise denies chest pain, cough , hemoptysis, fevers, chills, nausea, vomiting, diarrhea. Her ROS are otherwise unremarkable.     OBJECTIVE:   /90  Pulse 82  LMP 2004  GENERAL:  Appears well, in no acute distress.   CHEST: Clear bilateral breath sounds without wheezes or crackles.    IMAGING:   CT CHEST on 18:  IMPRESSION:   Stable appearance of the chest. No evidence of progressive or recurrent malignancy.    IMPRESSION: No clinical evidence for recurrent disease at this time.      RECOMMENDATIONS:  Patient will follow up with Nicole in 1 year with a repeat CT chest w/o contrast.     We spent 20 min with the patient, >50% devoted to counseling. The patient has many questions during our conversation that were answered to his satisfaction and verbalized understanding.     Ms. Muñiz was seen and discussed with the staff, Dr. Roque Bahena MD  PGY-2 Resident, Radiation Oncology  [facility]    I saw the patient with the resident.  I agree with  the resident's note and plan of care.      RENÉ Nevarez M.D.  Department of Radiation Oncology  [facility]

## 2018-10-01 ENCOUNTER — OFFICE VISIT (OUTPATIENT)
Dept: PULMONOLOGY | Facility: CLINIC | Age: 58
End: 2018-10-01
Attending: INTERNAL MEDICINE
Payer: MEDICARE

## 2018-10-01 VITALS
BODY MASS INDEX: 31.65 KG/M2 | OXYGEN SATURATION: 98 % | RESPIRATION RATE: 17 BRPM | HEART RATE: 90 BPM | HEIGHT: 65 IN | WEIGHT: 190 LBS | SYSTOLIC BLOOD PRESSURE: 125 MMHG | DIASTOLIC BLOOD PRESSURE: 87 MMHG

## 2018-10-01 DIAGNOSIS — F41.9 ANXIETY: ICD-10-CM

## 2018-10-01 DIAGNOSIS — J44.9 CHRONIC OBSTRUCTIVE PULMONARY DISEASE, UNSPECIFIED COPD TYPE (H): Primary | ICD-10-CM

## 2018-10-01 LAB
EXPTIME-PRE: 8.59 SEC
FEF2575-%PRED-PRE: 6 %
FEF2575-PRE: 0.16 L/SEC
FEF2575-PRED: 2.35 L/SEC
FEFMAX-%PRED-PRE: 19 %
FEFMAX-PRE: 1.24 L/SEC
FEFMAX-PRED: 6.48 L/SEC
FEV1-%PRED-PRE: 14 %
FEV1-PRE: 0.38 L
FEV1FEV6-PRE: 33 %
FEV1FEV6-PRED: 81 %
FEV1FVC-PRE: 27 %
FEV1FVC-PRED: 78 %
FIFMAX-PRE: 2.58 L/SEC
FVC-%PRED-PRE: 42 %
FVC-PRE: 1.39 L
FVC-PRED: 3.3 L

## 2018-10-01 PROCEDURE — G0463 HOSPITAL OUTPT CLINIC VISIT: HCPCS | Mod: ZF

## 2018-10-01 RX ORDER — LORAZEPAM 0.5 MG/1
0.5 TABLET ORAL EVERY 8 HOURS PRN
Qty: 90 TABLET | Refills: 3 | Status: SHIPPED | OUTPATIENT
Start: 2018-10-01 | End: 2019-06-10

## 2018-10-01 ASSESSMENT — PAIN SCALES - GENERAL: PAINLEVEL: MILD PAIN (2)

## 2018-10-01 NOTE — LETTER
"10/1/2018       RE: Mary Kay Deal  [address]  [place] [address]     Dear Colleague,    Thank you for referring your patient, Mary Kay Deal, to the [facility] at [facility]. Please see a copy of my visit note below.    Reason for Visit  Mary Kay Deal is a 58 year old female who is being seen for RECHECK (COPD)      Pulmonary HPI  The patient was seen and examined by Keith Mason MD     Mary Kay Deal is a 58 year old female with severe COPD on home O2. She had a Rt. UL mass showed aspergillus and was started on antifungals.  She was empirically treated with SBRT for possible lung cancer (non biopsy proven) in the left UL in 8/2013. She has multiple pulmonary nodules.      Interval History   The patient is \"okay\". Her breathing is \"about the same\". She does note a little productive cough that produces a light yellow sputum. The patient is not wheezing more than normal. She is exercising a little bit with bands to help shoulders and walks around the house. The patient will use the electric scooter when outside of the house. She does cook sometimes. No shortness of breath while laying flat, but will occasional have initial wheezing that resolves after a short time.   The patient states her sinuses are congested, which she contributes to allergies. She wakes up with her eyes full of \"crap\" and sinuses congested and contributes it to the dog. Her heartburn has improved as well as her constipation. No lower extremity edema.   The patient is using nebulizer and inhaler twice a day. No longer taking Flonase.    O2 Requirements/Use:   The patient uses continuous flow when she is at rest at home and demand flow when she is out of the house. The patient leaves it at 4 LPM at rest. While she is walking she requires 4 LPM. When she is sleeping she uses 4 LPM supplemental O2.      Current Outpatient Prescriptions   Medication     acetaminophen-codeine " (TYLENOL #3) 300-30 MG per tablet     arformoterol (BROVANA) 15 MCG/2ML NEBU neb solution     azithromycin (ZITHROMAX) 250 MG tablet     Calcium (CALCIUM 600) 600 MG tablet     Chlorpheniramine-DM 4-20 MG TABS     Cholecalciferol (VITAMIN D) 1000 UNIT capsule     fluticasone (FLONASE) 50 MCG/ACT spray     LORazepam (ATIVAN) 0.5 MG tablet     Misc. Devices (FINGERTIP PULSE OXIMETER) MISC     montelukast (SINGULAIR) 10 MG tablet     MULTIVITAMIN TABS   OR     nystatin-triamcinolone (MYCOLOG II) cream     omeprazole (PRILOSEC) 20 MG CR capsule     order for DME     ORDER FOR DME     Respiratory Therapy Supplies (NEBULIZER COMPRESSOR) KIT     SENNA LAXATIVE OR     sertraline (ZOLOFT) 50 MG tablet     simvastatin (ZOCOR) 20 MG tablet     tiotropium (SPIRIVA HANDIHALER) 18 MCG capsule     VENTOLIN  (90 Base) MCG/ACT Inhaler     budesonide (PULMICORT) 1 MG/2ML SUSP neb solution     No current facility-administered medications for this visit.        Allergies   Allergen Reactions     Penicillins Hives       Past Medical History:   Diagnosis Date     Alcohol dependence (H)     completed treatment in 1986     Aspergillosis, with pneumonia (H)      Cancer (H)     questionable lung CA     Chronic infection     Known fungal lung condition     Constipation      Disorder of bone and cartilage, unspecified      Emphysema      GERD      Hx of radiation therapy 2013    For lung mass     Mild intermittent asthma      Other dyspnea and respiratory abnormality      Other malaise and fatigue      SMOKER        Past Surgical History:   Procedure Laterality Date     BREAST SURGERY      Tumor removal at the age of 15     COLONOSCOPY N/A 8/20/2014    Procedure: COLONOSCOPY;  Surgeon: Patricia Perry MD;  Location:  OR     COLONOSCOPY N/A 9/4/2015    Procedure: COMBINED COLONOSCOPY, SINGLE OR MULTIPLE BIOPSY/POLYPECTOMY BY BIOPSY;  Surgeon: Patricia Perry MD;  Location:  GI     ENT SURGERY      Tonsillectomy     GYN  SURGERY       x 1     SURGICAL HISTORY OF -       s/p Breast tumor @ age 15 - benign     SURGICAL HISTORY OF -       c section     SURGICAL HISTORY OF -       tonsillectomy       Social History     Social History     Marital status:      Spouse name: N/A     Number of children: N/A     Years of education: N/A     Occupational History     Not on file.     Social History Main Topics     Smoking status: Former Smoker     Packs/day: 1.00     Years: 30.00     Types: Cigarettes     Quit date: 10/21/2005     Smokeless tobacco: Never Used     Alcohol use No      Comment: sober since      Drug use: No     Sexual activity: Yes     Partners: Male      Comment: vas     Other Topics Concern     Parent/Sibling W/ Cabg, Mi Or Angioplasty Before 65f 55m? Yes     Social History Narrative    , 3 children    Retired  at [employer]          The patient is currently on disability due to COPD.  She rides a scooter due to her lungs.  She used to work as a .  She and her  are living with their daughter in [place].  They have a eCare[employer]iary mix.  The patient stopped smoking 9 years ago.  She smoked 1-2 packs of cigarettes per day for 30 years, consistent with 45-60 pack year history.  She denies cigars, pipes or chewing tobacco.  She smoked marijuana in her teens.  The patient is an alcoholic and has been sober for the past 27 years.  Caffeine intake includes 4-5 cups of caffeinated coffee every morning.  She does not drink tea.  She consumes 1 can of Diet Pepsi as late as 7:00 p.m.  She eats chocolate candy bars as late as 7:00 p.m.  She does not exercise.  She has a treadmill, so she could; however, as of this time she is not exercising.                ROS Pulmonary  Constitutional- Negative. She has been trying to watch her diet and eat healthier.  Eyes- Negative.  Ear, nose and throat- Negative.  Cardiac- Negative.  Pulm- See HPI  GI- Positive. Heartburn has improved; continues  "to sleep with HOB elevation.   Genitourinary- Negative.  Musculoskeletal- Negative.   Neurology- Negative.  Dermatology- Negative.  Endocrine- Negative.  Lymphatic- Negative.  Psychiatry- Negative.  A complete ROS was otherwise negative except as noted in the HPI.      /87  Pulse 90  Resp 17  Ht 1.651 m (5' 5\")  Wt 86.2 kg (190 lb)  LMP 09/08/2004  SpO2 98%  BMI 31.62 kg/m2 on O2 NC.  Body mass index is 31.62 kg/(m^2).   Physical Exam  GENERAL APPEARANCE: Well developed, well nourished, alert, and in no apparent distress.  EYES: PERRL, EOMI  HENT: Nasal mucosa with no edema and no hyperemia. No nasal polyps.  EARS: Canals clear, TMs normal.  MOUTH: Oral mucosa is moist, without any lesions, no tonsillar enlargement, no oropharyngeal exudate.  NECK: Supple, no masses, no thyromegaly.  LYMPHATICS: No significant axillary, cervical, or supraclavicular nodes.  RESP: Normal percussion, very diminished air flow throughout. No crackles. No rhonchi. No wheezes.  CV: Normal S1, S2, regular rhythm, normal rate. No murmur. No rub. No gallop. No LE edema.      Results  Recent Results (from the past 168 hour(s))   General PFT Lab (Please always keep checked)    Collection Time: 10/01/18 11:07 AM   Result Value Ref Range    FVC-Pred 3.30 L    FVC-Pre 1.39 L    FVC-%Pred-Pre 42 %    FEV1-Pre 0.38 L    FEV1-%Pred-Pre 14 %    FEV1FVC-Pred 78 %    FEV1FVC-Pre 27 %    FEFMax-Pred 6.48 L/sec    FEFMax-Pre 1.24 L/sec    FEFMax-%Pred-Pre 19 %    FEF2575-Pred 2.35 L/sec    FEF2575-Pre 0.16 L/sec    IMU1600-%Pred-Pre 6 %    ExpTime-Pre 8.59 sec    FIFMax-Pre 2.58 L/sec    FEV1FEV6-Pred 81 %    FEV1FEV6-Pre 33 %                 Results as noted above.    PFT Interpretation:  Very severe obstructive ventilatory defect.  Unchanged from previous.   Similar to recent best.  Valid Maneuver    Chest CT (9/17/18): no evidence of malignancy.      Assessment and Plan: Mary Kay Deal is a 58-year-old female has a longstanding history of " severe obstructive lung disease who comes today to the clinic for follow up.    1. RUL Mass /Aspergillus:  This lesion was biopsied by IR and it showed aspergillus and was treated with one month course of voriconazole. Due to lack of improvement it was stopped as per patient. This has remained stable. This was followed/managed by Dr. Pierre.    2. Emphysema: Very severe lung disease.  - Will cont O2 at night and with activity.  6MWT (1/22/2016): Walked 460 ft on 4 lpm NC. Hence recommended to use this during the day with exertion and while sleeping. Can be on RA at rest.   - Will cont flu vaccine every year and has received pneumonia vaccine 5 years apart x2 doses. Received PCV 13 12/5/2014.  - Cont spiriva and prn nebs/inhalers. Cont Budesonide/Brovana nebs.  - Cont azithromycin daily. Will follow Qtc prn --457msec on 6/13/2015  - She would benefit from doing outpt pulm rehab but she is unable to do it due to lack of transportation. She has a treadmill at home and is currently using it to exercise.  - Cont prn lorazepam.  - Cont singular daily.   3/26/2018: FEV1 relatively stable. Symptoms are stable. She has not had any progression of pulmonary nodules since 5 years ago. She was encouraged to lose weight.  10/1/2018: FEV1 today (0.38) relatively stable from previous evaluation (0.41). Her pulmonary symptoms are stable. She leaves her oxygen at 4 LPM. Encouraged her to be more active to help with pulmonary function.  Consideration for Lung Transplant: Given her lung function, she may be candidate for a lung transplant. Will discuss her case at conference. If deemed an appropriate candidate will have her call the referral line and initiate the lung tx work up process.    Insomnia: Sleep study - no MADELIN noted (5/2014).    3. Pulmonary Nodules:    - Chest CT done on 08/3/12 showed a new left UL nodule that increased in size when followed. This was reviewed at Pulmonary nodule conference and given the risk of doing  biopsy and after seeing Dr. Lorenzo she underwent SBRT. SBRT 5000cGy in 5 fractions over 3 months - last dose on 08/29/13.  Last Chest CT was in 9/2018 - no evidence of malignancy.  --  She is followed by Radiation oncology and they will continue to determine appropriate imaging follow up. She will get a Chest CT in one month. We will at the minimum need yearly Chest CT scans.  10/1/2018: CT of chest on 9/17/2018 was stable and revealed no evidence of progressive or recurrent malignancy. As per pt Rad oncology would only do Chest CT annually.    4. Allergies:   - Cont flonase 1 spray each nostril BID.   - To do NetiPot (nasal rinses) PRN. Patient has not started nasal rinses.   - Cont Singulair.  10/1/2018: No longer taking Flonase. She is may be taking the generic version of Singulair, but is unsure. She does note sinus congestion as well as dry eyes in the morning.    5. Obesity:   -  Strongly advised to loose weight.   - Encouraged patient to eat healthier and gradually increase her exercise for weight loss.  10/1/2018: Encouraged the patient to increase her exercise and monitor her diet for weight loss.    6. GERD:   - Controlled on PPI at 20mg daily.   - Seen by GI for this and constipation. Recommended FODMAP diet trial, which pt has not instituted yet.   - She had some dysphagia (pills getting stuck) hence had esophagagram on 1/2018 which showed mild dysmotility. Recommended by Dr. Godoy to follow up with GI.  3/26/2018: She was instructed to take Prilosec 20 mg BID to better control her GERD.  10/1/2018: Heartburn has improved.    7. H/O Presumed Lung CA ([id number]): Continue follow up with Radiation oncology, they will decide about treatment regimen and CT's. This was Left UL lesion.  - TYPE OF RADIOTHERAPY GIVEN: SBRT 5000cGy in 5 fractions, 6MV, 80%IDL    8. Health Care Maintenance:  10/1/2018: She will receive her influenza vaccination for 2018/2019 season in the near future. Consider Shingrix and  TDaP vaccinations in the future.      F/U in 6 months with spirometry. Will try to coordinate appointment with oncology (Tuesdays).      Scribe Disclosure:   I, Stephan Jang, am serving as a scribe; to document services personally performed by Keith Mason MD based on data collection and the provider's statements to me.     Provider Disclosure:  I agree with above History, Review of Systems, Physical exam and Plan. I have reviewed the content of the documentation and have edited it as needed. I have personally performed the services documented here and the documentation accurately represents those services and the decisions I have made.      Electronically signed by:  Keith Mason MD.

## 2018-10-01 NOTE — MR AVS SNAPSHOT
After Visit Summary   10/1/2018    Mary Kay Deal    MRN: [id number]           Patient Information     Date Of Birth          1960        Visit Information        Provider Department      10/1/2018 11:20 AM Keith Mason MD [facility]        Today's Diagnoses     Chronic obstructive pulmonary disease, unspecified COPD type (H)    -  1       Follow-ups after your visit        Follow-up notes from your care team     Return in about 6 months (around 4/1/2019).      Your next 10 appointments already scheduled     Apr 01, 2019 11:00 AM CDT   PFT VISIT with  PFL B   [facility] Pulmonary Function Testing ([facility])    [address]  3rd Floor  [place] [address]   [phone]            Apr 01, 2019 11:20 AM CDT   (Arrive by 11:05 AM)   Return Visit with Keith Mason MD   [facility] ([facility])    [address]  [address]  [place] [address]   [phone]            Sep 16, 2019 12:00 PM CDT   CT CHEST W/O CONTRAST with CT2   [facility] [place] [facility] ([facility])    [address]  1st Floor  [place] [address] [phone]           How do I prepare for my exam? (Food and drink instructions) No Food and Drink Restrictions.  How do I prepare for my exam? (Other instructions) You do not need to do anything special to prepare for this exam. For a sinus scan: Use your nose spray (nasal decongestant spray) as directed.  What should I wear: Please wear loose clothing, such as a sweat suit or jogging clothes. Avoid snaps, zippers and other metal. We may ask you to undress and put on a hospital gown.  How long does the exam take: Most scans take less than 20 minutes.  What should I bring: Please bring any scans or X-rays taken at other hospitals, if similar tests were done. Also bring a list of your medicines,  including vitamins, minerals and over-the-counter drugs. It is safest to leave personal items at home.  Do I need a : No  is needed.  What do I need to tell my doctor? Be sure to tell your doctor: * If you have any allergies. * If there s any chance you are pregnant. * If you are breastfeeding.  What should I do after the exam: No restrictions, You may resume normal activities.  What is this test: A CT (computed tomography) scan is a series of pictures that allows us to look inside your body. The scanner creates images of the body in cross sections, much like slices of bread. This helps us see any problems more clearly.  Who should I call with questions: If you have any questions, please call the Imaging Department where you will have your exam. Directions, parking instructions, and other information is available on our website, [email or web address].            Sep 16, 2019  1:00 PM CDT   TCT/SIM Suite Visit with MOE Buchanan CNP   Radiation Oncology Clinic ([facility])    [facility]  1st Floor  [address] [address] [phone]              Who to contact     If you have questions or need follow up information about today's clinic visit or your schedule please contact [facility] directly at [phone].  Normal or non-critical lab and imaging results will be communicated to you by MyChart, letter or phone within 4 business days after the clinic has received the results. If you do not hear from us within 7 days, please contact the clinic through MyChart or phone. If you have a critical or abnormal lab result, we will notify you by phone as soon as possible.  Submit refill requests through [facility] or call your pharmacy and they will forward the refill request to us. Please allow 3 business days for your refill to be completed.          Additional Information About Your Visit        [facility]  "Information     [facility] gives you secure access to your electronic health record. If you see a primary care provider, you can also send messages to your care team and make appointments. If you have questions, please call your primary care clinic.  If you do not have a primary care provider, please call [phone] and they will assist you.        Care EveryWhere ID     This is your Care EveryWhere ID. This could be used by other organizations to access your [facility] medical records  [id number]        Your Vitals Were     Pulse Respirations Height Last Period Pulse Oximetry BMI (Body Mass Index)    90 17 1.651 m (5' 5\") 09/08/2004 98% 31.62 kg/m2       Blood Pressure from Last 3 Encounters:   No data found for BP    Weight from Last 3 Encounters:   No data found for Wt              Today, you had the following     No orders found for display         Where to get your medicines      Some of these will need a paper prescription and others can be bought over the counter.  Ask your nurse if you have questions.     Bring a paper prescription for each of these medications     LORazepam 0.5 MG tablet          Primary Care Provider Office Phone # Fax #    Doug Godoy [phone] [address] [facility] [address]  [facility] [address]        Equal Access to Services     NIDHI PARKINSON : Hadii lashae lopezo Sorl, waaxda luqadaha, qaybta kaalmada adeegyada, jarvis cross. So [facility] [phone].    ATENCIÓN: Si habla español, tiene a rene disposición servicios gratsimonaos de asistencia lingüística. Llame al [phone].    We comply with applicable federal civil rights laws and [place] laws. We do not discriminate on the basis of race, color, national origin, age, disability, sex, sexual orientation, or gender identity.            Thank you!     Thank you for choosing [facility]  for your care. Our goal is always to provide you with excellent " care. Hearing back from our patients is one way we can continue to improve our services. Please take a few minutes to complete the written survey that you may receive in the mail after your visit with us. Thank you!             Your Updated Medication List - Protect others around you: Learn how to safely use, store and throw away your medicines at www.disposemymeds.org.          This list is accurate as of 10/1/18 11:59 PM.  Always use your most recent med list.                   Brand Name Dispense Instructions for use Diagnosis    acetaminophen-codeine 300-30 MG per tablet    TYLENOL #3    15 tablet    Take 1 tablet by mouth every 6 hours as needed for severe pain    Acute pain of left shoulder       arformoterol 15 MCG/2ML Nebu neb solution    BROVANA    120 mL    Take 2 mLs (15 mcg) by nebulization 2 times daily    Centrilobular emphysema (H)       azithromycin 250 MG tablet    ZITHROMAX    30 tablet    Take 1 tablet (250 mg) by mouth daily    Centrilobular emphysema (H)       budesonide 1 MG/2ML Susp neb solution    PULMICORT    60 ampule    Take 2 mLs (1 mg) by nebulization 2 times daily    Centrilobular emphysema (H)       calcium carbonate 600 MG tablet   Generic drug:  calcium      Take 1 tablet by mouth 2 times daily. 1-2 tablets as tolerated        Chlorpheniramine-DM 4-20 MG Tabs     84 tablet    Take 1 tablet by mouth every 6 hours as needed.    COPD (chronic obstructive pulmonary disease) (H)       Fingertip Pulse Oximeter Misc     1 each    To be used as needed - for SOB.    COPD (chronic obstructive pulmonary disease) (H), Respiratory failure with hypoxia (H)       fluticasone 50 MCG/ACT spray    FLONASE    16 g    Spray 1 spray into both nostrils 2 times daily    Centrilobular emphysema (H)       LORazepam 0.5 MG tablet    ATIVAN    90 tablet    Take 1 tablet (0.5 mg) by mouth every 8 hours as needed for anxiety    Anxiety       montelukast 10 MG tablet    SINGULAIR    30 tablet    Take 1 tablet (10  mg) by mouth every evening    Centrilobular emphysema (H)       MULTIVITAMIN TABS   OR      1 TABLET BY MOUTH DAILY        Nebulizer Compressor Kit     1 kit    1 Device 4 times daily    COPD (chronic obstructive pulmonary disease) (H)       nystatin-triamcinolone cream    MYCOLOG II    45 g    Apply  topically 2 times daily.    Skin erosion       omeprazole 20 MG CR capsule    priLOSEC    180 capsule    TAKE ONE CAPSULE BY MOUTH TWICE A DAY    Gastroesophageal reflux disease with esophagitis       order for DME      O2 at night and prn        order for DME     1 Device    Equipment being ordered: Oxygen --Please provide oxygen at 4 LPM via nasal canula with exertion and with sleep. Can be on room air at rest. Please provide portability. Keep oxygen saturations above 90%.    COPD (chronic obstructive pulmonary disease) (H)       SENNA LAXATIVE PO      2 TABLET BY MOUTH DAILY AS NEEDED FOR CONSTIPATION        sertraline 50 MG tablet    ZOLOFT    135 tablet    TAKE ONE AND ONE-HALF TABLET BY MOUTH EVERY DAY    Major depressive disorder, single episode, mild (H)       simvastatin 20 MG tablet    ZOCOR    90 tablet    Take 1 tablet (20 mg) by mouth At Bedtime    Hyperlipidemia LDL goal <130       tiotropium 18 MCG capsule    SPIRIVA HANDIHALER    90 capsule    Inhale 1 capsule (18 mcg) into the lungs daily    Centrilobular emphysema (H)       VENTOLIN  (90 Base) MCG/ACT inhaler   Generic drug:  albuterol     54 g    INHALE 2 PUFFS BY MOUTH INTO THE LUNGS EVERY 4 HOURS AS NEEDED    COPD (chronic obstructive pulmonary disease) (H), Oral thrush       vitamin D 1000 units capsule      Take 1 capsule by mouth 2 times daily.

## 2018-10-01 NOTE — PROGRESS NOTES
"Reason for Visit  Mary Kay Deal is a 58 year old female who is being seen for RECHECK (COPD)      Pulmonary HPI  The patient was seen and examined by Keith Mason MD     Mary Kay Deal is a 58 year old female with severe COPD on home O2. She had a Rt. UL mass showed aspergillus and was started on antifungals.  She was empirically treated with SBRT for possible lung cancer (non biopsy proven) in the left UL in 8/2013. She has multiple pulmonary nodules.      Interval History   The patient is \"okay\". Her breathing is \"about the same\". She does note a little productive cough that produces a light yellow sputum. The patient is not wheezing more than normal. She is exercising a little bit with bands to help shoulders and walks around the house. The patient will use the electric scooter when outside of the house. She does cook sometimes. No shortness of breath while laying flat, but will occasional have initial wheezing that resolves after a short time.   The patient states her sinuses are congested, which she contributes to allergies. She wakes up with her eyes full of \"crap\" and sinuses congested and contributes it to the dog. Her heartburn has improved as well as her constipation. No lower extremity edema.   The patient is using nebulizer and inhaler twice a day. No longer taking Flonase.    O2 Requirements/Use:   The patient uses continuous flow when she is at rest at home and demand flow when she is out of the house. The patient leaves it at 4 LPM at rest. While she is walking she requires 4 LPM. When she is sleeping she uses 4 LPM supplemental O2.      Current Outpatient Prescriptions   Medication     acetaminophen-codeine (TYLENOL #3) 300-30 MG per tablet     arformoterol (BROVANA) 15 MCG/2ML NEBU neb solution     azithromycin (ZITHROMAX) 250 MG tablet     Calcium (CALCIUM 600) 600 MG tablet     Chlorpheniramine-DM 4-20 MG TABS     Cholecalciferol (VITAMIN D) 1000 UNIT capsule     fluticasone (FLONASE) 50 " MCG/ACT spray     LORazepam (ATIVAN) 0.5 MG tablet     Misc. Devices (FINGERTIP PULSE OXIMETER) MISC     montelukast (SINGULAIR) 10 MG tablet     MULTIVITAMIN TABS   OR     nystatin-triamcinolone (MYCOLOG II) cream     omeprazole (PRILOSEC) 20 MG CR capsule     order for DME     ORDER FOR DME     Respiratory Therapy Supplies (NEBULIZER COMPRESSOR) KIT     SENNA LAXATIVE OR     sertraline (ZOLOFT) 50 MG tablet     simvastatin (ZOCOR) 20 MG tablet     tiotropium (SPIRIVA HANDIHALER) 18 MCG capsule     VENTOLIN  (90 Base) MCG/ACT Inhaler     budesonide (PULMICORT) 1 MG/2ML SUSP neb solution     No current facility-administered medications for this visit.        Allergies   Allergen Reactions     Penicillins Hives       Past Medical History:   Diagnosis Date     Alcohol dependence (H)     completed treatment in      Aspergillosis, with pneumonia (H)      Cancer (H)     questionable lung CA     Chronic infection     Known fungal lung condition     Constipation      Disorder of bone and cartilage, unspecified      Emphysema      GERD      Hx of radiation therapy     For lung mass     Mild intermittent asthma      Other dyspnea and respiratory abnormality      Other malaise and fatigue      SMOKER        Past Surgical History:   Procedure Laterality Date     BREAST SURGERY      Tumor removal at the age of 15     COLONOSCOPY N/A 2014    Procedure: COLONOSCOPY;  Surgeon: Patricia Perry MD;  Location:  OR     COLONOSCOPY N/A 2015    Procedure: COMBINED COLONOSCOPY, SINGLE OR MULTIPLE BIOPSY/POLYPECTOMY BY BIOPSY;  Surgeon: Patricia Perry MD;  Location:  GI     ENT SURGERY      Tonsillectomy     GYN SURGERY       x 1     SURGICAL HISTORY OF -       s/p Breast tumor @ age 15 - benign     SURGICAL HISTORY OF -       c section     SURGICAL HISTORY OF -       tonsillectomy       Social History     Social History     Marital status:      Spouse name: N/A     Number of children:  N/A     Years of education: N/A     Occupational History     Not on file.     Social History Main Topics     Smoking status: Former Smoker     Packs/day: 1.00     Years: 30.00     Types: Cigarettes     Quit date: 10/21/2005     Smokeless tobacco: Never Used     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     Drug use: No     Sexual activity: Yes     Partners: Male      Comment: vas     Other Topics Concern     Parent/Sibling W/ Cabg, Mi Or Angioplasty Before 65f 55m? Yes     Social History Narrative    , 3 children    Retired  at [employer]          The patient is currently on disability due to COPD.  She rides a scooter due to her lungs.  She used to work as a .  She and her  are living with their daughter in [place].  They have a [employer] mix.  The patient stopped smoking 9 years ago.  She smoked 1-2 packs of cigarettes per day for 30 years, consistent with 45-60 pack year history.  She denies cigars, pipes or chewing tobacco.  She smoked marijuana in her teens.  The patient is an alcoholic and has been sober for the past 27 years.  Caffeine intake includes 4-5 cups of caffeinated coffee every morning.  She does not drink tea.  She consumes 1 can of Diet Pepsi as late as 7:00 p.m.  She eats chocolate candy bars as late as 7:00 p.m.  She does not exercise.  She has a treadmill, so she could; however, as of this time she is not exercising.                ROS Pulmonary  Constitutional- Negative. She has been trying to watch her diet and eat healthier.  Eyes- Negative.  Ear, nose and throat- Negative.  Cardiac- Negative.  Pulm- See HPI  GI- Positive. Heartburn has improved; continues to sleep with HOB elevation.   Genitourinary- Negative.  Musculoskeletal- Negative.   Neurology- Negative.  Dermatology- Negative.  Endocrine- Negative.  Lymphatic- Negative.  Psychiatry- Negative.  A complete ROS was otherwise negative except as noted in the HPI.      /87  Pulse 90   "Resp 17  Ht 1.651 m (5' 5\")  Wt 86.2 kg (190 lb)  LMP 09/08/2004  SpO2 98%  BMI 31.62 kg/m2 on O2 NC.  Body mass index is 31.62 kg/(m^2).   Physical Exam  GENERAL APPEARANCE: Well developed, well nourished, alert, and in no apparent distress.  EYES: PERRL, EOMI  HENT: Nasal mucosa with no edema and no hyperemia. No nasal polyps.  EARS: Canals clear, TMs normal.  MOUTH: Oral mucosa is moist, without any lesions, no tonsillar enlargement, no oropharyngeal exudate.  NECK: Supple, no masses, no thyromegaly.  LYMPHATICS: No significant axillary, cervical, or supraclavicular nodes.  RESP: Normal percussion, very diminished air flow throughout. No crackles. No rhonchi. No wheezes.  CV: Normal S1, S2, regular rhythm, normal rate. No murmur. No rub. No gallop. No LE edema.      Results  Recent Results (from the past 168 hour(s))   General PFT Lab (Please always keep checked)    Collection Time: 10/01/18 11:07 AM   Result Value Ref Range    FVC-Pred 3.30 L    FVC-Pre 1.39 L    FVC-%Pred-Pre 42 %    FEV1-Pre 0.38 L    FEV1-%Pred-Pre 14 %    FEV1FVC-Pred 78 %    FEV1FVC-Pre 27 %    FEFMax-Pred 6.48 L/sec    FEFMax-Pre 1.24 L/sec    FEFMax-%Pred-Pre 19 %    FEF2575-Pred 2.35 L/sec    FEF2575-Pre 0.16 L/sec    OBX2196-%Pred-Pre 6 %    ExpTime-Pre 8.59 sec    FIFMax-Pre 2.58 L/sec    FEV1FEV6-Pred 81 %    FEV1FEV6-Pre 33 %                 Results as noted above.    PFT Interpretation:  Very severe obstructive ventilatory defect.  Unchanged from previous.   Similar to recent best.  Valid Maneuver    Chest CT (9/17/18): no evidence of malignancy.      Assessment and Plan: Mary Kay Deal is a 58-year-old female has a longstanding history of severe obstructive lung disease who comes today to the clinic for follow up.    1. RUL Mass /Aspergillus:  This lesion was biopsied by IR and it showed aspergillus and was treated with one month course of voriconazole. Due to lack of improvement it was stopped as per patient. This has remained " stable. This was followed/managed by Dr. Pierre.    2. Emphysema: Very severe lung disease.  - Will cont O2 at night and with activity.  6MWT (1/22/2016): Walked 460 ft on 4 lpm NC. Hence recommended to use this during the day with exertion and while sleeping. Can be on RA at rest.   - Will cont flu vaccine every year and has received pneumonia vaccine 5 years apart x2 doses. Received PCV 13 12/5/2014.  - Cont spiriva and prn nebs/inhalers. Cont Budesonide/Brovana nebs.  - Cont azithromycin daily. Will follow Qtc prn --457msec on 6/13/2015  - She would benefit from doing outpt pulm rehab but she is unable to do it due to lack of transportation. She has a treadmill at home and is currently using it to exercise.  - Cont prn lorazepam.  - Cont singular daily.   3/26/2018: FEV1 relatively stable. Symptoms are stable. She has not had any progression of pulmonary nodules since 5 years ago. She was encouraged to lose weight.  10/1/2018: FEV1 today (0.38) relatively stable from previous evaluation (0.41). Her pulmonary symptoms are stable. She leaves her oxygen at 4 LPM. Encouraged her to be more active to help with pulmonary function.  Consideration for Lung Transplant: Given her lung function, she may be candidate for a lung transplant. Will discuss her case at conference. If deemed an appropriate candidate will have her call the referral line and initiate the lung tx work up process.    Insomnia: Sleep study - no MADELIN noted (5/2014).    3. Pulmonary Nodules:    - Chest CT done on 08/3/12 showed a new left UL nodule that increased in size when followed. This was reviewed at Pulmonary nodule conference and given the risk of doing biopsy and after seeing Dr. Lorenzo she underwent SBRT. SBRT 5000cGy in 5 fractions over 3 months - last dose on 08/29/13.  Last Chest CT was in 9/2018 - no evidence of malignancy.  --  She is followed by Radiation oncology and they will continue to determine appropriate imaging follow up. She  will get a Chest CT in one month. We will at the minimum need yearly Chest CT scans.  10/1/2018: CT of chest on 9/17/2018 was stable and revealed no evidence of progressive or recurrent malignancy. As per pt Rad oncology would only do Chest CT annually.    4. Allergies:   - Cont flonase 1 spray each nostril BID.   - To do NetiPot (nasal rinses) PRN. Patient has not started nasal rinses.   - Cont Singulair.  10/1/2018: No longer taking Flonase. She is may be taking the generic version of Singulair, but is unsure. She does note sinus congestion as well as dry eyes in the morning.    5. Obesity:   -  Strongly advised to loose weight.   - Encouraged patient to eat healthier and gradually increase her exercise for weight loss.  10/1/2018: Encouraged the patient to increase her exercise and monitor her diet for weight loss.    6. GERD:   - Controlled on PPI at 20mg daily.   - Seen by GI for this and constipation. Recommended FODMAP diet trial, which pt has not instituted yet.   - She had some dysphagia (pills getting stuck) hence had esophagagram on 1/2018 which showed mild dysmotility. Recommended by Dr. Godoy to follow up with GI.  3/26/2018: She was instructed to take Prilosec 20 mg BID to better control her GERD.  10/1/2018: Heartburn has improved.    7. H/O Presumed Lung CA ([id number]): Continue follow up with Radiation oncology, they will decide about treatment regimen and CT's. This was Left UL lesion.  - TYPE OF RADIOTHERAPY GIVEN: SBRT 5000cGy in 5 fractions, 6MV, 80%IDL    8. Health Care Maintenance:  10/1/2018: She will receive her influenza vaccination for 2018/2019 season in the near future. Consider Shingrix and TDaP vaccinations in the future.      F/U in 6 months with spirometry. Will try to coordinate appointment with oncology (Tuesdays).      Scribe Disclosure:   Stephan CALDERON, am serving as a scribe; to document services personally performed by Keith Mason MD based on data collection and the  provider's statements to me.     Provider Disclosure:  I agree with above History, Review of Systems, Physical exam and Plan. I have reviewed the content of the documentation and have edited it as needed. I have personally performed the services documented here and the documentation accurately represents those services and the decisions I have made.      Electronically signed by:  Keith Mason MD.

## 2018-10-01 NOTE — TELEPHONE ENCOUNTER
Per pharmacy - last filled #90 5/8/18    Pending Prescriptions:                       Disp   Refills    LORazepam (ATIVAN) 0.5 MG tablet          90 tab*3            Sig: Take 1 tablet (0.5 mg) by mouth every 8 hours as           needed for anxiety          Last Written Prescription Date:  11-17-17  Last Fill Quantity: 90,   # refills: 3  Last Office Visit: 9-7-18 Jean Paullibby  Future Office visit:       Routing refill request to provider for review/approval because:  Drug not on the FM, P or [facility] refill protocol or controlled substance    Judy De La Cruz RT (R)

## 2018-10-12 DIAGNOSIS — J44.9 CHRONIC OBSTRUCTIVE PULMONARY DISEASE, UNSPECIFIED COPD TYPE (H): ICD-10-CM

## 2018-10-12 RX ORDER — PREDNISONE 10 MG/1
TABLET ORAL
Qty: 23 TABLET | Refills: 1 | Status: SHIPPED | OUTPATIENT
Start: 2018-10-12 | End: 2019-06-07

## 2018-10-12 NOTE — TELEPHONE ENCOUNTER
Contacted patient to inquire on symptoms as received rx to refill Prednisone taper. Patient reports 2 weeks of SOB, lung congestion, white sputum and cough. Denies fevers. Has Doxycycline on hand. Refilled Prednisone taper. Explained to patient to take all medication together and return call to clinic with status update. Patient stated she would begin Doxy and Prednisone and call clinic with update.

## 2018-10-22 NOTE — TELEPHONE ENCOUNTER
Patient contacted clinic with update on her symptoms. She has been taking the prescribed medication and is feeling much better. I told her that I would relay the information to Cecelia (ARIES) when she is back in clinic tomorrow.

## 2018-10-23 ENCOUNTER — TELEPHONE (OUTPATIENT)
Dept: PULMONOLOGY | Facility: CLINIC | Age: 58
End: 2018-10-23

## 2018-10-23 NOTE — TELEPHONE ENCOUNTER
Contacted by patient to state that she is back to baseline after being treated on 10/12/18 by Dr Mason.

## 2018-11-11 ENCOUNTER — HEALTH MAINTENANCE LETTER (OUTPATIENT)
Age: 58
End: 2018-11-11

## 2018-11-12 NOTE — PROGRESS NOTES
Subjective:  HPI                    Objective:  System    Physical Exam    General     ROS    Assessment/Plan:    DISCHARGE REPORT    Progress reporting period is from 9/17/18      SUBJECTIVE  Subjective:  See initial evaluation   Current Pain level: 2/10.     Initial Pain level: 2/10.   Changes in function:  None  Adverse reaction to treatment or activity: None    OBJECTIVE  Changes noted in objective findings:  None  Objective: See Initial Evaluation    ASSESSMENT/PLAN  Updated problem list and treatment plan: Diagnosis 1:  Left shoulder pain  Pain -  self management, directional preference exercise and home program  Decreased ROM/flexibility - manual therapy and therapeutic exercise  Decreased strength - therapeutic exercise and therapeutic activities  Impaired muscle performance - neuro re-education  Decreased function - therapeutic activities  Impaired posture - neuro re-education  STG/LTGs have been met or progress has been made towards goals:  None  Assessment of Progress: Patient has not returned to therapy.  Current status is unknown and discharge G code cannot be reported.  Self Management Plans:  Patient has been instructed in a home treatment program.  Patient has not returned to physical therapy and has not returned our phone call.  Our certification date was not extended and the patient has not returned to skilled physical therapy for skilled care to address her shoulder so we were unable to reassess her progress. Patient has not made progress towards goals.  Mary Kay continues to require the following intervention to meet STG and LTG's:  PT intervention is no longer required to meet STG/LTG.    Recommendations:  This patient is ready to be discharged from therapy and continue their home treatment program. We would be happy to work with the patient if she were to return to physical therapy.      Please refer to the daily flowsheet for treatment today, total treatment time and time spent performing 1:1  timed codes.

## 2018-12-03 ENCOUNTER — DOCUMENTATION ONLY (OUTPATIENT)
Dept: PULMONOLOGY | Facility: CLINIC | Age: 58
End: 2018-12-03

## 2018-12-03 NOTE — PROGRESS NOTES
Neb Kit Rx signed by Dr. Mason and faxed to [facility] [phone] 12/3 @ 11:52 am

## 2019-01-04 PROBLEM — M25.512 LEFT SHOULDER PAIN: Status: RESOLVED | Noted: 2018-09-17 | Resolved: 2019-01-04

## 2019-02-13 ENCOUNTER — OFFICE VISIT (OUTPATIENT)
Dept: URGENT CARE | Facility: URGENT CARE | Age: 59
End: 2019-02-13
Payer: MEDICARE

## 2019-02-13 ENCOUNTER — ANCILLARY PROCEDURE (OUTPATIENT)
Dept: GENERAL RADIOLOGY | Facility: CLINIC | Age: 59
End: 2019-02-13
Attending: FAMILY MEDICINE
Payer: MEDICARE

## 2019-02-13 VITALS
SYSTOLIC BLOOD PRESSURE: 149 MMHG | WEIGHT: 190 LBS | DIASTOLIC BLOOD PRESSURE: 95 MMHG | BODY MASS INDEX: 31.62 KG/M2 | OXYGEN SATURATION: 98 % | HEART RATE: 97 BPM | TEMPERATURE: 98.1 F | RESPIRATION RATE: 16 BRPM

## 2019-02-13 DIAGNOSIS — J44.9 CHRONIC OBSTRUCTIVE PULMONARY DISEASE, UNSPECIFIED COPD TYPE (H): ICD-10-CM

## 2019-02-13 DIAGNOSIS — R07.81 RIB PAIN ON RIGHT SIDE: ICD-10-CM

## 2019-02-13 DIAGNOSIS — R10.11 RUQ ABDOMINAL PAIN: Primary | ICD-10-CM

## 2019-02-13 PROCEDURE — 99214 OFFICE O/P EST MOD 30 MIN: CPT | Performed by: FAMILY MEDICINE

## 2019-02-13 PROCEDURE — 71101 X-RAY EXAM UNILAT RIBS/CHEST: CPT | Mod: RT

## 2019-02-14 NOTE — PROGRESS NOTES
SUBJECTIVE:   Mary Kay Deal is a 58 year old female with history of alcohol dependence, emphysema, GERD, mild intermittent asthma, patient is on 4 L of oxygen at home presenting with a chief complaint of chest tightness and right upper quadrant pain and right lower rib pain.  She has been noticing the pain for last 3 days and thinks there is association with the kind of food she is eating.  Mostly the pain is with eating fatty food.  Patient has also been noticing more chest tightness and pain in the right upper quadrant area with coughing.  She denies any fever chills or any acute chest pain.  Patient is usually on 4 L of oxygen all the time and her saturation today is 98%. She is an established patient of [facility].  Onset of symptoms was 3 day(s) ago.  Course of illness is worsening.    Severity moderate  Current and Associated symptoms: shortness of breath  Treatment measures tried include Inhaler   Predisposing factors include HX of COPD.    Past Medical History:   Diagnosis Date     Alcohol dependence (H)     completed treatment in 1986     Aspergillosis, with pneumonia (H)      Cancer (H)     questionable lung CA     Chronic infection     Known fungal lung condition     Constipation      Disorder of bone and cartilage, unspecified      Emphysema      GERD      Hx of radiation therapy 2013    For lung mass     Mild intermittent asthma      Other dyspnea and respiratory abnormality      Other malaise and fatigue      SMOKER      Current Outpatient Medications   Medication Sig Dispense Refill     acetaminophen-codeine (TYLENOL #3) 300-30 MG per tablet Take 1 tablet by mouth every 6 hours as needed for severe pain 15 tablet 0     arformoterol (BROVANA) 15 MCG/2ML NEBU neb solution Take 2 mLs (15 mcg) by nebulization 2 times daily 120 mL 11     azithromycin (ZITHROMAX) 250 MG tablet Take 1 tablet (250 mg) by mouth daily 30 tablet 11     Calcium (CALCIUM 600) 600 MG tablet Take 1 tablet by mouth 2 times daily. 1-2  tablets as tolerated       Chlorpheniramine-DM 4-20 MG TABS Take 1 tablet by mouth every 6 hours as needed. 84 tablet 3     Cholecalciferol (VITAMIN D) 1000 UNIT capsule Take 1 capsule by mouth 2 times daily.       fluticasone (FLONASE) 50 MCG/ACT spray Spray 1 spray into both nostrils 2 times daily 16 g 11     LORazepam (ATIVAN) 0.5 MG tablet Take 1 tablet (0.5 mg) by mouth every 8 hours as needed for anxiety 90 tablet 3     Misc. Devices (FINGERTIP PULSE OXIMETER) MISC To be used as needed - for SOB. 1 each 0     montelukast (SINGULAIR) 10 MG tablet Take 1 tablet (10 mg) by mouth every evening 30 tablet 11     MULTIVITAMIN TABS   OR 1 TABLET BY MOUTH DAILY       nystatin-triamcinolone (MYCOLOG II) cream Apply  topically 2 times daily. 45 g 1     omeprazole (PRILOSEC) 20 MG DR capsule TAKE ONE CAPSULE BY MOUTH TWICE A  capsule 3     order for DME Equipment being ordered: Oxygen --Please provide oxygen at 4 LPM via nasal canula with exertion and with sleep. Can be on room air at rest. Please provide portability. Keep oxygen saturations above 90%. 1 Device 1     ORDER FOR DME O2 at night and prn       predniSONE (DELTASONE) 10 MG tablet TAKE 4 TABLETS BY MOUTH ONCE DAILY FOR 2 DAYS THEN TAKE 3 TABLETS FOR 2 DAYS THEN 2 TABLET FOR 3 DAYS THEN 1 TABLET FOR 3 DAYS 23 tablet 1     Respiratory Therapy Supplies (NEBULIZER COMPRESSOR) KIT 1 Device 4 times daily 1 kit 1     SENNA LAXATIVE OR 2 TABLET BY MOUTH DAILY AS NEEDED FOR CONSTIPATION       sertraline (ZOLOFT) 50 MG tablet TAKE ONE AND ONE-HALF TABLET BY MOUTH EVERY  tablet 3     simvastatin (ZOCOR) 20 MG tablet Take 1 tablet (20 mg) by mouth At Bedtime 90 tablet 3     tiotropium (SPIRIVA HANDIHALER) 18 MCG capsule Inhale 1 capsule (18 mcg) into the lungs daily 90 capsule 3     VENTOLIN  (90 Base) MCG/ACT Inhaler INHALE 2 PUFFS BY MOUTH INTO THE LUNGS EVERY 4 HOURS AS NEEDED 54 g 9     budesonide (PULMICORT) 1 MG/2ML SUSP neb solution Take 2 mLs  (1 mg) by nebulization 2 times daily 60 ampule 11     Social History     Tobacco Use     Smoking status: Former Smoker     Packs/day: 1.00     Years: 30.00     Pack years: 30.00     Types: Cigarettes     Last attempt to quit: 10/21/2005     Years since quittin.3     Smokeless tobacco: Never Used   Substance Use Topics     Alcohol use: No     Alcohol/week: 0.0 oz     Comment: sober since      Family History   Problem Relation Age of Onset     Alcohol/Drug Mother      Neurologic Disorder Mother         dec 61 yo     C.A.D. Father 49         of MI     Respiratory Father         COPD     Obesity Daughter      Hypertension Daughter      Respiratory Sister         dec 42 yo - pulm dis     Alcohol/Drug Sister         dec 41 yo - anorexia     Cancer Brother         Eye Cancer     Thyroid Disease Maternal Grandmother         CA         ROS:    10 point ROS of systems including Constitutional, Eyes, Cardiovascular, Gastroenterology, Genitourinary, Integumentary,  Psychiatric were all negative except for pertinent positives noted in my HPI         OBJECTIVE:  BP (!) 149/95   Pulse 97   Temp 98.1  F (36.7  C) (Tympanic)   Resp 16   Wt 86.2 kg (190 lb)   LMP 2004   SpO2 98%   BMI 31.62 kg/m    GENERAL APPEARANCE: healthy, alert and no distress  EYES: EOMI,  PERRL, conjunctiva clear  HENT: ear canals and TM's normal.  Nose and mouth without ulcers, erythema or lesions  NECK: supple, nontender, no lymphadenopathy  RESP:shallow breath sounds - no rales, rhonchi or wheezes  CV: regular rates and rhythm, normal S1 S2, no murmur noted  ABDOMEN:  soft, nontender, no HSM or masses and bowel sounds normal  SKIN: no suspicious lesions or rashes  Physical Exam          Medical Decision Making:    Differential Diagnosis:  URI Adult/Peds:  Asthma exacerbation, Bronchospasm, Viral upper respiratory illness and emphysema   Copd ,  RUQ pain possibly from rib contusion, plueritis, pneumothorax, rib fracture , gall  stones       ASSESSMENT:  Mary Kay was seen today for urgent care and chest pain.    Diagnoses and all orders for this visit:    RUQ abdominal pain  -     US Abdomen Complete    Rib pain on right side  -     XR Ribs & Chest Rt 3vw    Chronic obstructive pulmonary disease, unspecified COPD type (H)      Chest xray showed emphysematous changes no other abnormality noted     PLAN:  For COPD Continue on oxygen  And also inhalors   For RUQ pain ultrasound ordered   Discussed if notices any worsening pain with nausea or throwing up should follow up in the ER   Advised to do tylenol for the pain       Tamy Bertrand MD     See orders in Epic

## 2019-02-19 ENCOUNTER — HOSPITAL ENCOUNTER (OUTPATIENT)
Dept: ULTRASOUND IMAGING | Facility: CLINIC | Age: 59
Discharge: HOME | End: 2019-02-19
Attending: FAMILY MEDICINE | Admitting: FAMILY MEDICINE
Payer: MEDICARE

## 2019-02-19 PROCEDURE — 76705 ECHO EXAM OF ABDOMEN: CPT

## 2019-02-25 ENCOUNTER — TELEPHONE (OUTPATIENT)
Dept: FAMILY MEDICINE | Facility: CLINIC | Age: 59
End: 2019-02-25

## 2019-02-25 NOTE — TELEPHONE ENCOUNTER
Reason for Call:  Request for results:    Name of test or procedure: US Abdomen    Date of test of procedure: 2-13    Location of the test or procedure: [facility]    OK to leave the result message on voice mail or with a family member? YES    Phone number Patient can be reached at:  Home number on file [phone] (home)    Additional comments:  Call with results     Call taken on 2/25/2019 at 9:24 AM by Jose Eduardo Trotter

## 2019-02-26 ENCOUNTER — DOCUMENTATION ONLY (OUTPATIENT)
Dept: CARE COORDINATION | Facility: CLINIC | Age: 59
End: 2019-02-26

## 2019-03-01 ENCOUNTER — OFFICE VISIT (OUTPATIENT)
Dept: FAMILY MEDICINE | Facility: CLINIC | Age: 59
End: 2019-03-01
Payer: MEDICARE

## 2019-03-01 ENCOUNTER — DOCUMENTATION ONLY (OUTPATIENT)
Dept: LAB | Facility: CLINIC | Age: 59
End: 2019-03-01
Payer: MEDICARE

## 2019-03-01 VITALS
BODY MASS INDEX: 30.82 KG/M2 | HEIGHT: 65 IN | DIASTOLIC BLOOD PRESSURE: 88 MMHG | WEIGHT: 185 LBS | TEMPERATURE: 97.2 F | OXYGEN SATURATION: 96 % | SYSTOLIC BLOOD PRESSURE: 138 MMHG | HEART RATE: 107 BPM

## 2019-03-01 DIAGNOSIS — Z23 NEED FOR 23-POLYVALENT PNEUMOCOCCAL POLYSACCHARIDE VACCINE: ICD-10-CM

## 2019-03-01 DIAGNOSIS — F10.21 ALCOHOL DEPENDENCE IN REMISSION (H): ICD-10-CM

## 2019-03-01 DIAGNOSIS — J44.9 CHRONIC OBSTRUCTIVE PULMONARY DISEASE, UNSPECIFIED COPD TYPE (H): ICD-10-CM

## 2019-03-01 DIAGNOSIS — F32.0 MILD MAJOR DEPRESSION (H): ICD-10-CM

## 2019-03-01 DIAGNOSIS — C34.10 MALIGNANT NEOPLASM OF UPPER LOBE OF LUNG, UNSPECIFIED LATERALITY (H): Primary | ICD-10-CM

## 2019-03-01 DIAGNOSIS — E78.5 HYPERLIPIDEMIA LDL GOAL <130: ICD-10-CM

## 2019-03-01 DIAGNOSIS — R35.0 URINARY FREQUENCY: Primary | ICD-10-CM

## 2019-03-01 DIAGNOSIS — F32.0 MAJOR DEPRESSIVE DISORDER, SINGLE EPISODE, MILD (H): ICD-10-CM

## 2019-03-01 LAB
ALBUMIN SERPL-MCNC: 3.7 G/DL (ref 3.4–5)
ALBUMIN UR-MCNC: NEGATIVE MG/DL
ALP SERPL-CCNC: 69 U/L (ref 40–150)
ALT SERPL W P-5'-P-CCNC: 26 U/L (ref 0–50)
ANION GAP SERPL CALCULATED.3IONS-SCNC: 5 MMOL/L (ref 3–14)
APPEARANCE UR: CLEAR
AST SERPL W P-5'-P-CCNC: 23 U/L (ref 0–45)
BILIRUB SERPL-MCNC: 0.3 MG/DL (ref 0.2–1.3)
BILIRUB UR QL STRIP: NEGATIVE
BUN SERPL-MCNC: 7 MG/DL (ref 7–30)
CALCIUM SERPL-MCNC: 8.8 MG/DL (ref 8.5–10.1)
CHLORIDE SERPL-SCNC: 102 MMOL/L (ref 94–109)
CHOLEST SERPL-MCNC: 190 MG/DL
CO2 SERPL-SCNC: 29 MMOL/L (ref 20–32)
COLOR UR AUTO: YELLOW
CREAT SERPL-MCNC: 0.47 MG/DL (ref 0.52–1.04)
ERYTHROCYTE [DISTWIDTH] IN BLOOD BY AUTOMATED COUNT: 13.5 % (ref 10–15)
GFR SERPL CREATININE-BSD FRML MDRD: >90 ML/MIN/{1.73_M2}
GLUCOSE SERPL-MCNC: 91 MG/DL (ref 70–99)
GLUCOSE UR STRIP-MCNC: NEGATIVE MG/DL
HCT VFR BLD AUTO: 39.1 % (ref 35–47)
HDLC SERPL-MCNC: 72 MG/DL
HGB BLD-MCNC: 11.9 G/DL (ref 11.7–15.7)
HGB UR QL STRIP: NEGATIVE
KETONES UR STRIP-MCNC: NEGATIVE MG/DL
LDLC SERPL CALC-MCNC: 104 MG/DL
LEUKOCYTE ESTERASE UR QL STRIP: NEGATIVE
MCH RBC QN AUTO: 27.7 PG (ref 26.5–33)
MCHC RBC AUTO-ENTMCNC: 30.4 G/DL (ref 31.5–36.5)
MCV RBC AUTO: 91 FL (ref 78–100)
NITRATE UR QL: NEGATIVE
NONHDLC SERPL-MCNC: 118 MG/DL
PH UR STRIP: 7 PH (ref 5–7)
PLATELET # BLD AUTO: 314 10E9/L (ref 150–450)
POTASSIUM SERPL-SCNC: 4.2 MMOL/L (ref 3.4–5.3)
PROT SERPL-MCNC: 7.4 G/DL (ref 6.8–8.8)
RBC # BLD AUTO: 4.3 10E12/L (ref 3.8–5.2)
SODIUM SERPL-SCNC: 136 MMOL/L (ref 133–144)
SOURCE: NORMAL
SP GR UR STRIP: 1.01 (ref 1–1.03)
T4 FREE SERPL-MCNC: 1.18 NG/DL (ref 0.76–1.46)
TRIGL SERPL-MCNC: 70 MG/DL
TSH SERPL DL<=0.005 MIU/L-ACNC: 5.11 MU/L (ref 0.4–4)
UROBILINOGEN UR STRIP-ACNC: 0.2 EU/DL (ref 0.2–1)
WBC # BLD AUTO: 6.8 10E9/L (ref 4–11)

## 2019-03-01 PROCEDURE — 90732 PPSV23 VACC 2 YRS+ SUBQ/IM: CPT | Performed by: INTERNAL MEDICINE

## 2019-03-01 PROCEDURE — G0009 ADMIN PNEUMOCOCCAL VACCINE: HCPCS | Performed by: INTERNAL MEDICINE

## 2019-03-01 PROCEDURE — 80053 COMPREHEN METABOLIC PANEL: CPT | Performed by: INTERNAL MEDICINE

## 2019-03-01 PROCEDURE — 85027 COMPLETE CBC AUTOMATED: CPT | Performed by: INTERNAL MEDICINE

## 2019-03-01 PROCEDURE — 84443 ASSAY THYROID STIM HORMONE: CPT | Performed by: INTERNAL MEDICINE

## 2019-03-01 PROCEDURE — 80061 LIPID PANEL: CPT | Performed by: INTERNAL MEDICINE

## 2019-03-01 PROCEDURE — 81003 URINALYSIS AUTO W/O SCOPE: CPT | Performed by: INTERNAL MEDICINE

## 2019-03-01 PROCEDURE — 99214 OFFICE O/P EST MOD 30 MIN: CPT | Mod: 25 | Performed by: INTERNAL MEDICINE

## 2019-03-01 PROCEDURE — 36415 COLL VENOUS BLD VENIPUNCTURE: CPT | Performed by: INTERNAL MEDICINE

## 2019-03-01 PROCEDURE — 84439 ASSAY OF FREE THYROXINE: CPT | Performed by: INTERNAL MEDICINE

## 2019-03-01 ASSESSMENT — MIFFLIN-ST. JEOR: SCORE: 1415.03

## 2019-03-01 ASSESSMENT — PATIENT HEALTH QUESTIONNAIRE - PHQ9: SUM OF ALL RESPONSES TO PHQ QUESTIONS 1-9: 12

## 2019-03-01 NOTE — PROGRESS NOTES
SUBJECTIVE:   Mary Kay Deal is a 58 year old female who presents to clinic today for the following health issues:    Chief Complaint   Patient presents with     Follow Up     on ultrasound      Health Maintenance     colonoscopy set at 3 yrs.  Patient called to schedule and was told not due yet.   Pap --  not done elsewhere / kd        [facility]  CLINIC PROGRESS NOTE    Subjective:  COPD   Mary Kay Deal has been using oxygen continuously and 4l.  She has had lower rib pain off and on for the past 3 weeks.  She did have X-ray that was stable and abdominal ultrasound that was also within normal limits but for non-alcoholic fatty liver disease.  She continues to have concerns of oscillating left and right sided lower rib pain.  She is able to take her normal breaths, but they are not deep breath.  She is relying on her accessory muscles routinely.  She does have an inkling that her pain may somehow be related to the food she is eating and she has tried adjusting her diet to minimize caffeine and chocolate.  She has not seen respiratory therapy or had pulmonary rehab for many years.  She does note that her upper back muscles are not as strong as they once were.  Alcohol dependence in remission (H)    She has been sober now for 33 years.      Past medical history, medications, allergies, social history, family history reviewed and updated in [facility] as of 3/1/2019 .    ROS  CONSTITUTIONAL: no fatigue, no unexpected change in weight  SKIN: dry skin   EYES: no acute vision problems or changes  ENT: no ear problems, no mouth problems, no throat problems  RESP: as above   CV: no chest pain, no palpitations, no new or worsening peripheral edema  GI: no nausea, no vomiting, no constipation, no diarrhea - chest pain may be related to food,  : no frequency, no dysuria, no hematuria  MS: no claudication, no myalgias, no joint aches  PSYCHIATRIC: no changes in mood or affect      Objective:  Vitals  /88 (BP  "Location: Left arm, Patient Position: Chair, Cuff Size: Adult Regular)   Pulse 107   Temp 97.2  F (36.2  C) (Oral)   Ht 1.651 m (5' 5\")   Wt 83.9 kg (185 lb)   LMP 09/08/2004   SpO2 96%   BMI 30.79 kg/m    GEN: Alert Oriented x3 NAD  HEENT: Atraumatic, normocephalic, neck supple, no thyromegaly, negative cervical adenopathy  TM: TM bilaterally pearly and grey with normal light reflex  CV: RRR no murmurs or rubs  PULM: CTA no wheezes or crackles  ABD: Soft, nontender nondistended, no hepatosplenomegally  SKIN: No visible skin lesion or ulcerations  EXT: 2+ dorsal pedis pulses, no edema bilateral lower extremities  NEURO: Gait and station deferred, No focal neurologic deficits  PSYCH: Mood good, affect mood congruent    No images are attached to the encounter.    No results found for this or any previous visit (from the past 24 hour(s)).    Assessment/Plan:  Patient Instructions   (C34.10) Malignant neoplasm of upper lobe of lung, unspecified laterality (H)  (primary encounter diagnosis)  Comment: Plan to follow up in oncology and last CT was in September  Plan: CBC with platelets, Comprehensive metabolic         panel, TSH with free T4 reflex            (F10.21) Alcohol dependence in remission (H)  Comment: Doing great - no issues  Plan: TSH with free T4 reflex            (J44.9) Chronic obstructive pulmonary disease, unspecified COPD type (H)  Comment: COPD is stable - I would recommend getting the theraband back to work on shoulder and upper back strength.  Also referral to pulmonary rehab -  respiratory therapy - [phone]  Plan: Comprehensive metabolic panel, TSH with free T4        reflex            (E78.5) Hyperlipidemia LDL goal <130  Comment: check fasting lipid panel today   Plan: Lipid panel reflex to direct LDL Fasting, TSH         with free T4 reflex            (F32.0) Mild major depression (H)  Comment: doing well - no change in sertraline  Plan: TSH with free T4 reflex               Follow " up in 6 months    Disclaimer: This note consists of symbols derived from keyboarding, dictation and/or voice recognition software. As a result, there may be errors in the script that have gone undetected. Please consider this when interpreting information found in this chart.    Doug Godoy MD  [phone]

## 2019-03-01 NOTE — PROGRESS NOTES
Patient left urine sample for testing.  If needed, please place order for lab.  Thank you lab,           ROS      Physical Exam

## 2019-03-01 NOTE — NURSING NOTE
Screening Questionnaire for Adult Immunization    Are you sick today?   No   Do you have allergies to medications, food, a vaccine component or latex?   No   Have you ever had a serious reaction after receiving a vaccination?   No   Do you have a long-term health problem with heart disease, lung disease, asthma, kidney disease, metabolic disease (e.g. diabetes), anemia, or other blood disorder?   No   Do you have cancer, leukemia, HIV/AIDS, or any other immune system problem?   No   In the past 3 months, have you taken medications that affect  your immune system, such as prednisone, other steroids, or anticancer drugs; drugs for the treatment of rheumatoid arthritis, Crohn s disease, or psoriasis; or have you had radiation treatments?   No   Have you had a seizure, or a brain or other nervous system problem?   No   During the past year, have you received a transfusion of blood or blood     products, or been given immune (gamma) globulin or antiviral drug?   No   For women: Are you pregnant or is there a chance you could become        pregnant during the next month?   No   Have you received any vaccinations in the past 4 weeks?   No     Immunization questionnaire answers were all negative.        Per orders of Dr. Godoy, injection of PCV 23 given by Eric Egan. Patient instructed to remain in clinic for 15 minutes afterwards, and to report any adverse reaction to me immediately.     Prior to injection, verified patient identity using patient's name and date of birth.  Due to injection administration, patient instructed to remain in clinic for 15 minutes  afterwards, and to report any adverse reaction to me immediately.    PCV 23    Drug Amount Wasted:  None.  Vial/Syringe: Single dose vial  Expiration Date:  04/16/2020  Eric Egan CMA on 3/1/2019 at 12:14 PM      Screening performed by Eric Egan on 3/1/2019 at 12:12 PM.

## 2019-03-04 ENCOUNTER — TELEPHONE (OUTPATIENT)
Dept: FAMILY MEDICINE | Facility: CLINIC | Age: 59
End: 2019-03-04

## 2019-03-04 DIAGNOSIS — E03.8 SUBCLINICAL HYPOTHYROIDISM: Primary | ICD-10-CM

## 2019-03-04 NOTE — RESULT ENCOUNTER NOTE
Nba Muñiz,    I had the opportunity to review your recent labs and a summary of your labs reads as follows:    Your complete blood counts show no sign of anemia, normal white blood cell count and platelets.  Your comprehensive metabolic panel showed normal renal function, normal liver function, and normal fasting blood glucose indicating no evidence of diabetes mellitus.  Your fasting lipid panel show  - normal HDL (good) cholesterol -as your goal is greater than 40  - stable LDL (bad) cholesterol as your goal is less than 100  - normal triglyceride levels    Your TSH is a bit elevated with a normal free T4. I would recommend we recheck this again in 6 weeks    Sincerely,  Doug Godoy MD

## 2019-03-05 DIAGNOSIS — E78.5 HYPERLIPIDEMIA LDL GOAL <130: ICD-10-CM

## 2019-03-05 NOTE — TELEPHONE ENCOUNTER
"simvastatin (ZOCOR) 20 MG tablet    Last Written Prescription Date:  01/29/2018  Last Fill Quantity: 90,  # refills: 3   Last office visit: 3/1/2019 with prescribing provider:  Jayne    Future Office Visit:  09/01/2019    Requested Prescriptions   Pending Prescriptions Disp Refills     simvastatin (ZOCOR) 20 MG tablet [Pharmacy Med Name: SIMVASTATIN 20MG TABS] 90 tablet 3     Sig: TAKE ONE TABLET BY MOUTH EVERY NIGHT AT BEDTIME    Statins Protocol Passed - 3/5/2019 12:11 PM       Passed - LDL on file in past 12 months    Recent Labs   Lab Test 03/01/19  1211   *            Passed - No abnormal creatine kinase in past 12 months    No lab results found.            Passed - Recent (12 mo) or future (30 days) visit within the authorizing provider's specialty    Patient had office visit in the last 12 months or has a visit in the next 30 days with authorizing provider or within the authorizing provider's specialty.  See \"Patient Info\" tab in inbasket, or \"Choose Columns\" in Meds & Orders section of the refill encounter.             Passed - Medication is active on med list       Passed - Patient is age 18 or older       Passed - No active pregnancy on record       Passed - No positive pregnancy test in past 12 months          "

## 2019-03-06 RX ORDER — SIMVASTATIN 20 MG
TABLET ORAL
Qty: 90 TABLET | Refills: 3 | Status: SHIPPED | OUTPATIENT
Start: 2019-03-06 | End: 2020-03-19

## 2019-03-06 NOTE — TELEPHONE ENCOUNTER
Prescription approved per [facility] Refill Protocol.  Jennifer Paz RN- Triage [employer]

## 2019-04-01 ENCOUNTER — OFFICE VISIT (OUTPATIENT)
Dept: PULMONOLOGY | Facility: CLINIC | Age: 59
End: 2019-04-01
Attending: INTERNAL MEDICINE
Payer: MEDICARE

## 2019-04-01 VITALS
BODY MASS INDEX: 30.82 KG/M2 | RESPIRATION RATE: 16 BRPM | HEIGHT: 65 IN | HEART RATE: 90 BPM | WEIGHT: 184.97 LBS | OXYGEN SATURATION: 96 % | DIASTOLIC BLOOD PRESSURE: 90 MMHG | SYSTOLIC BLOOD PRESSURE: 153 MMHG

## 2019-04-01 DIAGNOSIS — J44.9 CHRONIC AIRWAY OBSTRUCTION (H): Primary | ICD-10-CM

## 2019-04-01 DIAGNOSIS — J43.2 CENTRILOBULAR EMPHYSEMA (H): Primary | ICD-10-CM

## 2019-04-01 LAB
EXPTIME-PRE: 7.75 SEC
FEF2575-%PRED-PRE: 7 %
FEF2575-PRE: 0.17 L/SEC
FEF2575-PRED: 2.37 L/SEC
FEFMAX-%PRED-PRE: 20 %
FEFMAX-PRE: 1.32 L/SEC
FEFMAX-PRED: 6.5 L/SEC
FEV1-%PRED-PRE: 13 %
FEV1-PRE: 0.35 L
FEV1FEV6-PRE: 31 %
FEV1FEV6-PRED: 81 %
FEV1FVC-PRE: 27 %
FEV1FVC-PRED: 79 %
FIFMAX-PRE: 1.98 L/SEC
FVC-%PRED-PRE: 39 %
FVC-PRE: 1.3 L
FVC-PRED: 3.31 L

## 2019-04-01 PROCEDURE — G0463 HOSPITAL OUTPT CLINIC VISIT: HCPCS | Mod: ZF

## 2019-04-01 ASSESSMENT — MIFFLIN-ST. JEOR: SCORE: 1414.88

## 2019-04-01 ASSESSMENT — PAIN SCALES - GENERAL: PAINLEVEL: NO PAIN (0)

## 2019-04-01 NOTE — NURSING NOTE
Chief Complaint   Patient presents with     RECHECK     COPD follow up      Shabnam Baker CMA   pt did not have colonoscopy done yet, contact information has been added to AVS.

## 2019-04-01 NOTE — PATIENT INSTRUCTIONS
Preventive Care:     Colorectal Cancer Screening: During our visit today, we discussed that it is recommended you receive colorectal cancer screening. Please call or make an appointment with your primary care provider to discuss this. You may also call the [facility] scheduling line ([phone]) to set up a colonoscopy appointment.

## 2019-04-01 NOTE — PROGRESS NOTES
Reason for Visit  Mary Kay Deal is a 58 year old female who is being seen for RECHECK (COPD follow up )      Pulmonary HPI  The patient was seen and examined by Keith Mason MD     Mary Kay Deal is a 58 year old female with severe COPD on home O2. 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  She was empirically treated with SBRT for possible lung cancer (non biopsy proven) in the left UL in 8/2013. She has multiple pulmonary nodules.      Interval History   Overall since her last clinic visit she has been reasonably stable with regards to her severe pulmonary symptoms.  The last week and she had some increased wheezing and took some extra prednisone for 2 days.  She continues to do nebs twice a day and Spiriva consistently.  She finally coughed up some phlegm today.  But otherwise she does not cough up on a regular basis.  Denies having any chest pains.    She is sleeping okay.  Denies any significant leg swelling.    O2 Requirements/Use:   She uses continuous flow when she is at rest at home and demand flow when she is out of the house. She leaves it at 4 LPM at rest. While she is walking she requires 4 LPM. When she is sleeping she uses 4 LPM supplemental O2.      Current Outpatient Medications   Medication     arformoterol (BROVANA) 15 MCG/2ML NEBU neb solution     azithromycin (ZITHROMAX) 250 MG tablet     Calcium (CALCIUM 600) 600 MG tablet     Chlorpheniramine-DM 4-20 MG TABS     Cholecalciferol (VITAMIN D) 1000 UNIT capsule     fluticasone (FLONASE) 50 MCG/ACT spray     LORazepam (ATIVAN) 0.5 MG tablet     montelukast (SINGULAIR) 10 MG tablet     MULTIVITAMIN TABS   OR     omeprazole (PRILOSEC) 20 MG DR capsule     order for DME     ORDER FOR DME     predniSONE (DELTASONE) 10 MG tablet     Respiratory Therapy Supplies (NEBULIZER COMPRESSOR) KIT     SENNA LAXATIVE OR     sertraline (ZOLOFT) 50 MG tablet     simvastatin (ZOCOR) 20 MG tablet     tiotropium (SPIRIVA  HANDIHALER) 18 MCG capsule     VENTOLIN  (90 Base) MCG/ACT Inhaler     acetaminophen-codeine (TYLENOL #3) 300-30 MG per tablet     budesonide (PULMICORT) 1 MG/2ML SUSP neb solution     Misc. Devices (FINGERTIP PULSE OXIMETER) MISC     No current facility-administered medications for this visit.        Allergies   Allergen Reactions     Penicillins Hives       Past Medical History:   Diagnosis Date     Alcohol dependence (H)     completed treatment in      Aspergillosis, with pneumonia (H)      Cancer (H)     questionable lung CA     Chronic infection     Known fungal lung condition     Constipation      Disorder of bone and cartilage, unspecified      Emphysema      GERD      Hx of radiation therapy     For lung mass     Mild intermittent asthma      Other dyspnea and respiratory abnormality      Other malaise and fatigue      SMOKER        Past Surgical History:   Procedure Laterality Date     BREAST SURGERY      Tumor removal at the age of 15     COLONOSCOPY N/A 2014    Procedure: COLONOSCOPY;  Surgeon: Patricia Perry MD;  Location:  OR     COLONOSCOPY N/A 2015    Procedure: COMBINED COLONOSCOPY, SINGLE OR MULTIPLE BIOPSY/POLYPECTOMY BY BIOPSY;  Surgeon: Patricia Perry MD;  Location:  GI     ENT SURGERY      Tonsillectomy     GYN SURGERY       x 1     SURGICAL HISTORY OF -       s/p Breast tumor @ age 15 - benign     SURGICAL HISTORY OF -       c section     SURGICAL HISTORY OF -       tonsillectomy       Social History     Socioeconomic History     Marital status:      Spouse name: Not on file     Number of children: Not on file     Years of education: Not on file     Highest education level: Not on file   Occupational History     Not on file   Social Needs     Financial resource strain: Not on file     Food insecurity:     Worry: Not on file     Inability: Not on file     Transportation needs:     Medical: Not on file     Non-medical: Not on file   Tobacco  Use     Smoking status: Former Smoker     Packs/day: 1.00     Years: 30.00     Pack years: 30.00     Types: Cigarettes     Last attempt to quit: 10/21/2005     Years since quittin.4     Smokeless tobacco: Never Used   Substance and Sexual Activity     Alcohol use: No     Alcohol/week: 0.0 oz     Comment: sober since      Drug use: No     Sexual activity: Yes     Partners: Male     Comment: vas   Lifestyle     Physical activity:     Days per week: Not on file     Minutes per session: Not on file     Stress: Not on file   Relationships     Social connections:     Talks on phone: Not on file     Gets together: Not on file     Attends Hoahaoism service: Not on file     Active member of club or organization: Not on file     Attends meetings of clubs or organizations: Not on file     Relationship status: Not on file     Intimate partner violence:     Fear of current or ex partner: Not on file     Emotionally abused: Not on file     Physically abused: Not on file     Forced sexual activity: Not on file   Other Topics Concern     Parent/sibling w/ CABG, MI or angioplasty before 65F 55M? Yes   Social History Narrative    , 3 children    Retired  at [employer]          The patient is currently on disability due to COPD.  She rides a scooter due to her lungs.  She used to work as a .  She and her  are living with their daughter in [place].  They have a [place]orgi mix.  The patient stopped smoking 9 years ago.  She smoked 1-2 packs of cigarettes per day for 30 years, consistent with 45-60 pack year history.  She denies cigars, pipes or chewing tobacco.  She smoked marijuana in her teens.  The patient is an alcoholic and has been sober for the past 27 years.  Caffeine intake includes 4-5 cups of caffeinated coffee every morning.  She does not drink tea.  She consumes 1 can of Diet Pepsi as late as 7:00 p.m.  She eats chocolate candy bars as late as 7:00 p.m.  She does not  "exercise.  She has a treadmill, so she could; however, as of this time she is not exercising.                ROS Pulmonary  Constitutional- Negative. She has been trying to watch her diet and eat healthier.  Eyes- Negative.  Ear, nose and throat- Negative.  Cardiac- Negative.  Pulm- See HPI  GI- Positive. Heartburn has improved; continues to sleep with HOB elevation.   Genitourinary- Negative.  Musculoskeletal- Negative.   Neurology- Negative.  Dermatology- Negative.  Endocrine- Negative.  Lymphatic- Negative.  Psychiatry- Negative.  A complete ROS was otherwise negative except as noted in the HPI.      /90 (BP Location: Right arm, Patient Position: Chair, Cuff Size: Adult Regular)   Pulse 90   Resp 16   Ht 1.651 m (5' 5\")   Wt 83.9 kg (184 lb 15.5 oz)   LMP 09/08/2004   SpO2 96%   BMI 30.78 kg/m   on O2 NC.  Body mass index is 30.78 kg/m .   Physical Exam  GENERAL APPEARANCE: Well developed, well nourished, alert, and in no apparent distress.  EYES: PERRL, EOMI  HENT: Nasal mucosa with no edema and no hyperemia. No nasal polyps.  EARS: Canals clear, TMs normal.  MOUTH: Oral mucosa is moist, without any lesions, no tonsillar enlargement, no oropharyngeal exudate.  NECK: Supple, no masses, no thyromegaly.  LYMPHATICS: No significant axillary, cervical, or supraclavicular nodes.  RESP: Normal percussion, very diminished air flow throughout. No crackles. No rhonchi. No wheezes.  CV: Normal S1, S2, regular rhythm, normal rate. No murmur. No rub. No gallop. No LE edema.      Results  Recent Results (from the past 168 hour(s))   General PFT Lab (Please always keep checked)    Collection Time: 04/01/19 11:22 AM   Result Value Ref Range    FVC-Pred 3.31 L    FVC-Pre 1.30 L    FVC-%Pred-Pre 39 %    FEV1-Pre 0.35 L    FEV1-%Pred-Pre 13 %    FEV1FVC-Pred 79 %    FEV1FVC-Pre 27 %    FEFMax-Pred 6.50 L/sec    FEFMax-Pre 1.32 L/sec    FEFMax-%Pred-Pre 20 %    FEF2575-Pred 2.37 L/sec    FEF2575-Pre 0.17 L/sec    " KOU0807-%Pred-Pre 7 %    ExpTime-Pre 7.75 sec    FIFMax-Pre 1.98 L/sec    FEV1FEV6-Pred 81 %    FEV1FEV6-Pre 31 %                 Results as noted above.    PFT Interpretation:  Very severe obstructive ventilatory defect.  Unchanged from previous.   Similar to recent best.  Valid Maneuver    Chest CT (9/17/18): no evidence of malignancy.      Assessment and Plan: Mary Kay Deal is a 58-year-old female has a longstanding history of severe obstructive lung disease who comes today to the clinic for follow up.    1. RUL Mass /Aspergillus:  This lesion was biopsied by IR and it showed aspergillus and was treated with one month course of voriconazole. Due to lack of improvement it was stopped as per patient. This has remained stable. This was followed/managed by Dr. Pierre.    2. Emphysema: Very severe lung disease.  - Will cont O2 at night and with activity.  6MWT (1/22/2016): Walked 460 ft on 4 lpm NC. Hence recommended to use this during the day with exertion and while sleeping. Can be on RA at rest.   - Will cont flu vaccine every year and has received pneumonia vaccine 5 years apart x2 doses. Received PCV 13 12/5/2014.  - Cont spiriva and prn nebs/inhalers. Cont Budesonide/Brovana nebs.  - Cont azithromycin daily. Will follow Qtc prn --457msec on 6/13/2015  - She would benefit from doing outpt pulm rehab but she is unable to do it due to lack of transportation. She has a treadmill at home and is currently using it to exercise.  - Cont prn lorazepam.  - Cont singular daily.   3/26/2018: FEV1 relatively stable. Symptoms are stable. She has not had any progression of pulmonary nodules since 5 years ago. She was encouraged to lose weight.  4/1/2019: FEV1 today (0.38) relatively stable from previous evaluation (0.41). Her pulmonary symptoms are stable. She leaves her oxygen at 4 LPM. Encouraged her to be more active to help with pulmonary function.  Consideration for Lung Transplant: Given her lung function, she may be  candidate for a lung transplant. We discussed her case at conference. Given her phone numbers to call lung tx referral line. She is interested in participating in lung tx support group to help her make the decision. I will call lung tx  to see if this can be arranged.    Insomnia: Sleep study - no MADELIN noted (5/2014).    3. Pulmonary Nodules:    - Chest CT done on 08/3/12 showed a new left UL nodule that increased in size when followed. This was reviewed at Pulmonary nodule conference and given the risk of doing biopsy and after seeing Dr. Lorenzo she underwent SBRT. SBRT 5000cGy in 5 fractions over 3 months - last dose on 08/29/13.  Last Chest CT was in 9/2018 - no evidence of malignancy.  --  She is followed by Radiation oncology and they will continue to determine appropriate imaging follow up. She will get a Chest CT in one month. We will at the minimum need yearly Chest CT scans.  10/1/2018: CT of chest on 9/17/2018 was stable and revealed no evidence of progressive or recurrent malignancy. As per pt Rad oncology would only do Chest CT annually.  4/1/2019: Chest CT due on 9/2019.    4. Allergies:   - Cont flonase 1 spray each nostril BID.   - To do NetiPot (nasal rinses) PRN. Patient has not started nasal rinses.   - Cont Singulair.  4/1/2019: No longer taking Flonase. She is may be taking the generic version of Singulair, but is unsure. She does note sinus congestion as well as dry eyes in the morning.    5. Obesity:   -  Strongly advised to loose weight.   - Encouraged patient to eat healthier and gradually increase her exercise for weight loss.  4/1/2019: Encouraged the patient to increase her exercise and monitor her diet for weight loss.    6. GERD:   - Controlled on PPI at 20mg daily.   - Seen by GI for this and constipation. Recommended FODMAP diet trial, which pt has not instituted yet.   - She had some dysphagia (pills getting stuck) hence had esophagagram on 1/2018 which showed mild  dysmotility. Recommended by Dr. Godoy to follow up with GI.  3/26/2018: She was instructed to take Prilosec 20 mg BID to better control her GERD.  4/1/2019: Heart burn well controlled.    7. H/O Presumed Lung CA ([id number]): Continue follow up with Radiation oncology, they will decide about treatment regimen and CT's. This was Left UL lesion.  - TYPE OF RADIOTHERAPY GIVEN: SBRT 5000cGy in 5 fractions, 6MV, 80%IDL  - Currently on annual chest CT regimen.    8. Health Care Maintenance:  4/1/2019:  Consider Shingrix and TDaP vaccinations in the future.      F/U in 6 months with spirometry and chest CT. Will try to coordinate appointment with oncology (Tuesdays).

## 2019-04-03 DIAGNOSIS — J43.2 CENTRILOBULAR EMPHYSEMA (H): ICD-10-CM

## 2019-04-03 RX ORDER — MONTELUKAST SODIUM 10 MG/1
TABLET ORAL
Qty: 30 TABLET | Refills: 11 | Status: SHIPPED | OUTPATIENT
Start: 2019-04-03 | End: 2020-03-12

## 2019-04-03 RX ORDER — TIOTROPIUM BROMIDE 18 UG/1
CAPSULE ORAL; RESPIRATORY (INHALATION)
Qty: 90 CAPSULE | Refills: 3 | Status: SHIPPED | OUTPATIENT
Start: 2019-04-03 | End: 2020-03-12

## 2019-04-03 RX ORDER — AZITHROMYCIN 250 MG/1
TABLET, FILM COATED ORAL
Qty: 30 TABLET | Refills: 11 | Status: SHIPPED | OUTPATIENT
Start: 2019-04-03 | End: 2020-03-12

## 2019-04-03 RX ORDER — FLUTICASONE PROPIONATE 50 MCG
SPRAY, SUSPENSION (ML) NASAL
Qty: 16 G | Refills: 11 | Status: SHIPPED | OUTPATIENT
Start: 2019-04-03 | End: 2020-03-19

## 2019-06-07 DIAGNOSIS — F41.9 ANXIETY: ICD-10-CM

## 2019-06-07 DIAGNOSIS — J44.9 CHRONIC OBSTRUCTIVE PULMONARY DISEASE, UNSPECIFIED COPD TYPE (H): ICD-10-CM

## 2019-06-07 RX ORDER — PREDNISONE 10 MG/1
TABLET ORAL
Qty: 23 TABLET | Refills: 1 | Status: SHIPPED | OUTPATIENT
Start: 2019-06-07 | End: 2020-07-29

## 2019-06-07 NOTE — TELEPHONE ENCOUNTER
Requested Prescriptions   Pending Prescriptions Disp Refills     LORazepam (ATIVAN) 0.5 MG tablet 90 tablet 3     Sig: Take 1 tablet (0.5 mg) by mouth every 8 hours as needed for anxiety       There is no refill protocol information for this order            Last Written Prescription Date:  10/01/18  Last Fill Quantity: 90 tablet,   # refills: 3  Last Office Visit: 3/1/2019 (Jayne)  Future Office visit:    Next 5 appointments (look out 90 days)    Sep 04, 2019 11:00 AM CDT  Office Visit with Doug Godoy MD  [facility] ([facility] [address] [id number]  [phone]           Routing refill request to provider for review/approval because:  Drug not on the [facility], [facility] or [facility] refill protocol or controlled substance

## 2019-06-10 RX ORDER — LORAZEPAM 0.5 MG/1
0.5 TABLET ORAL EVERY 8 HOURS PRN
Qty: 90 TABLET | Refills: 3 | Status: SHIPPED | OUTPATIENT
Start: 2019-06-10 | End: 2019-12-24

## 2019-06-10 NOTE — TELEPHONE ENCOUNTER
Last [facility] website verification:  done on 06/10/2019 [place]   [email or web address]    Routing refill request to provider for review/approval because:  Drug not on the [facility] refill protocol     Xin CHAPA RN

## 2019-06-11 NOTE — TELEPHONE ENCOUNTER
RX Faxed to      [facility], [place] - [address] [facility]

## 2019-06-21 ENCOUNTER — TELEPHONE (OUTPATIENT)
Dept: FAMILY MEDICINE | Facility: CLINIC | Age: 59
End: 2019-06-21

## 2019-06-21 DIAGNOSIS — F41.9 ANXIETY: ICD-10-CM

## 2019-06-21 RX ORDER — LORAZEPAM 0.5 MG/1
0.5 TABLET ORAL EVERY 8 HOURS PRN
Qty: 90 TABLET | Refills: 3 | Status: CANCELLED | OUTPATIENT
Start: 2019-06-21

## 2019-06-21 NOTE — TELEPHONE ENCOUNTER
TCs - per below pharmacy never received Lorazepam fax, can you refax script? ( Pharmacy)     Writer is upstairs    Thank you    Xin CHAPA RN

## 2019-08-22 ENCOUNTER — TELEPHONE (OUTPATIENT)
Dept: PULMONOLOGY | Facility: CLINIC | Age: 59
End: 2019-08-22

## 2019-08-22 NOTE — TELEPHONE ENCOUNTER
[facility] Call Center    Phone Message    May a detailed message be left on voicemail: yes    Reason for Call: Other: Pt wanted to confirm with the clinic to see if the appt on 10/7/2019 needs to be rescheduled or not. Pt called the scheudling line for the clinic to check on other appts and it was noted to the Pt that there was a message to reschedule 10/7/2019 appt. Please follow up with Pt.      Action Taken: Message routed to:  [facility] ([facility]): Pulm

## 2019-09-03 NOTE — PROGRESS NOTES
Subjective     Mary Kay Deal is a 59 year old female who presents to clinic today for the following health issues:    HPI   COPD Follow-Up    Overall, how are your COPD symptoms since your last clinic visit?  No change    How much fatigue or shortness of breath do you have when you are walking?  Same as usual    How much shortness of breath do you have when you are resting?  More than usual    How often do you cough? Sometimes    Have you noticed any change in your sputum/phlegm?  Yes- sometimes     Have you experienced a recent fever? No    Please describe how far you can walk without stopping to rest:  Less than 10 feet    How many flights of stairs are you able to walk up without stopping?  None    Have you had any Emergency Room Visits, Urgent Care Visits, or Hospital Admissions because of your COPD since your last office visit?  No    History   Smoking Status     Former Smoker     Packs/day: 1.00     Years: 30.00     Types: Cigarettes     Quit date: 10/21/2005   Smokeless Tobacco     Never Used     No results found for: FEV1, FRC6ETC      How many servings of fruits and vegetables do you eat daily?  0-1    On average, how many sweetened beverages do you drink each day (soda, juice, sweet tea, etc)?   0    How many days per week do you miss taking your medication? 0    [facility]  CLINIC PROGRESS NOTE    Subjective:   Centrilobular emphysema (H)   Mary Kay Deal has been having difficulty with shortness of breath and using oxygen 2-3 liters by nasal canula.  She has had follow up at [facility] as well.  She has not had any recent exacerbations, but has been under more stress as she is in the process of moving which has created some issues for her and strain on her current health.  Gastroesophageal reflux disease with esophagitis   Continued gastroesophageal reflux now for over 10 years and has not had an upper endoscopy in recent years.  Essential hypertension, benign   Blood pressure is notably  "elevated today.  She feels well.  No noticeable swelling in her ankles.  She is not taking any blood pressure medications.      Past medical history, medications, allergies, social history, family history reviewed and [facility] in [facility] as of 9/4/2019 .    ROS  CONSTITUTIONAL: no fatigue, no unexpected change in weight  SKIN: no worrisome rashes, no worrisome moles, no worrisome lesions  EYES: no acute vision problems or changes  ENT: no ear problems, no mouth problems, no throat problems  RESP: as above   CV: no chest pain, no palpitations, no new or worsening peripheral edema  GI: as above   : no frequency, no dysuria, no hematuria  MS: no claudication, no myalgias, no joint aches  PSYCHIATRIC: no changes in mood or affect      Objective:  Vitals  BP (!) 153/93 (BP Location: Right arm, Patient Position: Sitting, Cuff Size: Adult Regular)   Pulse 95   Temp 97.3  F (36.3  C) (Oral)   Resp 20   Ht 1.651 m (5' 5\")   Wt 83 kg (183 lb)   LMP 09/08/2004   SpO2 98%   BMI 30.45 kg/m    GEN: Alert Oriented x3 NAD  HEENT: Atraumatic, normocephalic, neck supple,  CV: RRR no murmurs or rubs  PULM: Diminished breath sounds - using oxygen 2L  ABD: Soft, nontender nondistended, no hepatosplenomegally  SKIN: No visible skin lesion or ulcerations  EXT:   no edema bilateral lower extremities  NEURO: Gait and station stable, No focal neurologic deficits  PSYCH: Mood good, affect mood congruent    No images are attached to the encounter.    No results found for this or any previous visit (from the past 24 hour(s)).    Assessment/Plan:  Patient Instructions   (J43.2) Centrilobular emphysema (H)  (primary encounter diagnosis)  Comment: We will give TDAP today and continue oxygen 2-3 liters by nasal canula   Plan: TDAP, IM (10 - 64 YRS) - Adacel, INJECTION         INTRAMUSCULAR OR SUB-Q            (K21.0) Gastroesophageal reflux disease with esophagitis  Comment: Recommend upper endoscopy - Will try to coordinate this through " [facility].  Try to reduce dose of omeprazole from 20 mg twice per day down to 20 mg daily  Plan: TDAP, IM (10 - 64 YRS) - Adacel, INJECTION         INTRAMUSCULAR OR SUB-Q, GASTROENTEROLOGY ADULT         REF PROCEDURE ONLY Other; ([place])            (I10) Essential hypertension, benign  Comment: Blood pressure is elevated today and we will start a new medication of amlodipine  Plan: amLODIPine (NORVASC) 5 MG tablet               Follow up in 2 weeks     Disclaimer: This note consists of symbols derived from keyboarding, dictation and/or voice recognition software. As a result, there may be errors in the script that have gone undetected. Please consider this when interpreting information found in this chart.    Doug Godoy MD  [phone]

## 2019-09-04 ENCOUNTER — OFFICE VISIT (OUTPATIENT)
Dept: FAMILY MEDICINE | Facility: CLINIC | Age: 59
End: 2019-09-04
Payer: MEDICARE

## 2019-09-04 VITALS
HEIGHT: 65 IN | TEMPERATURE: 97.3 F | SYSTOLIC BLOOD PRESSURE: 153 MMHG | BODY MASS INDEX: 30.49 KG/M2 | RESPIRATION RATE: 20 BRPM | WEIGHT: 183 LBS | HEART RATE: 95 BPM | OXYGEN SATURATION: 98 % | DIASTOLIC BLOOD PRESSURE: 93 MMHG

## 2019-09-04 DIAGNOSIS — J43.2 CENTRILOBULAR EMPHYSEMA (H): Primary | ICD-10-CM

## 2019-09-04 DIAGNOSIS — K21.00 GASTROESOPHAGEAL REFLUX DISEASE WITH ESOPHAGITIS: ICD-10-CM

## 2019-09-04 DIAGNOSIS — I10 ESSENTIAL HYPERTENSION, BENIGN: ICD-10-CM

## 2019-09-04 PROCEDURE — 99214 OFFICE O/P EST MOD 30 MIN: CPT | Mod: 25 | Performed by: INTERNAL MEDICINE

## 2019-09-04 PROCEDURE — 90715 TDAP VACCINE 7 YRS/> IM: CPT | Performed by: INTERNAL MEDICINE

## 2019-09-04 PROCEDURE — 90471 IMMUNIZATION ADMIN: CPT | Performed by: INTERNAL MEDICINE

## 2019-09-04 RX ORDER — AMLODIPINE BESYLATE 5 MG/1
5 TABLET ORAL DAILY
Qty: 90 TABLET | Refills: 3 | Status: SHIPPED | OUTPATIENT
Start: 2019-09-04 | End: 2020-07-31

## 2019-09-04 ASSESSMENT — ANXIETY QUESTIONNAIRES
7. FEELING AFRAID AS IF SOMETHING AWFUL MIGHT HAPPEN: SEVERAL DAYS
GAD7 TOTAL SCORE: 9
1. FEELING NERVOUS, ANXIOUS, OR ON EDGE: SEVERAL DAYS
5. BEING SO RESTLESS THAT IT IS HARD TO SIT STILL: SEVERAL DAYS
3. WORRYING TOO MUCH ABOUT DIFFERENT THINGS: MORE THAN HALF THE DAYS
6. BECOMING EASILY ANNOYED OR IRRITABLE: SEVERAL DAYS
2. NOT BEING ABLE TO STOP OR CONTROL WORRYING: MORE THAN HALF THE DAYS
IF YOU CHECKED OFF ANY PROBLEMS ON THIS QUESTIONNAIRE, HOW DIFFICULT HAVE THESE PROBLEMS MADE IT FOR YOU TO DO YOUR WORK, TAKE CARE OF THINGS AT HOME, OR GET ALONG WITH OTHER PEOPLE: SOMEWHAT DIFFICULT

## 2019-09-04 ASSESSMENT — MIFFLIN-ST. JEOR: SCORE: 1400.96

## 2019-09-04 ASSESSMENT — PATIENT HEALTH QUESTIONNAIRE - PHQ9
SUM OF ALL RESPONSES TO PHQ QUESTIONS 1-9: 9
5. POOR APPETITE OR OVEREATING: SEVERAL DAYS

## 2019-09-04 NOTE — NURSING NOTE
"Chief Complaint   Patient presents with     COPD     BP (!) 162/96 (BP Location: Right arm, Patient Position: Sitting, Cuff Size: Adult Regular)   Pulse 95   Temp 97.3  F (36.3  C) (Oral)   Resp 20   Ht 1.651 m (5' 5\")   Wt 83 kg (183 lb)   LMP 09/08/2004   SpO2 98%   BMI 30.45 kg/m   Estimated body mass index is 30.45 kg/m  as calculated from the following:    Height as of this encounter: 1.651 m (5' 5\").    Weight as of this encounter: 83 kg (183 lb).  bp completed using cuff size: regular       Health Maintenance addressed:  Pap Smear (will schedule PX), Colonoscopy/FIT (referral given), PHQ9 (today), JERILYN Assessment (today), TDAP (today), Shingles (will call ins) and adv care (patient declines)    Martín Cotton CMA, MA     "

## 2019-09-04 NOTE — PATIENT INSTRUCTIONS
(J43.2) Centrilobular emphysema (H)  (primary encounter diagnosis)  Comment: We will give TDAP today and continue oxygen 2-3 liters by nasal canula   Plan: TDAP, IM (10 - 64 YRS) - Adacel, INJECTION         INTRAMUSCULAR OR SUB-Q            (K21.0) Gastroesophageal reflux disease with esophagitis  Comment: Recommend upper endoscopy - Will try to coordinate this through [facility].  Try to reduce dose of omeprazole from 20 mg twice per day down to 20 mg daily  Plan: TDAP, IM (10 - 64 YRS) - Adacel, INJECTION         INTRAMUSCULAR OR SUB-Q, GASTROENTEROLOGY ADULT         REF PROCEDURE ONLY Other; ([place])            (I10) Essential hypertension, benign  Comment: Blood pressure is elevated today and we will start a new medication of amlodipine  Plan: amLODIPine (NORVASC) 5 MG tablet

## 2019-09-05 ASSESSMENT — ANXIETY QUESTIONNAIRES: GAD7 TOTAL SCORE: 9

## 2019-09-16 ENCOUNTER — ANCILLARY PROCEDURE (OUTPATIENT)
Dept: CT IMAGING | Facility: CLINIC | Age: 59
End: 2019-09-16
Attending: RADIOLOGY
Payer: MEDICARE

## 2019-09-16 ENCOUNTER — ALLIED HEALTH/NURSE VISIT (OUTPATIENT)
Dept: RADIATION ONCOLOGY | Facility: CLINIC | Age: 59
End: 2019-09-16
Attending: RADIOLOGY
Payer: MEDICARE

## 2019-09-16 VITALS — HEART RATE: 91 BPM | OXYGEN SATURATION: 98 % | DIASTOLIC BLOOD PRESSURE: 88 MMHG | SYSTOLIC BLOOD PRESSURE: 136 MMHG

## 2019-09-16 DIAGNOSIS — C34.12 MALIGNANT NEOPLASM OF UPPER LOBE OF LEFT LUNG (H): Primary | ICD-10-CM

## 2019-09-16 DIAGNOSIS — C34.12 MALIGNANT NEOPLASM OF UPPER LOBE OF LEFT LUNG (H): ICD-10-CM

## 2019-09-16 NOTE — PROGRESS NOTES
RADIATION ONCOLOGY FOLLOW-UP  19  NAME: Mary Kay Deal  MRN: [id number]  : 1960    DISEASE TREATED: Presumed NSCLC of the left upper lobe, [id number]    INTERVAL SINCE COMPLETION OF RADIOTHERAPY: ~6 years (Completed 2013)    TYPE OF RADIOTHERAPY GIVEN: SBRT 5000cGy in 5 fractions, 6MV, 80%IDL    SUBJECTIVE:   Mrs. Deal is a 57 year old woman with history of recurrent fungal infection and non-biopsy proven stage IA lung cancer of the RUBEN, s/p SBRT. She returns for routine follow-up.    On exam today, Ms. Deal is overall doing well.  She continues on oxygen between 2 -4L.  She occasionally has to take prednisone for shortness of breath, but estimates it is only every couple of months.  She only required antibiotics once this year for her lungs.  She is in a scooter. She otherwise denies fevers, chills, nausea, vomiting, diarrhea. A complete review of systems was otherwise unremarkable.  She does not feel as though the radiation has affected her lung function and has no side effects from the radiation.  She quit smoking with her lung cancer diagnosis.  OBJECTIVE:  /88   Pulse 91   LMP 2004   SpO2 98%   Gen: No acute distress. Alert, oriented.   Neck: No palpable LNs in the neck or supraclavicular areas.   CV: Regular rate and rhythm. No murmur.   Lungs: No wheezes, but decreased breath sounds throughout.    IMAGING:   CT scan 3/19/18  FINDINGS:  Lungs are severely emphysematous. Biapical fibrotic changes  again noted, stable. Scattered tiny nodules present bilaterally are  unchanged. No new pulmonary nodule or mass. No mediastinal, hilar, or  axillary adenopathy. No pleural or pericardial effusion. Mild coronary  calcifications. Small hiatal hernia. Thyroid unremarkable. No  suspicious bony lesions.         IMPRESSION: No evidence of progressive or recurrent malignancy in the  Chest.    CT 19:  Looks stable.  Official read pending.       IMPRESSION: Presumed NSCLC of left upper lobe,  cT1a N0M0 with radiographic complete response to SBRT.     RECOMMENDATIONS: Follow up in 1 year with CT chest. Continue close follow up with pulmonology for her lung function and COPD.    Nicole Burton NP  Department of Radiation Oncology  [facility]

## 2019-09-17 DIAGNOSIS — J44.9 COPD (CHRONIC OBSTRUCTIVE PULMONARY DISEASE) (H): ICD-10-CM

## 2019-09-17 DIAGNOSIS — J43.2 CENTRILOBULAR EMPHYSEMA (H): ICD-10-CM

## 2019-09-17 RX ORDER — ARFORMOTEROL TARTRATE 15 UG/2ML
15 SOLUTION RESPIRATORY (INHALATION) 2 TIMES DAILY
Qty: 120 ML | Refills: 8 | Status: SHIPPED | OUTPATIENT
Start: 2019-09-17 | End: 2019-09-21

## 2019-09-17 RX ORDER — BUDESONIDE 1 MG/2ML
1 INHALANT ORAL 2 TIMES DAILY
Qty: 60 AMPULE | Refills: 8 | Status: SHIPPED | OUTPATIENT
Start: 2019-09-17 | End: 2019-09-21

## 2019-09-21 ENCOUNTER — MYC REFILL (OUTPATIENT)
Dept: PULMONOLOGY | Facility: CLINIC | Age: 59
End: 2019-09-21

## 2019-09-21 DIAGNOSIS — J43.2 CENTRILOBULAR EMPHYSEMA (H): ICD-10-CM

## 2019-09-23 RX ORDER — BUDESONIDE 1 MG/2ML
1 INHALANT ORAL 2 TIMES DAILY
Qty: 60 AMPULE | Refills: 8 | Status: SHIPPED | OUTPATIENT
Start: 2019-09-23 | End: 2020-09-22

## 2019-09-23 RX ORDER — ARFORMOTEROL TARTRATE 15 UG/2ML
15 SOLUTION RESPIRATORY (INHALATION) 2 TIMES DAILY
Qty: 120 ML | Refills: 8 | Status: SHIPPED | OUTPATIENT
Start: 2019-09-23 | End: 2020-04-17

## 2019-09-24 ENCOUNTER — TELEPHONE (OUTPATIENT)
Dept: PULMONOLOGY | Facility: CLINIC | Age: 59
End: 2019-09-24

## 2019-10-22 ENCOUNTER — OFFICE VISIT (OUTPATIENT)
Dept: PULMONOLOGY | Facility: CLINIC | Age: 59
End: 2019-10-22
Attending: INTERNAL MEDICINE
Payer: MEDICARE

## 2019-10-22 VITALS
HEIGHT: 65 IN | DIASTOLIC BLOOD PRESSURE: 83 MMHG | WEIGHT: 184 LBS | BODY MASS INDEX: 30.66 KG/M2 | HEART RATE: 111 BPM | OXYGEN SATURATION: 98 % | SYSTOLIC BLOOD PRESSURE: 124 MMHG

## 2019-10-22 DIAGNOSIS — B37.0 ORAL THRUSH: ICD-10-CM

## 2019-10-22 DIAGNOSIS — J43.2 CENTRILOBULAR EMPHYSEMA (H): Primary | ICD-10-CM

## 2019-10-22 DIAGNOSIS — Z23 ENCOUNTER FOR IMMUNIZATION: ICD-10-CM

## 2019-10-22 LAB
EXPTIME-PRE: 8.61 SEC
FEF2575-%PRED-PRE: 6 %
FEF2575-PRE: 0.16 L/SEC
FEF2575-PRED: 2.32 L/SEC
FEFMAX-%PRED-PRE: 16 %
FEFMAX-PRE: 1.08 L/SEC
FEFMAX-PRED: 6.45 L/SEC
FEV1-%PRED-PRE: 13 %
FEV1-PRE: 0.35 L
FEV1FEV6-PRE: 32 %
FEV1FEV6-PRED: 81 %
FEV1FVC-PRE: 26 %
FEV1FVC-PRED: 79 %
FIFMAX-PRE: 2.29 L/SEC
FVC-%PRED-PRE: 40 %
FVC-PRE: 1.34 L
FVC-PRED: 3.28 L

## 2019-10-22 RX ORDER — ALBUTEROL SULFATE 90 UG/1
2 AEROSOL, METERED RESPIRATORY (INHALATION) EVERY 4 HOURS PRN
Qty: 18 G | Refills: 11 | Status: SHIPPED | OUTPATIENT
Start: 2019-10-22 | End: 2021-03-16

## 2019-10-22 ASSESSMENT — MIFFLIN-ST. JEOR: SCORE: 1405.5

## 2019-10-22 ASSESSMENT — PAIN SCALES - GENERAL: PAINLEVEL: NO PAIN (0)

## 2019-10-22 NOTE — PROGRESS NOTES
Reason for Visit  Mary Kay Deal is a 59 year old female who is being seen for Follow Up (copd)    Pulmonary HPI  The patient was seen and examined by Keith Mason MD     Mary Kay Deal is a 59 year old female with severe COPD on home O2. 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  She was empirically treated with SBRT for possible lung cancer (non biopsy proven) in the left UL in 8/2013. She has multiple pulmonary nodules.      Interval History   Overall since her last clinic visit she has been reasonably stable with regards to her severe pulmonary symptoms.  The last week and she had some increased wheezing and was started on prednisone taper. This is the only episode since the last clinic visit. She continues to do nebs twice a day and Spiriva consistently.  She has mostly dry cough. But otherwise she does not cough up on a regular basis.  Denies having any chest pains.    She is sleeping okay.  Denies any significant leg swelling.    O2 Requirements/Use:   She uses continuous flow when she is at rest at home and demand flow when she is out of the house. She leaves it at 4 LPM at rest. While she is walking she requires 4 LPM. When she is sleeping she uses 4 LPM supplemental O2.      Current Outpatient Medications   Medication     amLODIPine (NORVASC) 5 MG tablet     arformoterol (BROVANA) 15 MCG/2ML NEBU neb solution     azithromycin (ZITHROMAX) 250 MG tablet     budesonide (PULMICORT) 1 MG/2ML neb solution     Calcium (CALCIUM 600) 600 MG tablet     Chlorpheniramine-DM 4-20 MG TABS     Cholecalciferol (VITAMIN D) 1000 UNIT capsule     fluticasone (FLONASE) 50 MCG/ACT nasal spray     LORazepam (ATIVAN) 0.5 MG tablet     Misc. Devices (FINGERTIP PULSE OXIMETER) MISC     montelukast (SINGULAIR) 10 MG tablet     MULTIVITAMIN TABS   OR     omeprazole (PRILOSEC) 20 MG DR capsule     order for DME     ORDER FOR DME     predniSONE (DELTASONE) 10 MG tablet     Respiratory Therapy  Supplies (NEBULIZER COMPRESSOR) KIT     SENNA LAXATIVE OR     sertraline (ZOLOFT) 50 MG tablet     simvastatin (ZOCOR) 20 MG tablet     SPIRIVA HANDIHALER 18 MCG inhaled capsule     VENTOLIN  (90 Base) MCG/ACT Inhaler     No current facility-administered medications for this visit.        Allergies   Allergen Reactions     Penicillins Hives       Past Medical History:   Diagnosis Date     Alcohol dependence (H)     completed treatment in      Aspergillosis, with pneumonia (H)      Cancer (H)     questionable lung CA     Chronic infection     Known fungal lung condition     Constipation      Disorder of bone and cartilage, unspecified      Emphysema      GERD      Hx of radiation therapy     For lung mass     Mild intermittent asthma      Other dyspnea and respiratory abnormality      Other malaise and fatigue      SMOKER        Past Surgical History:   Procedure Laterality Date     BREAST SURGERY      Tumor removal at the age of 15     COLONOSCOPY N/A 2014    Procedure: COLONOSCOPY;  Surgeon: Patricia Perry MD;  Location:  OR     COLONOSCOPY N/A 2015    Procedure: COMBINED COLONOSCOPY, SINGLE OR MULTIPLE BIOPSY/POLYPECTOMY BY BIOPSY;  Surgeon: Patricia Perry MD;  Location:  GI     ENT SURGERY      Tonsillectomy     GYN SURGERY       x 1     SURGICAL HISTORY OF -       s/p Breast tumor @ age 15 - benign     SURGICAL HISTORY OF -       c section     SURGICAL HISTORY OF -       tonsillectomy       Social History     Socioeconomic History     Marital status:      Spouse name: Not on file     Number of children: Not on file     Years of education: Not on file     Highest education level: Not on file   Occupational History     Not on file   Social Needs     Financial resource strain: Not on file     Food insecurity:     Worry: Not on file     Inability: Not on file     Transportation needs:     Medical: Not on file     Non-medical: Not on file   Tobacco Use     Smoking  status: Former Smoker     Packs/day: 1.00     Years: 30.00     Pack years: 30.00     Types: Cigarettes     Last attempt to quit: 10/21/2005     Years since quittin.0     Smokeless tobacco: Never Used   Substance and Sexual Activity     Alcohol use: No     Alcohol/week: 0.0 standard drinks     Comment: sober since      Drug use: No     Sexual activity: Yes     Partners: Male     Comment: vas   Lifestyle     Physical activity:     Days per week: Not on file     Minutes per session: Not on file     Stress: Not on file   Relationships     Social connections:     Talks on phone: Not on file     Gets together: Not on file     Attends Anabaptist service: Not on file     Active member of club or organization: Not on file     Attends meetings of clubs or organizations: Not on file     Relationship status: Not on file     Intimate partner violence:     Fear of current or ex partner: Not on file     Emotionally abused: Not on file     Physically abused: Not on file     Forced sexual activity: Not on file   Other Topics Concern     Parent/sibling w/ CABG, MI or angioplasty before 65F 55M? Yes   Social History Narrative    , 3 children    Retired  at [employer]          The patient is currently on disability due to COPD.  She rides a scooter due to her lungs.  She used to work as a .  She and her  are living with their daughter in [place].  They have a [place] Corgi mix.  The patient stopped smoking 9 years ago.  She smoked 1-2 packs of cigarettes per day for 30 years, consistent with 45-60 pack year history.  She denies cigars, pipes or chewing tobacco.  She smoked marijuana in her teens.  The patient is an alcoholic and has been sober for the past 27 years.  Caffeine intake includes 4-5 cups of caffeinated coffee every morning.  She does not drink tea.  She consumes 1 can of Diet Pepsi as late as 7:00 p.m.  She eats chocolate candy bars as late as 7:00 p.m.  She does not exercise.  " She has a treadmill, so she could; however, as of this time she is not exercising.                ROS Pulmonary  Constitutional- Negative. She has been trying to watch her diet and eat healthier.  Eyes- Negative.  Ear, nose and throat- Negative.  Cardiac- Negative.  Pulm- See HPI  GI- Positive. Heartburn has been worse lately; continues to sleep with HOB elevation.   Genitourinary- Negative.  Musculoskeletal- Negative.   Neurology- Negative.  Dermatology- Negative.  Endocrine- Negative.  Lymphatic- Negative.  Psychiatry- Negative.  A complete ROS was otherwise negative except as noted in the HPI.      /83   Pulse 111   Ht 1.651 m (5' 5\")   Wt 83.5 kg (184 lb)   LMP 09/08/2004   SpO2 98%   BMI 30.62 kg/m   on O2 NC.  Body mass index is 30.62 kg/m .   Physical Exam  GENERAL APPEARANCE: Well developed, well nourished, alert, and in no apparent distress.  EYES: PERRL, EOMI  HENT: Nasal mucosa with no edema and no hyperemia. No nasal polyps.  EARS: Canals clear, TMs normal.  MOUTH: Oral mucosa is moist, without any lesions, no tonsillar enlargement, no oropharyngeal exudate.  NECK: Supple, no masses, no thyromegaly.  LYMPHATICS: No significant axillary, cervical, or supraclavicular nodes.  RESP: Normal percussion, very diminished air flow throughout. No crackles. No rhonchi. No wheezes.  CV: Normal S1, S2, regular rhythm, normal rate. No murmur. No rub. No gallop. No LE edema.      Results  Recent Results (from the past 168 hour(s))   General PFT Lab (Please always keep checked)    Collection Time: 10/22/19  3:57 PM   Result Value Ref Range    FVC-Pred 3.28 L    FVC-Pre 1.34 L    FVC-%Pred-Pre 40 %    FEV1-Pre 0.35 L    FEV1-%Pred-Pre 13 %    FEV1FVC-Pred 79 %    FEV1FVC-Pre 26 %    FEFMax-Pred 6.45 L/sec    FEFMax-Pre 1.08 L/sec    FEFMax-%Pred-Pre 16 %    FEF2575-Pred 2.32 L/sec    FEF2575-Pre 0.16 L/sec    IGU2559-%Pred-Pre 6 %    ExpTime-Pre 8.61 sec    FIFMax-Pre 2.29 L/sec    FEV1FEV6-Pred 81 %    " FEV1FEV6-Pre 32 %            Results as noted above.    PFT Interpretation:  Very severe obstructive ventilatory defect.  Unchanged from previous.   Similar to recent best.  Valid Maneuver    Chest CT (9/17/18): no evidence of malignancy.    6MWT (1/22/2016): Walked 460 ft on 4 lpm NC. Hence recommended to use this during the day with exertion and while sleeping. Can be on RA at rest.     Assessment and Plan: Mary Kay Deal is a 59-year-old female has a longstanding history of severe obstructive lung disease who comes today to the clinic for follow up.    1. RUL Mass /Aspergillus:  This lesion was biopsied by IR and it showed aspergillus and was treated with one month course of voriconazole. Due to lack of improvement it was stopped as per patient. This has remained stable. This was followed/managed by Dr. Pierre.    2. Emphysema: Very severe lung disease with Chronic hypoxic/hypercapneic resp failure.  - Will cont flu vaccine every year and has received pneumonia vaccine 5 years apart x2 doses. Received PCV 13 12/5/2014.  - Cont spiriva and prn nebs/inhalers. Cont Budesonide/Brovana nebs.  - Cont azithromycin daily.  - She would benefit from doing outpt pulm rehab but she is unable to do it due to lack of transportation. She has a treadmill at home and is currently using it to exercise.  - Cont prn lorazepam.  10/22/2019: FEV1 today (0.35) relatively stable from previous evaluation. Her pulmonary symptoms are stable. She leaves her oxygen at 4 LPM. Encouraged her to be more active to help with pulmonary function.    Consideration for Lung Transplant: Given her lung function, she may be candidate for a lung transplant. We discussed her case at conference. Given her phone numbers to call lung tx referral line.     Insomnia: Sleep study - no MADELIN noted (5/2014).    3. Pulmonary Nodules:    - Chest CT done on 08/3/12 showed a new left UL nodule that increased in size when followed. This was reviewed at Pulmonary nodule  conference and given the risk of doing biopsy and after seeing Dr. Lorenzo she underwent SBRT. SBRT 5000cGy in 5 fractions over 3 months - last dose on 08/29/13.  Last Chest CT was in 9/2018 - no evidence of malignancy.  --  She is followed by Radiation oncology and they will continue to determine appropriate imaging follow up. She will get a Chest CT in one month. We will at the minimum need yearly Chest CT scans.  10/1/2018: CT of chest on 9/17/2018 was stable and revealed no evidence of progressive or recurrent malignancy. As per pt Rad oncology would only do Chest CT annually.  10/22/2019: Chest CT due on 9/2020. Last CT in 9/2019 shows no evidence of recurrence of malignancy.    4. Allergies:   - NetiPot (nasal rinses) - unable to do them.   - Cont Singulair.  10/22/2019: Has ongoing rhinorrhoea. She is taking Flonase.    5. Obesity:   - Encouraged patient to eat healthier and gradually increase her exercise for weight loss.  10/22/2019: Body mass index is 30.62 kg/m .     6. GERD:   - Seen by GI for this and constipation. Recommended FODMAP diet trial.   - She had some dysphagia (pills getting stuck) hence had esophagagram on 1/2018 which showed mild dysmotility.   3/26/2018: She was instructed to take Prilosec 20 mg BID to better control her GERD.  10/22/2019: Heart burn is not well controlled. She is being scheduled for EGD. She has h/o constipation and is being scheduled for colonoscopy.    7. H/O Presumed Lung CA ([id number]): Continue follow up with Radiation oncology, they will decide about treatment regimen and CT's. This was Left UL lesion.  - TYPE OF RADIOTHERAPY GIVEN: SBRT 5000cGy in 5 fractions, 6MV, 80%IDL  - Currently on annual chest CT regimen - due in Fall of every year.    8. Health Care Maintenance:  4/1/2019:  Consider Shingrix and TDaP vaccinations in the future.      F/U in 6 months with spirometry.

## 2019-10-22 NOTE — NURSING NOTE
Chief Complaint   Patient presents with     Follow Up     copd     Vitals were taken and medications were reconciled.     ROSALIND Lyles

## 2019-10-22 NOTE — LETTER
10/22/2019       RE: Mary Kay Deal  [address] [place] [address]     Dear Colleague,    Thank you for referring your patient, Mary Kay Deal, to the [facility] at [facility]. Please see a copy of my visit note below.    Reason for Visit  Mary Kay Deal is a 59 year old female who is being seen for Follow Up (copd)    Pulmonary HPI  The patient was seen and examined by Keith Mason MD     Mary Kay Deal is a 59 year old female with severe COPD on home O2. 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  She was empirically treated with SBRT for possible lung cancer (non biopsy proven) in the left UL in 8/2013. She has multiple pulmonary nodules.      Interval History   Overall since her last clinic visit she has been reasonably stable with regards to her severe pulmonary symptoms.  The last week and she had some increased wheezing and was started on prednisone taper. This is the only episode since the last clinic visit. She continues to do nebs twice a day and Spiriva consistently.  She has mostly dry cough. But otherwise she does not cough up on a regular basis.  Denies having any chest pains.    She is sleeping okay.  Denies any significant leg swelling.    O2 Requirements/Use:   She uses continuous flow when she is at rest at home and demand flow when she is out of the house. She leaves it at 4 LPM at rest. While she is walking she requires 4 LPM. When she is sleeping she uses 4 LPM supplemental O2.      Current Outpatient Medications   Medication     amLODIPine (NORVASC) 5 MG tablet     arformoterol (BROVANA) 15 MCG/2ML NEBU neb solution     azithromycin (ZITHROMAX) 250 MG tablet     budesonide (PULMICORT) 1 MG/2ML neb solution     Calcium (CALCIUM 600) 600 MG tablet     Chlorpheniramine-DM 4-20 MG TABS     Cholecalciferol (VITAMIN D) 1000 UNIT capsule     fluticasone (FLONASE) 50 MCG/ACT nasal spray      LORazepam (ATIVAN) 0.5 MG tablet     Misc. Devices (FINGERTIP PULSE OXIMETER) MISC     montelukast (SINGULAIR) 10 MG tablet     MULTIVITAMIN TABS   OR     omeprazole (PRILOSEC) 20 MG DR capsule     order for DME     ORDER FOR DME     predniSONE (DELTASONE) 10 MG tablet     Respiratory Therapy Supplies (NEBULIZER COMPRESSOR) KIT     SENNA LAXATIVE OR     sertraline (ZOLOFT) 50 MG tablet     simvastatin (ZOCOR) 20 MG tablet     SPIRIVA HANDIHALER 18 MCG inhaled capsule     VENTOLIN  (90 Base) MCG/ACT Inhaler     No current facility-administered medications for this visit.        Allergies   Allergen Reactions     Penicillins Hives       Past Medical History:   Diagnosis Date     Alcohol dependence (H)     completed treatment in      Aspergillosis, with pneumonia (H)      Cancer (H)     questionable lung CA     Chronic infection     Known fungal lung condition     Constipation      Disorder of bone and cartilage, unspecified      Emphysema      GERD      Hx of radiation therapy     For lung mass     Mild intermittent asthma      Other dyspnea and respiratory abnormality      Other malaise and fatigue      SMOKER        Past Surgical History:   Procedure Laterality Date     BREAST SURGERY      Tumor removal at the age of 15     COLONOSCOPY N/A 2014    Procedure: COLONOSCOPY;  Surgeon: Patricia Perry MD;  Location:  OR     COLONOSCOPY N/A 2015    Procedure: COMBINED COLONOSCOPY, SINGLE OR MULTIPLE BIOPSY/POLYPECTOMY BY BIOPSY;  Surgeon: Patricia Perry MD;  Location:  GI     ENT SURGERY      Tonsillectomy     GYN SURGERY       x 1     SURGICAL HISTORY OF -       s/p Breast tumor @ age 15 - benign     SURGICAL HISTORY OF -       c section     SURGICAL HISTORY OF -       tonsillectomy       Social History     Socioeconomic History     Marital status:      Spouse name: Not on file     Number of children: Not on file     Years of education: Not on file     Highest  education level: Not on file   Occupational History     Not on file   Social Needs     Financial resource strain: Not on file     Food insecurity:     Worry: Not on file     Inability: Not on file     Transportation needs:     Medical: Not on file     Non-medical: Not on file   Tobacco Use     Smoking status: Former Smoker     Packs/day: 1.00     Years: 30.00     Pack years: 30.00     Types: Cigarettes     Last attempt to quit: 10/21/2005     Years since quittin.0     Smokeless tobacco: Never Used   Substance and Sexual Activity     Alcohol use: No     Alcohol/week: 0.0 standard drinks     Comment: sober since      Drug use: No     Sexual activity: Yes     Partners: Male     Comment: vas   Lifestyle     Physical activity:     Days per week: Not on file     Minutes per session: Not on file     Stress: Not on file   Relationships     Social connections:     Talks on phone: Not on file     Gets together: Not on file     Attends Sabianism service: Not on file     Active member of club or organization: Not on file     Attends meetings of clubs or organizations: Not on file     Relationship status: Not on file     Intimate partner violence:     Fear of current or ex partner: Not on file     Emotionally abused: Not on file     Physically abused: Not on file     Forced sexual activity: Not on file   Other Topics Concern     Parent/sibling w/ CABG, MI or angioplasty before 65F 55M? Yes   Social History Narrative    , 3 children    Retired  at [employer]          The patient is currently on disability due to COPD.  She rides a scooter due to her lungs.  She used to work as a .  She and her  are living with their daughter in [place].  They have a [place] Corgi mix.  The patient stopped smoking 9 years ago.  She smoked 1-2 packs of cigarettes per day for 30 years, consistent with 45-60 pack year history.  She denies cigars, pipes or chewing tobacco.  She smoked marijuana in her  "teens.  The patient is an alcoholic and has been sober for the past 27 years.  Caffeine intake includes 4-5 cups of caffeinated coffee every morning.  She does not drink tea.  She consumes 1 can of Diet Pepsi as late as 7:00 p.m.  She eats chocolate candy bars as late as 7:00 p.m.  She does not exercise.  She has a treadmill, so she could; however, as of this time she is not exercising.                ROS Pulmonary  Constitutional- Negative. She has been trying to watch her diet and eat healthier.  Eyes- Negative.  Ear, nose and throat- Negative.  Cardiac- Negative.  Pulm- See HPI  GI- Positive. Heartburn has been worse lately; continues to sleep with HOB elevation.   Genitourinary- Negative.  Musculoskeletal- Negative.   Neurology- Negative.  Dermatology- Negative.  Endocrine- Negative.  Lymphatic- Negative.  Psychiatry- Negative.  A complete ROS was otherwise negative except as noted in the HPI.      /83   Pulse 111   Ht 1.651 m (5' 5\")   Wt 83.5 kg (184 lb)   LMP 09/08/2004   SpO2 98%   BMI 30.62 kg/m    on O2 NC.  Body mass index is 30.62 kg/m .   Physical Exam  GENERAL APPEARANCE: Well developed, well nourished, alert, and in no apparent distress.  EYES: PERRL, EOMI  HENT: Nasal mucosa with no edema and no hyperemia. No nasal polyps.  EARS: Canals clear, TMs normal.  MOUTH: Oral mucosa is moist, without any lesions, no tonsillar enlargement, no oropharyngeal exudate.  NECK: Supple, no masses, no thyromegaly.  LYMPHATICS: No significant axillary, cervical, or supraclavicular nodes.  RESP: Normal percussion, very diminished air flow throughout. No crackles. No rhonchi. No wheezes.  CV: Normal S1, S2, regular rhythm, normal rate. No murmur. No rub. No gallop. No LE edema.      Results  Recent Results (from the past 168 hour(s))   General PFT Lab (Please always keep checked)    Collection Time: 10/22/19  3:57 PM   Result Value Ref Range    FVC-Pred 3.28 L    FVC-Pre 1.34 L    FVC-%Pred-Pre 40 %    " FEV1-Pre 0.35 L    FEV1-%Pred-Pre 13 %    FEV1FVC-Pred 79 %    FEV1FVC-Pre 26 %    FEFMax-Pred 6.45 L/sec    FEFMax-Pre 1.08 L/sec    FEFMax-%Pred-Pre 16 %    FEF2575-Pred 2.32 L/sec    FEF2575-Pre 0.16 L/sec    RBQ6708-%Pred-Pre 6 %    ExpTime-Pre 8.61 sec    FIFMax-Pre 2.29 L/sec    FEV1FEV6-Pred 81 %    FEV1FEV6-Pre 32 %            Results as noted above.    PFT Interpretation:  Very severe obstructive ventilatory defect.  Unchanged from previous.   Similar to recent best.  Valid Maneuver    Chest CT (9/17/18): no evidence of malignancy.    6MWT (1/22/2016): Walked 460 ft on 4 lpm NC. Hence recommended to use this during the day with exertion and while sleeping. Can be on RA at rest.     Assessment and Plan: Mary Kay Deal is a 59-year-old female has a longstanding history of severe obstructive lung disease who comes today to the clinic for follow up.    1. RUL Mass /Aspergillus:  This lesion was biopsied by IR and it showed aspergillus and was treated with one month course of voriconazole. Due to lack of improvement it was stopped as per patient. This has remained stable. This was followed/managed by Dr. Pierre.    2. Emphysema: Very severe lung disease with Chronic hypoxic/hypercapneic resp failure.  - Will cont flu vaccine every year and has received pneumonia vaccine 5 years apart x2 doses. Received PCV 13 12/5/2014.  - Cont spiriva and prn nebs/inhalers. Cont Budesonide/Brovana nebs.  - Cont azithromycin daily.  - She would benefit from doing outpt pulm rehab but she is unable to do it due to lack of transportation. She has a treadmill at home and is currently using it to exercise.  - Cont prn lorazepam.  10/22/2019: FEV1 today (0.35) relatively stable from previous evaluation. Her pulmonary symptoms are stable. She leaves her oxygen at 4 LPM. Encouraged her to be more active to help with pulmonary function.    Consideration for Lung Transplant: Given her lung function, she may be candidate for a lung  transplant. We discussed her case at conference. Given her phone numbers to call lung tx referral line.     Insomnia: Sleep study - no MADELIN noted (5/2014).    3. Pulmonary Nodules:    - Chest CT done on 08/3/12 showed a new left UL nodule that increased in size when followed. This was reviewed at Pulmonary nodule conference and given the risk of doing biopsy and after seeing Dr. Lorenzo she underwent SBRT. SBRT 5000cGy in 5 fractions over 3 months - last dose on 08/29/13.  Last Chest CT was in 9/2018 - no evidence of malignancy.  --  She is followed by Radiation oncology and they will continue to determine appropriate imaging follow up. She will get a Chest CT in one month. We will at the minimum need yearly Chest CT scans.  10/1/2018: CT of chest on 9/17/2018 was stable and revealed no evidence of progressive or recurrent malignancy. As per pt Rad oncology would only do Chest CT annually.  10/22/2019: Chest CT due on 9/2020. Last CT in 9/2019 shows no evidence of recurrence of malignancy.    4. Allergies:   - NetiPot (nasal rinses) - unable to do them.   - Cont Singulair.  10/22/2019: Has ongoing rhinorrhoea. She is taking Flonase.    5. Obesity:   - Encouraged patient to eat healthier and gradually increase her exercise for weight loss.  10/22/2019: Body mass index is 30.62 kg/m .     6. GERD:   - Seen by GI for this and constipation. Recommended FODMAP diet trial.   - She had some dysphagia (pills getting stuck) hence had esophagagram on 1/2018 which showed mild dysmotility.   3/26/2018: She was instructed to take Prilosec 20 mg BID to better control her GERD.  10/22/2019: Heart burn is not well controlled. She is being scheduled for EGD. She has h/o constipation and is being scheduled for colonoscopy.    7. H/O Presumed Lung CA ([id number]): Continue follow up with Radiation oncology, they will decide about treatment regimen and CT's. This was Left UL lesion.  - TYPE OF RADIOTHERAPY GIVEN: SBRT 5000cGy in 5  fractions, 6MV, 80%IDL  - Currently on annual chest CT regimen - due in Fall of every year.    8. Health Care Maintenance:  4/1/2019:  Consider Shingrix and TDaP vaccinations in the future.      F/U in 6 months with spirometry.      Again, thank you for allowing me to participate in the care of your patient.      Sincerely,    Keith Mason MD

## 2019-11-03 ENCOUNTER — HEALTH MAINTENANCE LETTER (OUTPATIENT)
Age: 59
End: 2019-11-03

## 2019-12-23 DIAGNOSIS — F41.9 ANXIETY: ICD-10-CM

## 2019-12-23 NOTE — TELEPHONE ENCOUNTER
Requested Prescriptions   Pending Prescriptions Disp Refills     LORazepam (ATIVAN) 0.5 MG tablet [Pharmacy Med Name: LORAZEPAM 0.5MG TABS] 90 tablet 3     Sig: TAKE ONE TABLET BY MOUTH EVERY 8 HOURS AS NEEDED FOR ANXIETY       There is no refill protocol information for this order        LORazepam (ATIVAN) 0.5 MG tablet 90 tablet 3 6/10/2019  No   Sig - Route: Take 1 tablet (0.5 mg) by mouth every 8 hours as needed for anxiety - Oral     Last Written Prescription Date:  6-  Last Fill Quantity: 90,  # refills: 3   Last office visit: 9/4/2019 with prescribing provider:     Future Office Visit:  Unknown    Problem List Complete:  Yes    THE MOST RECENT OFFICE VISIT MUST BE WITHIN THE PAST 3 MONTHS. AT LEAST ONE FACE TO FACE VISIT MUST OCCUR EVERY 6 MONTHS. ADDITIONAL VISITS CAN BE VIRTUAL.  (THIS STATEMENT SHOULD BE DELETED.)    Last Office Visit with [facility] primary care provider: 9-4-2019    Future Office visit:     Controlled substance agreement:   Encounter-Level CSA:    There are no encounter-level csa.     Patient-Level CSA:    There are no patient-level csa.         Last Urine Drug Screen: No results found for: CDAUT, No results found for: COMDAT, No results found for: THC13, PCP13, COC13, MAMP13, OPI13, AMP13, BZO13, TCA13, MTD13, BAR13, OXY13, PPX13, BUP13     Processing:  Rx to be electronically transmitted to pharmacy by provider     [email or web address]   checked in past 3 months?  No, route to RN

## 2019-12-24 RX ORDER — LORAZEPAM 0.5 MG/1
TABLET ORAL
Qty: 90 TABLET | Refills: 3 | Status: SHIPPED | OUTPATIENT
Start: 2019-12-24 | End: 2020-03-19

## 2019-12-24 NOTE — TELEPHONE ENCOUNTER
Patient is followed by DAVID SMILEY for ongoing prescription of benzodiazepines.  All refills should be approved by this provider, or covering partner.    Medication(s): Lorazepam 0.5mg.   Maximum quantity per month: #90  Clinic visit frequency required: Q 6  months     Controlled substance agreement on file: No  Benzodiazepine use reviewed by psychiatry:  No    Last [facility] website verification:  Done on 12/24/19 no concerns  [email or web address]

## 2020-02-10 ENCOUNTER — HEALTH MAINTENANCE LETTER (OUTPATIENT)
Age: 60
End: 2020-02-10

## 2020-03-12 DIAGNOSIS — J43.2 CENTRILOBULAR EMPHYSEMA (H): ICD-10-CM

## 2020-03-12 RX ORDER — TIOTROPIUM BROMIDE 18 UG/1
CAPSULE ORAL; RESPIRATORY (INHALATION)
Qty: 90 CAPSULE | Refills: 3 | Status: SHIPPED | OUTPATIENT
Start: 2020-03-12 | End: 2021-03-16

## 2020-03-12 RX ORDER — MONTELUKAST SODIUM 10 MG/1
TABLET ORAL
Qty: 30 TABLET | Refills: 11 | Status: SHIPPED | OUTPATIENT
Start: 2020-03-12 | End: 2021-03-16

## 2020-03-12 RX ORDER — AZITHROMYCIN 250 MG/1
TABLET, FILM COATED ORAL
Qty: 30 TABLET | Refills: 11 | Status: SHIPPED | OUTPATIENT
Start: 2020-03-12 | End: 2020-07-10

## 2020-03-14 DIAGNOSIS — J43.2 CENTRILOBULAR EMPHYSEMA (H): ICD-10-CM

## 2020-03-19 ENCOUNTER — MYC REFILL (OUTPATIENT)
Dept: FAMILY MEDICINE | Facility: CLINIC | Age: 60
End: 2020-03-19

## 2020-03-19 ENCOUNTER — MYC REFILL (OUTPATIENT)
Dept: PULMONOLOGY | Facility: CLINIC | Age: 60
End: 2020-03-19

## 2020-03-19 DIAGNOSIS — K21.00 GASTROESOPHAGEAL REFLUX DISEASE WITH ESOPHAGITIS: ICD-10-CM

## 2020-03-19 DIAGNOSIS — F32.0 MAJOR DEPRESSIVE DISORDER, SINGLE EPISODE, MILD (H): ICD-10-CM

## 2020-03-19 DIAGNOSIS — F41.9 ANXIETY: ICD-10-CM

## 2020-03-19 DIAGNOSIS — E78.5 HYPERLIPIDEMIA LDL GOAL <130: ICD-10-CM

## 2020-03-19 DIAGNOSIS — J43.2 CENTRILOBULAR EMPHYSEMA (H): ICD-10-CM

## 2020-03-19 RX ORDER — FLUTICASONE PROPIONATE 50 MCG
1 SPRAY, SUSPENSION (ML) NASAL 2 TIMES DAILY
Qty: 16 G | Refills: 11 | Status: SHIPPED | OUTPATIENT
Start: 2020-03-19 | End: 2021-03-16

## 2020-03-20 RX ORDER — DOXYCYCLINE 100 MG/1
100 CAPSULE ORAL 2 TIMES DAILY
Qty: 28 CAPSULE | Refills: 0 | Status: SHIPPED | OUTPATIENT
Start: 2020-03-20 | End: 2020-07-29

## 2020-03-20 RX ORDER — SIMVASTATIN 20 MG
TABLET ORAL
Qty: 90 TABLET | Refills: 3 | Status: SHIPPED | OUTPATIENT
Start: 2020-03-20 | End: 2021-01-01

## 2020-03-20 NOTE — TELEPHONE ENCOUNTER
Pt requesting abx to have on hand for exacerbation.  Reviewed with Dr. Mason and Rx for doxycycline 100 mg bid X 14 days provided.  Pt instructed to call clinic if she starts this medication. She may continue to use azithromycin while she is taking doxy.

## 2020-03-23 RX ORDER — LORAZEPAM 0.5 MG/1
TABLET ORAL
Qty: 90 TABLET | Refills: 3 | Status: SHIPPED | OUTPATIENT
Start: 2020-03-23 | End: 2021-01-01

## 2020-03-23 NOTE — TELEPHONE ENCOUNTER
Message left for patient to call back to schedule telephone visit with Dr. Godoy to review medications and refill Lorazepam.    Anthony Greene CMA on 3/23/2020 at 10:19 AM

## 2020-04-06 DIAGNOSIS — J43.2 CENTRILOBULAR EMPHYSEMA (H): ICD-10-CM

## 2020-04-06 RX ORDER — FLUTICASONE PROPIONATE 50 MCG
SPRAY, SUSPENSION (ML) NASAL
Qty: 16 G | Refills: 11 | OUTPATIENT
Start: 2020-04-06

## 2020-04-06 RX ORDER — AZITHROMYCIN 250 MG/1
TABLET, FILM COATED ORAL
Qty: 30 TABLET | Refills: 11 | OUTPATIENT
Start: 2020-04-06

## 2020-04-06 RX ORDER — MONTELUKAST SODIUM 10 MG/1
TABLET ORAL
Qty: 30 TABLET | Refills: 11 | OUTPATIENT
Start: 2020-04-06

## 2020-04-14 ENCOUNTER — VIRTUAL VISIT (OUTPATIENT)
Dept: PULMONOLOGY | Facility: CLINIC | Age: 60
End: 2020-04-14
Attending: INTERNAL MEDICINE
Payer: MEDICARE

## 2020-04-14 DIAGNOSIS — F41.9 ANXIETY: ICD-10-CM

## 2020-04-14 DIAGNOSIS — J43.2 CENTRILOBULAR EMPHYSEMA (H): ICD-10-CM

## 2020-04-14 DIAGNOSIS — R91.8 PULMONARY NODULES: Primary | ICD-10-CM

## 2020-04-14 NOTE — PROGRESS NOTES
"Mary Kay Deal is a 59 year old female who is being evaluated via a billable telephone visit.      The patient has been notified of following:     \"This telephone visit will be conducted via a call between you and your physician/provider. We have found that certain health care needs can be provided without the need for a physical exam.  This service lets us provide the care you need with a short phone conversation.  If a prescription is necessary we can send it directly to your pharmacy.  If lab work is needed we can place an order for that and you can then stop by our lab to have the test done at a later time.    Telephone visits are billed at different rates depending on your insurance coverage. During this emergency period, for some insurers they may be billed the same as an in-person visit.  Please reach out to your insurance provider with any questions.    If during the course of the call the physician/provider feels a telephone visit is not appropriate, you will not be charged for this service.\"    Patient has given verbal consent for Telephone visit?  Yes    How would you like to obtain your AVS? MyChart    Phone call duration: 13 minutes    Keith Mason MD            Reason for Visit  Mary Kay Deal is a 59 year old female who is being seen for No chief complaint on file.    Pulmonary HPI  The patient was seen and examined by Keith Mason MD     Mary Kay Deal is a 59 year old female with severe COPD on home O2. 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  She was empirically treated with SBRT for possible lung cancer (non biopsy proven) in the left UL in 8/2013. She has multiple pulmonary nodules.    She typically has 1 - 2 exacerbations per yr. Last exacerbation was on 10/22/19.      Interval History   Her main complaint today was the sensation of food get \"stuck\" in the throat. She feels that it is stuck and it comes up later. This sx has been going on for many " years and it was worse of late. She was seen for this and nothing was noted on exam per pt. These sx wax and wane.      With regards to her pulm sx it was worse about two weeks ago and took two days of oral steroids and felt better. She feels her SOB is better (back to baseline). She is not excercising as much. She was more congested and was coughing up crap (thick yellow).     Her sinuses are draining on Rt. side.     She is sleeping okay.  Denies any significant leg swelling.    O2 Requirements/Use:   She uses continuous flow when she is at rest at home and demand flow when she is out of the house. She leaves it at 4 LPM at rest. While she is walking she requires 4 LPM. When she is sleeping she uses 4 LPM supplemental O2.      Current Outpatient Medications   Medication     albuterol (VENTOLIN HFA) 108 (90 Base) MCG/ACT inhaler     amLODIPine (NORVASC) 5 MG tablet     arformoterol (BROVANA) 15 MCG/2ML NEBU neb solution     azithromycin (ZITHROMAX) 250 MG tablet     budesonide (PULMICORT) 1 MG/2ML neb solution     Calcium (CALCIUM 600) 600 MG tablet     Chlorpheniramine-DM 4-20 MG TABS     Cholecalciferol (VITAMIN D) 1000 UNIT capsule     fluticasone (FLONASE) 50 MCG/ACT nasal spray     LORazepam (ATIVAN) 0.5 MG tablet     Misc. Devices (FINGERTIP PULSE OXIMETER) MISC     montelukast (SINGULAIR) 10 MG tablet     MULTIVITAMIN TABS   OR     omeprazole (PRILOSEC) 20 MG DR capsule     order for DME     ORDER FOR DME     predniSONE (DELTASONE) 10 MG tablet     Respiratory Therapy Supplies (NEBULIZER COMPRESSOR) KIT     SENNA LAXATIVE OR     sertraline (ZOLOFT) 50 MG tablet     simvastatin (ZOCOR) 20 MG tablet     SPIRIVA HANDIHALER 18 MCG inhaled capsule     No current facility-administered medications for this visit.        Allergies   Allergen Reactions     Penicillins Hives       Past Medical History:   Diagnosis Date     Alcohol dependence (H)     completed treatment in 1986     Aspergillosis, with pneumonia (H)       Cancer (H)     questionable lung CA     Chronic infection     Known fungal lung condition     Constipation      Disorder of bone and cartilage, unspecified      Emphysema      GERD      Hx of radiation therapy     For lung mass     Mild intermittent asthma      Other dyspnea and respiratory abnormality      Other malaise and fatigue      SMOKER        Past Surgical History:   Procedure Laterality Date     BREAST SURGERY      Tumor removal at the age of 15     COLONOSCOPY N/A 2014    Procedure: COLONOSCOPY;  Surgeon: Patricia Perry MD;  Location:  OR     COLONOSCOPY N/A 2015    Procedure: COMBINED COLONOSCOPY, SINGLE OR MULTIPLE BIOPSY/POLYPECTOMY BY BIOPSY;  Surgeon: Patricia Perry MD;  Location:  GI     ENT SURGERY      Tonsillectomy     GYN SURGERY       x 1     SURGICAL HISTORY OF -       s/p Breast tumor @ age 15 - benign     SURGICAL HISTORY OF -       c section     SURGICAL HISTORY OF -       tonsillectomy       Social History     Socioeconomic History     Marital status:      Spouse name: Not on file     Number of children: Not on file     Years of education: Not on file     Highest education level: Not on file   Occupational History     Not on file   Social Needs     Financial resource strain: Not on file     Food insecurity     Worry: Not on file     Inability: Not on file     Transportation needs     Medical: Not on file     Non-medical: Not on file   Tobacco Use     Smoking status: Former Smoker     Packs/day: 1.00     Years: 30.00     Pack years: 30.00     Types: Cigarettes     Last attempt to quit: 10/21/2005     Years since quittin.4     Smokeless tobacco: Never Used   Substance and Sexual Activity     Alcohol use: No     Alcohol/week: 0.0 standard drinks     Comment: sober since      Drug use: No     Sexual activity: Yes     Partners: Male     Comment: vas   Lifestyle     Physical activity     Days per week: Not on file     Minutes per session:  Not on file     Stress: Not on file   Relationships     Social connections     Talks on phone: Not on file     Gets together: Not on file     Attends Jain service: Not on file     Active member of club or organization: Not on file     Attends meetings of clubs or organizations: Not on file     Relationship status: Not on file     Intimate partner violence     Fear of current or ex partner: Not on file     Emotionally abused: Not on file     Physically abused: Not on file     Forced sexual activity: Not on file   Other Topics Concern     Parent/sibling w/ CABG, MI or angioplasty before 65F 55M? Yes   Social History Narrative    , 3 children    Retired  at [employer]          The patient is currently on disability due to COPD.  She rides a scooter due to her lungs.  She used to work as a .  She and her  are living with their daughter in [place].  They have a Kinyarwanda Corgi mix.  The patient stopped smoking 9 years ago.  She smoked 1-2 packs of cigarettes per day for 30 years, consistent with 45-60 pack year history.  She denies cigars, pipes or chewing tobacco.  She smoked marijuana in her teens.  The patient is an alcoholic and has been sober for the past 27 years.  Caffeine intake includes 4-5 cups of caffeinated coffee every morning.  She does not drink tea.  She consumes 1 can of Diet Pepsi as late as 7:00 p.m.  She eats chocolate candy bars as late as 7:00 p.m.  She does not exercise.  She has a treadmill, so she could; however, as of this time she is not exercising.                ROS Pulmonary  Constitutional- Negative. She has been trying to watch her diet and eat healthier.  Eyes- Negative.  Ear, nose and throat- Negative.  Cardiac- Negative.  Pulm- See HPI  GI- Positive. Heartburn has been worse lately; continues to sleep with HOB elevation.   Genitourinary- Negative.  Musculoskeletal- Muscle cramps with PPI BID hence taking it once daily. She continues to have  heartburn daily.  Neurology- Negative.  Dermatology- Negative.  Endocrine- Negative.  Lymphatic- Negative.  Psychiatry- She is using ativan prn basis and sometimes at night. She was using it more often two weeks ago.    A complete ROS was otherwise negative except as noted in the HPI.      Results  No results found for this or any previous visit (from the past 168 hour(s)).    Chest CT (9/17/18): no evidence of malignancy.    6MWT (1/22/2016): Walked 460 ft on 4 lpm NC. Hence recommended to use this during the day with exertion and while sleeping. Can be on RA at rest.     Assessment and Plan: Mary Kay Deal is a 59-year-old female has a longstanding history of severe obstructive lung disease who comes today to the clinic for follow up.    1. RUL Mass /Aspergillus:  This lesion was biopsied by IR and it showed aspergillus and was treated with one month course of voriconazole. Due to lack of improvement it was stopped as per patient. This has remained stable. This was followed/managed by Dr. Pierre.    2. Emphysema: Very severe lung disease with Chronic hypoxic/hypercapneic resp failure.  - Will cont flu vaccine every year and has received pneumonia vaccine 5 years apart x2 doses. Received PCV 13 12/5/2014.  - Cont spiriva and prn nebs/inhalers. Cont Budesonide/Brovana nebs.  - Cont azithromycin daily.  - She would benefit from doing outpt pulm rehab but she is unable to do it due to lack of transportation. She has a treadmill at home and is currently using it to exercise.  - Cont prn lorazepam.    4/14/2020: Her pulmonary symptoms are stable. She will continue at 4 LPM.     Consideration for Lung Transplant: Given her lung function, she may be candidate for a lung transplant. We discussed her case at conference. Will advise her to call lung tx referral line.     Insomnia: Sleep study - no MADELIN noted (5/2014).    3. Pulmonary Nodules:    - Chest CT done on 08/3/12 showed a new left UL nodule that increased in size when  followed. This was reviewed at Pulmonary nodule conference and given the risk of doing biopsy and after seeing Dr. Lorenzo she underwent SBRT. SBRT 5000cGy in 5 fractions over 3 months - last dose on 08/29/13.  Last Chest CT was in 9/2018 - no evidence of malignancy.  --  She is followed by Radiation oncology and they will continue to determine appropriate imaging follow up. She will get a Chest CT in one month. We will at the minimum need yearly Chest CT scans.  10/1/2018: CT of chest on 9/17/2018 was stable and revealed no evidence of progressive or recurrent malignancy. As per pt Rad oncology would only do Chest CT annually.  4/14/2020: Chest CT due on 9/2020. Last CT in 9/2019 shows no evidence of recurrence of malignancy.    4. Allergies:   - NetiPot (nasal rinses) - unable to do them.   - Cont Singulair.  4/14/2020: Still has ongoing rhinorrhoea. She is taking Flonase.    5. Obesity:   - Encouraged patient to eat healthier and gradually increase her exercise for weight loss.    6. GERD:   - Seen by GI for this and constipation. Recommended FODMAP diet trial.   - She had some dysphagia (pills getting stuck) hence had esophagagram on 1/2018 which showed mild dysmotility.    - EGD (11/2019): Medium sized hiatal hernia noted. few gastric polyps. Remain on PPI for life.  3/26/2018: She was instructed to take Prilosec 20 mg BID to better control her GERD.  4/14/2020: Heart burn is not well controlled. She feels on PPI BID has more cramps. She will try taking PPI at night. If the sx are unchanged in the next two weeks then start Pepcid 20mg BID.     7. Dysphagia: Etiology unclear. As mentioned has had esophagogram, EGD which were negative. We will consider doing speech pathology consultation. If negative then will consider GI consultation.    8. H/O Presumed Lung CA ([id number]): Continue follow up with Radiation oncology, they will decide about treatment regimen and CT's. This was Left UL lesion.  - TYPE OF  RADIOTHERAPY GIVEN: SBRT 5000cGy in 5 fractions, 6MV, 80%IDL  - Currently on annual chest CT regimen - due in Fall of every year.    9. Health Care Maintenance:  4/1/2019:  Consider Shingrix and TDaP vaccinations in the future.      F/U in 5 months with spirometry and chest CT.

## 2020-04-14 NOTE — PATIENT INSTRUCTIONS
1. Please call Lung transplant referral to initiate an evaluation. The number is [phone], Option #1    2. Switch to taking prilosec to taking at night. if the heartburn is not better in two to four weeks then start taking pepcid 20mg twice daily.

## 2020-04-17 DIAGNOSIS — J43.2 CENTRILOBULAR EMPHYSEMA (H): ICD-10-CM

## 2020-04-17 RX ORDER — BUDESONIDE 0.5 MG/2ML
INHALANT ORAL
Qty: 60 AMPULE | Refills: 11 | Status: SHIPPED | OUTPATIENT
Start: 2020-04-17 | End: 2021-03-16

## 2020-04-17 RX ORDER — ARFORMOTEROL TARTRATE 15 UG/2ML
SOLUTION RESPIRATORY (INHALATION)
Qty: 120 ML | Refills: 11 | Status: SHIPPED | OUTPATIENT
Start: 2020-04-17 | End: 2021-03-16

## 2020-06-30 NOTE — TELEPHONE ENCOUNTER
I spoke to Mary Kay and informed her of her essentially normal abdominal ultrasound.  I recommended that she follow-up with an appointment this week with myself.    Can we schedule her - OK to overbook?    Doug Godoy MD      Left message for patient to call office .

## 2020-07-10 DIAGNOSIS — J43.2 CENTRILOBULAR EMPHYSEMA (H): ICD-10-CM

## 2020-07-10 RX ORDER — AZITHROMYCIN 250 MG/1
250 TABLET, FILM COATED ORAL DAILY
Qty: 30 TABLET | Refills: 11 | Status: SHIPPED | OUTPATIENT
Start: 2020-07-10 | End: 2021-03-16

## 2020-07-13 DIAGNOSIS — J44.9 CHRONIC OBSTRUCTIVE PULMONARY DISEASE, UNSPECIFIED COPD TYPE (H): ICD-10-CM

## 2020-07-13 RX ORDER — PREDNISONE 10 MG/1
TABLET ORAL
Qty: 23 TABLET | Refills: 1 | OUTPATIENT
Start: 2020-07-13

## 2020-07-29 ENCOUNTER — MYC REFILL (OUTPATIENT)
Dept: PULMONOLOGY | Facility: CLINIC | Age: 60
End: 2020-07-29

## 2020-07-29 ENCOUNTER — TELEPHONE (OUTPATIENT)
Dept: PULMONOLOGY | Facility: CLINIC | Age: 60
End: 2020-07-29

## 2020-07-29 DIAGNOSIS — J43.2 CENTRILOBULAR EMPHYSEMA (H): ICD-10-CM

## 2020-07-29 DIAGNOSIS — J44.9 CHRONIC OBSTRUCTIVE PULMONARY DISEASE, UNSPECIFIED COPD TYPE (H): ICD-10-CM

## 2020-07-29 RX ORDER — DOXYCYCLINE 100 MG/1
100 CAPSULE ORAL 2 TIMES DAILY
Qty: 28 CAPSULE | Refills: 0 | Status: SHIPPED | OUTPATIENT
Start: 2020-07-29 | End: 2020-09-22

## 2020-07-29 RX ORDER — PREDNISONE 10 MG/1
TABLET ORAL
Qty: 23 TABLET | Refills: 1 | Status: SHIPPED | OUTPATIENT
Start: 2020-07-29 | End: 2021-01-01

## 2020-07-29 NOTE — TELEPHONE ENCOUNTER
Dr Mason authorized Prednisone and Doxycycline for patient. Orders placed to pharmacy. Patient aware of treatment plan and will return call to clinic with status update.

## 2020-07-29 NOTE — TELEPHONE ENCOUNTER
"Patient requesting Prednisone taper for green/yellow drainage that she presumes is sinus drainage.  Does not have facial pain but reports feeling drainage running down back of throat. When she attempts to cough up,  it gets\" stuck\" in throat because so thick. Complains of sore right side of throat and sore right side of tongue at base.   She can not see back that far where tongue is sore.  Denies fever, has slight cough and loses voice at times. Will message provider for advisement. May need COPD flare meds and or PCP apt.      "

## 2020-09-14 ENCOUNTER — HOSPITAL ENCOUNTER (OUTPATIENT)
Dept: CT IMAGING | Facility: CLINIC | Age: 60
Discharge: HOME | End: 2020-09-14
Attending: NURSE PRACTITIONER | Admitting: NURSE PRACTITIONER
Payer: MEDICARE

## 2020-09-14 ENCOUNTER — OFFICE VISIT (OUTPATIENT)
Dept: RADIATION ONCOLOGY | Facility: CLINIC | Age: 60
End: 2020-09-14
Attending: RADIOLOGY
Payer: MEDICARE

## 2020-09-14 DIAGNOSIS — C34.12 MALIGNANT NEOPLASM OF UPPER LOBE OF LEFT LUNG (H): Primary | ICD-10-CM

## 2020-09-14 DIAGNOSIS — C34.12 MALIGNANT NEOPLASM OF UPPER LOBE OF LEFT LUNG (H): ICD-10-CM

## 2020-09-14 PROCEDURE — 71250 CT THORAX DX C-: CPT

## 2020-09-14 NOTE — LETTER
2020         RE: Mary Kay Deal  [address]  [place] [address]        Dear Colleague,    Thank you for referring your patient, Mary Kay Deal, to the RAD[facility]ATION ONCOLOGY CL[facility]IC. Please see a copy of my visit note below.    RADIATION ONCOLOGY FOLLOW-UP  20  NAME: Mary Kay Deal  MRN: [id number]  : 1960    DISEASE TREATED: Presumed NSCLC of the left upper lobe, [id number]    INTERVAL SINCE COMPLETION OF RADIOTHERAPY: ~7 years (Completed 2013)    TYPE OF RADIOTHERAPY GIVEN: SBRT 5000cGy in 5 fractions, 6MV, 80%IDL    SUBJECTIVE:   Mrs. Deal is a 57 year old woman with history of recurrent fungal infection and non-biopsy proven stage IA lung cancer of the RUBEN, s/p SBRT. She returns for routine follow-up.    On exam today, Ms. Deal is overall doing well.  She continues on oxygen between 2 -4L.  She occasionally has to take prednisone for shortness of breath, but estimates it is only every couple of months.  She has fatigue.    She is in a scooter.  They have been very careful because of COVID. She otherwise denies fevers, chills, nausea, vomiting, diarrhea. A complete review of systems was otherwise unremarkable.  She does not feel as though the radiation has affected her lung function and has no side effects from the radiation.  She quit smoking with her lung cancer diagnosis.  OBJECTIVE:  LMP 2004   Gen: No acute distress. Alert, oriented.   Neck: No palpable LNs in the neck or supraclavicular areas.   CV: Regular rate and rhythm. No murmur.   Lungs: No wheezes, but decreased breath sounds throughout.    IMAGING:   CT scan 2020:                                                                   IMPRESSION:  1. Diffuse emphysematous changes with no evidence of recurrent  disease.  2. Unchanged moderate hiatal hernia.     I have personally reviewed the examination and initial interpretation  and I agree with the findings.     SAMSON HERRERA MD         IMPRESSION: Presumed NSCLC of left  upper lobe, cT1a N0M0 with radiographic complete response to SBRT.     RECOMMENDATIONS: Follow up in 1 year with CT chest. Continue close follow up with pulmonology for her lung function and COPD.    Nicole Burton NP  Department of Radiation Oncology  [facility]

## 2020-09-22 ENCOUNTER — VIRTUAL VISIT (OUTPATIENT)
Dept: PULMONOLOGY | Facility: CLINIC | Age: 60
End: 2020-09-22
Attending: INTERNAL MEDICINE
Payer: MEDICARE

## 2020-09-22 DIAGNOSIS — K21.00 GASTROESOPHAGEAL REFLUX DISEASE WITH ESOPHAGITIS: ICD-10-CM

## 2020-09-22 DIAGNOSIS — J43.2 CENTRILOBULAR EMPHYSEMA (H): Primary | ICD-10-CM

## 2020-09-22 RX ORDER — FAMOTIDINE 40 MG/1
40 TABLET, FILM COATED ORAL AT BEDTIME
Qty: 30 TABLET | Refills: 4 | Status: SHIPPED | OUTPATIENT
Start: 2020-09-22 | End: 2021-02-17

## 2020-09-22 NOTE — PROGRESS NOTES
"Reason for Visit  Mary Kay Deal is a 60 year old female who is being seen for No chief complaint on file.    Pulmonary HPI  The patient was seen and examined by Keith Mason MD     Mary Kay Deal is a 60 year old female with severe COPD on home O2. 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  She was empirically treated with SBRT for possible lung cancer (non biopsy proven) in the left UL in 8/2013. She has multiple pulmonary nodules.    She typically has 1 - 2 exacerbations per yr. Last exacerbation was on 10/22/19.    She called us on 7/2020 with increased cough/productive and green. Treated with pred burst and levaquin.    Interval History   Her main complaint today was the sensation of something \"stuck\" in the throat. It improved with anbx.  It has been sore on and off. Sometimes improves with cough and brings up phlegm, this helps. This sx has been going on for many years and it was worse of late. She was seen for this and nothing was noted on exam per pt. These sx wax and wane. She has \"gagging\" sensation due to this sensation.  She is coughing up clear phlegm (small qty). Doing her nebs twice daily. Used rescue inhalers during the exacerbation.   She feels her SOB is better. She is not excercising as much, does some arms and scrunches. No CP now, has had it. No hemoptysis.      Her sinuses are not usually draining but more so when active they drain. Occ drainage at night. She is sleeping varies.  Denies any significant leg swelling.    O2 Requirements/Use:   She uses continuous flow when she is at rest at home and demand flow when she is out of the house. She leaves it at 4 LPM at rest. While she is walking she requires 4 LPM. When she is sleeping she uses 4 LPM supplemental O2.      Current Outpatient Medications   Medication     albuterol (VENTOLIN HFA) 108 (90 Base) MCG/ACT inhaler     amLODIPine (NORVASC) 5 MG tablet     azithromycin (ZITHROMAX) 250 MG tablet     " BROVANA 15 MCG/2ML NEBU neb solution     budesonide (PULMICORT) 0.5 MG/2ML neb solution     budesonide (PULMICORT) 1 MG/2ML neb solution     Calcium (CALCIUM 600) 600 MG tablet     Chlorpheniramine-DM 4-20 MG TABS     Cholecalciferol (VITAMIN D) 1000 UNIT capsule     doxycycline hyclate (VIBRAMYCIN) 100 MG capsule     fluticasone (FLONASE) 50 MCG/ACT nasal spray     LORazepam (ATIVAN) 0.5 MG tablet     Misc. Devices (FINGERTIP PULSE OXIMETER) MISC     montelukast (SINGULAIR) 10 MG tablet     MULTIVITAMIN TABS   OR     omeprazole (PRILOSEC) 20 MG DR capsule     order for DME     ORDER FOR DME     predniSONE (DELTASONE) 10 MG tablet     Respiratory Therapy Supplies (NEBULIZER COMPRESSOR) KIT     SENNA LAXATIVE OR     sertraline (ZOLOFT) 50 MG tablet     simvastatin (ZOCOR) 20 MG tablet     SPIRIVA HANDIHALER 18 MCG inhaled capsule     No current facility-administered medications for this visit.        Allergies   Allergen Reactions     Penicillins Hives       Past Medical History:   Diagnosis Date     Alcohol dependence (H)     completed treatment in      Aspergillosis, with pneumonia (H)      Cancer (H)     questionable lung CA     Chronic infection     Known fungal lung condition     Constipation      Disorder of bone and cartilage, unspecified      Emphysema      GERD      Hx of radiation therapy     For lung mass     Mild intermittent asthma      Other dyspnea and respiratory abnormality      Other malaise and fatigue      SMOKER        Past Surgical History:   Procedure Laterality Date     BREAST SURGERY      Tumor removal at the age of 15     COLONOSCOPY N/A 2014    Procedure: COLONOSCOPY;  Surgeon: Patricia Perry MD;  Location:  OR     COLONOSCOPY N/A 2015    Procedure: COMBINED COLONOSCOPY, SINGLE OR MULTIPLE BIOPSY/POLYPECTOMY BY BIOPSY;  Surgeon: Patricia Perry MD;  Location:  GI     ENT SURGERY      Tonsillectomy     GYN SURGERY       x 1     SURGICAL HISTORY OF -        s/p Breast tumor @ age 15 - benign     SURGICAL HISTORY OF -       c section     SURGICAL HISTORY OF -       tonsillectomy       Social History     Socioeconomic History     Marital status:      Spouse name: Not on file     Number of children: Not on file     Years of education: Not on file     Highest education level: Not on file   Occupational History     Not on file   Social Needs     Financial resource strain: Not on file     Food insecurity     Worry: Not on file     Inability: Not on file     Transportation needs     Medical: Not on file     Non-medical: Not on file   Tobacco Use     Smoking status: Former Smoker     Packs/day: 1.00     Years: 30.00     Pack years: 30.00     Types: Cigarettes     Last attempt to quit: 10/21/2005     Years since quittin.9     Smokeless tobacco: Never Used   Substance and Sexual Activity     Alcohol use: No     Alcohol/week: 0.0 standard drinks     Comment: sober since      Drug use: No     Sexual activity: Yes     Partners: Male     Comment: vas   Lifestyle     Physical activity     Days per week: Not on file     Minutes per session: Not on file     Stress: Not on file   Relationships     Social connections     Talks on phone: Not on file     Gets together: Not on file     Attends Rastafarian service: Not on file     Active member of club or organization: Not on file     Attends meetings of clubs or organizations: Not on file     Relationship status: Not on file     Intimate partner violence     Fear of current or ex partner: Not on file     Emotionally abused: Not on file     Physically abused: Not on file     Forced sexual activity: Not on file   Other Topics Concern     Parent/sibling w/ CABG, MI or angioplasty before 65F 55M? Yes   Social History Narrative    , 3 children    Retired  at [employer]          The patient is currently on disability due to COPD.  She rides a scooter due to her lungs.  She used to work as a .   She and her  are living with their daughter in [place].  They have a [place] Corgi mix.  The patient stopped smoking 9 years ago.  She smoked 1-2 packs of cigarettes per day for 30 years, consistent with 45-60 pack year history.  She denies cigars, pipes or chewing tobacco.  She smoked marijuana in her teens.  The patient is an alcoholic and has been sober for the past 27 years.  Caffeine intake includes 4-5 cups of caffeinated coffee every morning.  She does not drink tea.  She consumes 1 can of Diet Pepsi as late as 7:00 p.m.  She eats chocolate candy bars as late as 7:00 p.m.  She does not exercise.  She has a treadmill, so she could; however, as of this time she is not exercising.                ROS Pulmonary  Constitutional- Negative. Appetite is good. Weight is down by 5 - 10#.  Eyes- Negative.  Ear, nose and throat- Negative.  Cardiac- Negative.  Pulm- See HPI  GI- Positive. Heartburn about 3x/week, during the night time mostly; continues to sleep with HOB elevation.   Genitourinary- Negative.  Musculoskeletal- Muscle cramps with PPI BID hence taking it once daily. Left knee has been bothering him, was assess by PCP.  Neurology- Negative.  Dermatology- Negative.  Endocrine- Negative.  Lymphatic- Negative.  Psychiatry- She is using ativan prn basis and sometimes at night. She is using it twice a week.    A complete ROS was otherwise negative except as noted in the HPI.    Vitals:   Results  No results found for this or any previous visit (from the past 168 hour(s)).      Chest CT (9/2020): No evidence of malignancy. Emphysema +.  6MWT (1/22/2016): Walked 460 ft on 4 lpm NC. Hence recommended to use this during the day with exertion and while sleeping. Can be on RA at rest.     Assessment and Plan: Mary Kay Deal is a 60-year-old female has a longstanding history of severe obstructive lung disease who comes today to the clinic for follow up.    1. RUL Mass /Aspergillus:  This lesion was biopsied by IR and it  showed aspergillus and was treated with one month course of voriconazole. Due to lack of improvement it was stopped as per patient. This has remained stable. This was followed/managed by Dr. Pierre.    2. Emphysema: Very severe lung disease with Chronic hypoxic/hypercapneic resp failure.  - Will cont flu vaccine every year and has received pneumonia vaccine 5 years apart x2 doses. Received PCV 13 12/5/2014.  - Cont spiriva and prn nebs/inhalers. Cont Budesonide/Brovana nebs.  - Cont azithromycin daily.  - She would benefit from doing outpt pulm rehab but she is unable to do it due to lack of transportation. She has a treadmill at home and is currently using it to exercise.  - Cont prn lorazepam.    9/22/2020: Her pulmonary symptoms are stable. She will continue at 4 LPM.     Consideration for Lung Transplant: Given her lung function, she may be candidate for a lung transplant. We discussed her case at conference. Advised to call lung tx referral line at last visit in 4/2020.    Insomnia: Sleep study - no MADELIN noted (5/2014).    3. Pulmonary Nodules:    - Chest CT done on 08/3/12 showed a new left UL nodule that increased in size when followed. This was reviewed at Pulmonary nodule conference and given the risk of doing biopsy and after seeing Dr. Lorenzo she underwent SBRT. SBRT 5000cGy in 5 fractions over 3 months - last dose on 08/29/13.  Last Chest CT was in 9/2018 - no evidence of malignancy.  --  She is followed by Radiation oncology and they will continue to determine appropriate imaging follow up. She will get a Chest CT in one month. We will at the minimum need yearly Chest CT scans.  10/1/2018: CT of chest on 9/17/2018 was stable and revealed no evidence of progressive or recurrent malignancy. As per pt Rad oncology would only do Chest CT annually.  4/14/2020: Last CT in 9/2019 shows no evidence of recurrence of malignancy.  9/22/2020: Chest CT from 9/14/20 showed stable sub - 4mm nodules. Will repeat in  "one year.    4. Allergies:   - NetiPot (nasal rinses) - unable to do them.   - Cont Singulair.  9/22/2020: Still has ongoing rhinorrhoea. She is taking Flonase.    5. Obesity:   - Encouraged patient to eat healthier and gradually increase her exercise for weight loss.    6. GERD:   - Seen by GI for this and constipation. Recommended FODMAP diet trial.   - She had some dysphagia (pills getting stuck) hence had esophagagram on 1/2018 which showed mild dysmotility.    - EGD (11/2019): Medium sized hiatal hernia noted. few gastric polyps. Remain on PPI for life.  3/26/2018: She was instructed to take Prilosec 20 mg BID to better control her GERD.  9/22/2020: Heart burn is not well controlled. She feels on PPI BID has more cramps. She is taking PPI at night. Recommended to take Pepcid 40mg daily.    7. Dysphagia: Etiology unclear. As mentioned has had esophagogram, EGD which were negative. We will consider doing speech pathology consultation. If negative then will consider GI consultation.    8. H/O Presumed Lung CA ([id number]): Continue follow up with Radiation oncology, they will decide about treatment regimen and CT's. This was Left UL lesion.  - TYPE OF RADIOTHERAPY GIVEN: SBRT 5000cGy in 5 fractions, 6MV, 80%IDL  - Currently on annual chest CT regimen - due in Fall of every year.    9. Health Care Maintenance:  4/1/2019:  Consider Shingrix and TDaP vaccinations in the future.    F/U in 4 months with spirometry.    Mary Kay Deal is a 60 year old female who is being evaluated via a billable telephone visit.      The patient has been notified of following:     \"This telephone visit will be conducted via a call between you and your physician/provider. We have found that certain health care needs can be provided without the need for a physical exam.  This service lets us provide the care you need with a short phone conversation.  If a prescription is necessary we can send it directly to your pharmacy.  If lab work is " "needed we can place an order for that and you can then stop by our lab to have the test done at a later time.    Telephone visits are billed at different rates depending on your insurance coverage. During this emergency period, for some insurers they may be billed the same as an in-person visit.  Please reach out to your insurance provider with any questions.    If during the course of the call the physician/provider feels a telephone visit is not appropriate, you will not be charged for this service.\"    Patient has given verbal consent for Telephone visit?  Yes    What phone number would you like to be contacted at? [phone]    How would you like to obtain your AVS? MyChart    Phone call duration: 15 minutes    Keith Mason MD      "

## 2020-09-22 NOTE — LETTER
"    9/22/2020         RE: Mary Kay Deal  [address]   [place] [address]        Dear Colleague,    Thank you for referring your patient, Mary Kay Deal, to the [facility]. Please see a copy of my visit note below.    Reason for Visit  Mary Kay Deal is a 60 year old female who is being seen for No chief complaint on file.    Pulmonary HPI  The patient was seen and examined by Keith Mason MD     Mary Kay Deal is a 60 year old female with severe COPD on home O2. 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  She was empirically treated with SBRT for possible lung cancer (non biopsy proven) in the left UL in 8/2013. She has multiple pulmonary nodules.    She typically has 1 - 2 exacerbations per yr. Last exacerbation was on 10/22/19.    She called us on 7/2020 with increased cough/productive and green. Treated with pred burst and levaquin.    Interval History   Her main complaint today was the sensation of something \"stuck\" in the throat. It improved with anbx.  It has been sore on and off. Sometimes improves with cough and brings up phlegm, this helps. This sx has been going on for many years and it was worse of late. She was seen for this and nothing was noted on exam per pt. These sx wax and wane. She has \"gagging\" sensation due to this sensation.  She is coughing up clear phlegm (small qty). Doing her nebs twice daily. Used rescue inhalers during the exacerbation.   She feels her SOB is better. She is not excercising as much, does some arms and scrunches. No CP now, has had it. No hemoptysis.      Her sinuses are not usually draining but more so when active they drain. Occ drainage at night. She is sleeping varies.  Denies any significant leg swelling.    O2 Requirements/Use:   She uses continuous flow when she is at rest at home and demand flow when she is out of the house. She leaves it at 4 LPM at rest. While she is walking she requires 4 LPM. " When she is sleeping she uses 4 LPM supplemental O2.      Current Outpatient Medications   Medication     albuterol (VENTOLIN HFA) 108 (90 Base) MCG/ACT inhaler     amLODIPine (NORVASC) 5 MG tablet     azithromycin (ZITHROMAX) 250 MG tablet     BROVANA 15 MCG/2ML NEBU neb solution     budesonide (PULMICORT) 0.5 MG/2ML neb solution     budesonide (PULMICORT) 1 MG/2ML neb solution     Calcium (CALCIUM 600) 600 MG tablet     Chlorpheniramine-DM 4-20 MG TABS     Cholecalciferol (VITAMIN D) 1000 UNIT capsule     doxycycline hyclate (VIBRAMYCIN) 100 MG capsule     fluticasone (FLONASE) 50 MCG/ACT nasal spray     LORazepam (ATIVAN) 0.5 MG tablet     Misc. Devices (FINGERTIP PULSE OXIMETER) MISC     montelukast (SINGULAIR) 10 MG tablet     MULTIVITAMIN TABS   OR     omeprazole (PRILOSEC) 20 MG DR capsule     order for DME     ORDER FOR DME     predniSONE (DELTASONE) 10 MG tablet     Respiratory Therapy Supplies (NEBULIZER COMPRESSOR) KIT     SENNA LAXATIVE OR     sertraline (ZOLOFT) 50 MG tablet     simvastatin (ZOCOR) 20 MG tablet     SPIRIVA HANDIHALER 18 MCG inhaled capsule     No current facility-administered medications for this visit.        Allergies   Allergen Reactions     Penicillins Hives       Past Medical History:   Diagnosis Date     Alcohol dependence (H)     completed treatment in 1986     Aspergillosis, with pneumonia (H)      Cancer (H)     questionable lung CA     Chronic infection     Known fungal lung condition     Constipation      Disorder of bone and cartilage, unspecified      Emphysema      GERD      Hx of radiation therapy 2013    For lung mass     Mild intermittent asthma      Other dyspnea and respiratory abnormality      Other malaise and fatigue      SMOKER        Past Surgical History:   Procedure Laterality Date     BREAST SURGERY      Tumor removal at the age of 15     COLONOSCOPY N/A 8/20/2014    Procedure: COLONOSCOPY;  Surgeon: Patricia Perry MD;  Location: [facility] OR     COLONOSCOPY N/A  2015    Procedure: COMBINED COLONOSCOPY, SINGLE OR MULTIPLE BIOPSY/POLYPECTOMY BY BIOPSY;  Surgeon: Patricia Perry MD;  Location:  GI     ENT SURGERY      Tonsillectomy     GYN SURGERY       x 1     SURGICAL HISTORY OF -       s/p Breast tumor @ age 15 - benign     SURGICAL HISTORY OF -       c section     SURGICAL HISTORY OF -       tonsillectomy       Social History     Socioeconomic History     Marital status:      Spouse name: Not on file     Number of children: Not on file     Years of education: Not on file     Highest education level: Not on file   Occupational History     Not on file   Social Needs     Financial resource strain: Not on file     Food insecurity     Worry: Not on file     Inability: Not on file     Transportation needs     Medical: Not on file     Non-medical: Not on file   Tobacco Use     Smoking status: Former Smoker     Packs/day: 1.00     Years: 30.00     Pack years: 30.00     Types: Cigarettes     Last attempt to quit: 10/21/2005     Years since quittin.9     Smokeless tobacco: Never Used   Substance and Sexual Activity     Alcohol use: No     Alcohol/week: 0.0 standard drinks     Comment: sober since      Drug use: No     Sexual activity: Yes     Partners: Male     Comment: vas   Lifestyle     Physical activity     Days per week: Not on file     Minutes per session: Not on file     Stress: Not on file   Relationships     Social connections     Talks on phone: Not on file     Gets together: Not on file     Attends Mandaen service: Not on file     Active member of club or organization: Not on file     Attends meetings of clubs or organizations: Not on file     Relationship status: Not on file     Intimate partner violence     Fear of current or ex partner: Not on file     Emotionally abused: Not on file     Physically abused: Not on file     Forced sexual activity: Not on file   Other Topics Concern     Parent/sibling w/ CABG, MI or angioplasty before  65F 55M? Yes   Social History Narrative    , 3 children    Retired  at [employer]          The patient is currently on disability due to COPD.  She rides a scooter due to her lungs.  She used to work as a .  She and her  are living with their daughter in [place].  They have a [place] Corgi mix.  The patient stopped smoking 9 years ago.  She smoked 1-2 packs of cigarettes per day for 30 years, consistent with 45-60 pack year history.  She denies cigars, pipes or chewing tobacco.  She smoked marijuana in her teens.  The patient is an alcoholic and has been sober for the past 27 years.  Caffeine intake includes 4-5 cups of caffeinated coffee every morning.  She does not drink tea.  She consumes 1 can of Diet Pepsi as late as 7:00 p.m.  She eats chocolate candy bars as late as 7:00 p.m.  She does not exercise.  She has a treadmill, so she could; however, as of this time she is not exercising.                ROS Pulmonary  Constitutional- Negative. Appetite is good. Weight is down by 5 - 10#.  Eyes- Negative.  Ear, nose and throat- Negative.  Cardiac- Negative.  Pulm- See HPI  GI- Positive. Heartburn about 3x/week, during the night time mostly; continues to sleep with HOB elevation.   Genitourinary- Negative.  Musculoskeletal- Muscle cramps with PPI BID hence taking it once daily. Left knee has been bothering him, was assess by PCP.  Neurology- Negative.  Dermatology- Negative.  Endocrine- Negative.  Lymphatic- Negative.  Psychiatry- She is using ativan prn basis and sometimes at night. She is using it twice a week.    A complete ROS was otherwise negative except as noted in the HPI.    Vitals:   Results  No results found for this or any previous visit (from the past 168 hour(s)).      Chest CT (9/2020): No evidence of malignancy. Emphysema +.  6MWT (1/22/2016): Walked 460 ft on 4 lpm NC. Hence recommended to use this during the day with exertion and while sleeping. Can be on RA at  rest.     Assessment and Plan: Mary Kay Deal is a 60-year-old female has a longstanding history of severe obstructive lung disease who comes today to the clinic for follow up.    1. RUL Mass /Aspergillus:  This lesion was biopsied by IR and it showed aspergillus and was treated with one month course of voriconazole. Due to lack of improvement it was stopped as per patient. This has remained stable. This was followed/managed by Dr. Pierre.    2. Emphysema: Very severe lung disease with Chronic hypoxic/hypercapneic resp failure.  - Will cont flu vaccine every year and has received pneumonia vaccine 5 years apart x2 doses. Received PCV 13 12/5/2014.  - Cont spiriva and prn nebs/inhalers. Cont Budesonide/Brovana nebs.  - Cont azithromycin daily.  - She would benefit from doing outpt pulm rehab but she is unable to do it due to lack of transportation. She has a treadmill at home and is currently using it to exercise.  - Cont prn lorazepam.    9/22/2020: Her pulmonary symptoms are stable. She will continue at 4 LPM.     Consideration for Lung Transplant: Given her lung function, she may be candidate for a lung transplant. We discussed her case at conference. Advised to call lung tx referral line at last visit in 4/2020.    Insomnia: Sleep study - no MADELIN noted (5/2014).    3. Pulmonary Nodules:    - Chest CT done on 08/3/12 showed a new left UL nodule that increased in size when followed. This was reviewed at Pulmonary nodule conference and given the risk of doing biopsy and after seeing Dr. Lorenzo she underwent SBRT. SBRT 5000cGy in 5 fractions over 3 months - last dose on 08/29/13.  Last Chest CT was in 9/2018 - no evidence of malignancy.  --  She is followed by Radiation oncology and they will continue to determine appropriate imaging follow up. She will get a Chest CT in one month. We will at the minimum need yearly Chest CT scans.  10/1/2018: CT of chest on 9/17/2018 was stable and revealed no evidence of progressive  or recurrent malignancy. As per pt Rad oncology would only do Chest CT annually.  4/14/2020: Last CT in 9/2019 shows no evidence of recurrence of malignancy.  9/22/2020: Chest CT from 9/14/20 showed stable sub - 4mm nodules. Will repeat in one year.    4. Allergies:   - NetiPot (nasal rinses) - unable to do them.   - Cont Singulair.  9/22/2020: Still has ongoing rhinorrhoea. She is taking Flonase.    5. Obesity:   - Encouraged patient to eat healthier and gradually increase her exercise for weight loss.    6. GERD:   - Seen by GI for this and constipation. Recommended FODMAP diet trial.   - She had some dysphagia (pills getting stuck) hence had esophagagram on 1/2018 which showed mild dysmotility.    - EGD (11/2019): Medium sized hiatal hernia noted. few gastric polyps. Remain on PPI for life.  3/26/2018: She was instructed to take Prilosec 20 mg BID to better control her GERD.  9/22/2020: Heart burn is not well controlled. She feels on PPI BID has more cramps. She is taking PPI at night. Recommended to take Pepcid 40mg daily.    7. Dysphagia: Etiology unclear. As mentioned has had esophagogram, EGD which were negative. We will consider doing speech pathology consultation. If negative then will consider GI consultation.    8. H/O Presumed Lung CA ([id number]): Continue follow up with Radiation oncology, they will decide about treatment regimen and CT's. This was Left UL lesion.  - TYPE OF RADIOTHERAPY GIVEN: SBRT 5000cGy in 5 fractions, 6MV, 80%IDL  - Currently on annual chest CT regimen - due in Fall of every year.    9. Health Care Maintenance:  4/1/2019:  Consider Shingrix and TDaP vaccinations in the future.      F/U in 4 months with spirometry.    Again, thank you for allowing me to participate in the care of your patient.        Sincerely,        Keith Mason MD

## 2020-11-16 ENCOUNTER — HEALTH MAINTENANCE LETTER (OUTPATIENT)
Age: 60
End: 2020-11-16

## 2021-01-01 ENCOUNTER — TRANSFERRED RECORDS (OUTPATIENT)
Dept: HEALTH INFORMATION MANAGEMENT | Facility: CLINIC | Age: 61
End: 2021-01-01
Payer: MEDICARE

## 2021-01-01 ENCOUNTER — OFFICE VISIT (OUTPATIENT)
Dept: PULMONOLOGY | Facility: CLINIC | Age: 61
End: 2021-01-01
Attending: INTERNAL MEDICINE
Payer: MEDICARE

## 2021-01-01 ENCOUNTER — HEALTH MAINTENANCE LETTER (OUTPATIENT)
Age: 61
End: 2021-01-01

## 2021-01-01 ENCOUNTER — VIRTUAL VISIT (OUTPATIENT)
Dept: FAMILY MEDICINE | Facility: CLINIC | Age: 61
End: 2021-01-01
Payer: MEDICARE

## 2021-01-01 ENCOUNTER — TRANSCRIBE ORDERS (OUTPATIENT)
Dept: OTHER | Age: 61
End: 2021-01-01
Payer: MEDICARE

## 2021-01-01 ENCOUNTER — TELEPHONE (OUTPATIENT)
Dept: PULMONOLOGY | Facility: CLINIC | Age: 61
End: 2021-01-01

## 2021-01-01 ENCOUNTER — PRE VISIT (OUTPATIENT)
Dept: ONCOLOGY | Facility: CLINIC | Age: 61
End: 2021-01-01
Payer: MEDICARE

## 2021-01-01 ENCOUNTER — DOCUMENTATION ONLY (OUTPATIENT)
Dept: ONCOLOGY | Facility: CLINIC | Age: 61
End: 2021-01-01
Payer: MEDICARE

## 2021-01-01 ENCOUNTER — OFFICE VISIT (OUTPATIENT)
Dept: RADIATION ONCOLOGY | Facility: CLINIC | Age: 61
End: 2021-01-01
Attending: RADIOLOGY
Payer: MEDICARE

## 2021-01-01 ENCOUNTER — PATIENT OUTREACH (OUTPATIENT)
Dept: ONCOLOGY | Facility: CLINIC | Age: 61
End: 2021-01-01
Payer: MEDICARE

## 2021-01-01 ENCOUNTER — TRANSCRIBE ORDERS (OUTPATIENT)
Dept: ONCOLOGY | Facility: CLINIC | Age: 61
End: 2021-01-01
Payer: MEDICARE

## 2021-01-01 ENCOUNTER — TELEPHONE (OUTPATIENT)
Dept: PULMONOLOGY | Facility: CLINIC | Age: 61
End: 2021-01-01
Payer: MEDICARE

## 2021-01-01 ENCOUNTER — ANCILLARY PROCEDURE (OUTPATIENT)
Dept: CT IMAGING | Facility: CLINIC | Age: 61
End: 2021-01-01
Attending: NURSE PRACTITIONER
Payer: MEDICARE

## 2021-01-01 ENCOUNTER — OFFICE VISIT (OUTPATIENT)
Dept: ONCOLOGY | Facility: CLINIC | Age: 61
End: 2021-01-01
Attending: SURGERY
Payer: MEDICARE

## 2021-01-01 ENCOUNTER — LAB (OUTPATIENT)
Dept: LAB | Facility: CLINIC | Age: 61
End: 2021-01-01
Payer: MEDICARE

## 2021-01-01 ENCOUNTER — ONCOLOGY VISIT (OUTPATIENT)
Dept: ONCOLOGY | Facility: CLINIC | Age: 61
End: 2021-01-01
Attending: INTERNAL MEDICINE
Payer: MEDICARE

## 2021-01-01 VITALS
HEART RATE: 117 BPM | TEMPERATURE: 98 F | DIASTOLIC BLOOD PRESSURE: 69 MMHG | OXYGEN SATURATION: 97 % | WEIGHT: 159.2 LBS | BODY MASS INDEX: 26.49 KG/M2 | SYSTOLIC BLOOD PRESSURE: 127 MMHG

## 2021-01-01 VITALS — SYSTOLIC BLOOD PRESSURE: 123 MMHG | OXYGEN SATURATION: 94 % | DIASTOLIC BLOOD PRESSURE: 85 MMHG | HEART RATE: 121 BPM

## 2021-01-01 DIAGNOSIS — C34.10 MALIGNANT NEOPLASM OF UPPER LOBE OF LUNG, UNSPECIFIED LATERALITY (H): ICD-10-CM

## 2021-01-01 DIAGNOSIS — C50.919 BREAST CANCER (H): ICD-10-CM

## 2021-01-01 DIAGNOSIS — C50.919 BREAST CANCER (H): Primary | ICD-10-CM

## 2021-01-01 DIAGNOSIS — J44.9 COPD (CHRONIC OBSTRUCTIVE PULMONARY DISEASE) (H): Primary | ICD-10-CM

## 2021-01-01 DIAGNOSIS — F32.0 MAJOR DEPRESSIVE DISORDER, SINGLE EPISODE, MILD (H): ICD-10-CM

## 2021-01-01 DIAGNOSIS — J43.2 CENTRILOBULAR EMPHYSEMA (H): Primary | ICD-10-CM

## 2021-01-01 DIAGNOSIS — L30.9 ECZEMA, UNSPECIFIED TYPE: Primary | ICD-10-CM

## 2021-01-01 DIAGNOSIS — J44.1 OBSTRUCTIVE CHRONIC BRONCHITIS WITH EXACERBATION (H): ICD-10-CM

## 2021-01-01 DIAGNOSIS — C34.12 MALIGNANT NEOPLASM OF UPPER LOBE OF LEFT LUNG (H): Primary | ICD-10-CM

## 2021-01-01 DIAGNOSIS — Z17.1 MALIGNANT NEOPLASM OF OVERLAPPING SITES OF LEFT BREAST IN FEMALE, ESTROGEN RECEPTOR NEGATIVE (H): ICD-10-CM

## 2021-01-01 DIAGNOSIS — F32.0 MILD MAJOR DEPRESSION (H): ICD-10-CM

## 2021-01-01 DIAGNOSIS — C34.12 MALIGNANT NEOPLASM OF UPPER LOBE OF LEFT LUNG (H): ICD-10-CM

## 2021-01-01 DIAGNOSIS — C50.812 MALIGNANT NEOPLASM OF OVERLAPPING SITES OF LEFT BREAST IN FEMALE, ESTROGEN RECEPTOR NEGATIVE (H): ICD-10-CM

## 2021-01-01 DIAGNOSIS — I10 ESSENTIAL HYPERTENSION, BENIGN: ICD-10-CM

## 2021-01-01 DIAGNOSIS — J43.2 CENTRILOBULAR EMPHYSEMA (H): ICD-10-CM

## 2021-01-01 DIAGNOSIS — K59.04 CHRONIC IDIOPATHIC CONSTIPATION: ICD-10-CM

## 2021-01-01 DIAGNOSIS — E78.5 HYPERLIPIDEMIA LDL GOAL <130: ICD-10-CM

## 2021-01-01 DIAGNOSIS — J44.9 CHRONIC OBSTRUCTIVE PULMONARY DISEASE, UNSPECIFIED COPD TYPE (H): ICD-10-CM

## 2021-01-01 DIAGNOSIS — F41.9 ANXIETY: ICD-10-CM

## 2021-01-01 DIAGNOSIS — N63.20 LEFT BREAST MASS: Primary | ICD-10-CM

## 2021-01-01 LAB
ATRIAL RATE - MUSE: 111 BPM
DIASTOLIC BLOOD PRESSURE - MUSE: NORMAL MMHG
EXPTIME-PRE: 7.93 SEC
FEF2575-%PRED-PRE: 6 %
FEF2575-PRE: 0.14 L/SEC
FEF2575-PRED: 2.23 L/SEC
FEFMAX-%PRED-PRE: 22 %
FEFMAX-PRE: 1.4 L/SEC
FEFMAX-PRED: 6.34 L/SEC
FEV1-%PRED-PRE: 13 %
FEV1-PRE: 0.34 L
FEV1FEV6-PRE: 34 %
FEV1FEV6-PRED: 80 %
FEV1FVC-PRE: 29 %
FEV1FVC-PRED: 79 %
FIFMAX-PRE: 1.85 L/SEC
FVC-%PRED-PRE: 35 %
FVC-PRE: 1.15 L
FVC-PRED: 3.22 L
INTERPRETATION ECG - MUSE: NORMAL
P AXIS - MUSE: NORMAL DEGREES
PATH REPORT.COMMENTS IMP SPEC: NORMAL
PATH REPORT.FINAL DX SPEC: NORMAL
PATH REPORT.GROSS SPEC: NORMAL
PATH REPORT.MICROSCOPIC SPEC OTHER STN: NORMAL
PATH REPORT.RELEVANT HX SPEC: NORMAL
PATH REPORT.RELEVANT HX SPEC: NORMAL
PATH REPORT.SITE OF ORIGIN SPEC: NORMAL
PR INTERVAL - MUSE: NORMAL MS
QRS DURATION - MUSE: 78 MS
QT - MUSE: 334 MS
QTC - MUSE: 462 MS
R AXIS - MUSE: 67 DEGREES
SYSTOLIC BLOOD PRESSURE - MUSE: NORMAL MMHG
T AXIS - MUSE: 57 DEGREES
VENTRICULAR RATE- MUSE: 115 BPM

## 2021-01-01 PROCEDURE — 99203 OFFICE O/P NEW LOW 30 MIN: CPT | Performed by: SURGERY

## 2021-01-01 PROCEDURE — G0463 HOSPITAL OUTPT CLINIC VISIT: HCPCS

## 2021-01-01 PROCEDURE — G1004 CDSM NDSC: HCPCS | Mod: GC | Performed by: RADIOLOGY

## 2021-01-01 PROCEDURE — 94375 RESPIRATORY FLOW VOLUME LOOP: CPT | Performed by: INTERNAL MEDICINE

## 2021-01-01 PROCEDURE — 71250 CT THORAX DX C-: CPT | Mod: MG | Performed by: RADIOLOGY

## 2021-01-01 PROCEDURE — 88321 CONSLTJ&REPRT SLD PREP ELSWR: CPT | Performed by: PATHOLOGY

## 2021-01-01 PROCEDURE — 99214 OFFICE O/P EST MOD 30 MIN: CPT | Mod: 25 | Performed by: INTERNAL MEDICINE

## 2021-01-01 PROCEDURE — 99205 OFFICE O/P NEW HI 60 MIN: CPT | Performed by: INTERNAL MEDICINE

## 2021-01-01 PROCEDURE — 99443 PR PHYSICIAN TELEPHONE EVALUATION 21-30 MIN: CPT | Mod: 95 | Performed by: INTERNAL MEDICINE

## 2021-01-01 PROCEDURE — 93005 ELECTROCARDIOGRAM TRACING: CPT

## 2021-01-01 RX ORDER — BUDESONIDE 0.5 MG/2ML
INHALANT ORAL
Qty: 360 ML | Refills: 11 | Status: SHIPPED | OUTPATIENT
Start: 2021-01-01 | End: 2021-01-01

## 2021-01-01 RX ORDER — LORAZEPAM 0.5 MG/1
TABLET ORAL
Qty: 90 TABLET | Refills: 0 | Status: SHIPPED | OUTPATIENT
Start: 2021-01-01

## 2021-01-01 RX ORDER — DOCUSATE SODIUM 100 MG/1
100 CAPSULE, LIQUID FILLED ORAL 2 TIMES DAILY
Qty: 60 CAPSULE | Refills: 11 | Status: SHIPPED | OUTPATIENT
Start: 2021-01-01 | End: 2021-01-01

## 2021-01-01 RX ORDER — PREDNISONE 10 MG/1
TABLET ORAL
Qty: 23 TABLET | Refills: 1 | Status: SHIPPED | OUTPATIENT
Start: 2021-01-01

## 2021-01-01 RX ORDER — ARFORMOTEROL TARTRATE 15 UG/2ML
SOLUTION RESPIRATORY (INHALATION)
Qty: 120 ML | Refills: 11 | Status: SHIPPED | OUTPATIENT
Start: 2021-01-01 | End: 2021-01-01

## 2021-01-01 RX ORDER — BUDESONIDE 0.5 MG/2ML
INHALANT ORAL
Qty: 120 ML | Refills: 11 | Status: SHIPPED | OUTPATIENT
Start: 2021-01-01

## 2021-01-01 RX ORDER — ARFORMOTEROL TARTRATE 15 UG/2ML
SOLUTION RESPIRATORY (INHALATION)
Qty: 120 ML | Refills: 9 | Status: SHIPPED | OUTPATIENT
Start: 2021-01-01

## 2021-01-01 RX ORDER — TRIAMCINOLONE ACETONIDE 1 MG/G
CREAM TOPICAL 2 TIMES DAILY
Qty: 45 G | Refills: 6 | Status: SHIPPED | OUTPATIENT
Start: 2021-01-01

## 2021-01-01 RX ORDER — AMLODIPINE BESYLATE 5 MG/1
5 TABLET ORAL DAILY
Qty: 90 TABLET | Refills: 3 | Status: SHIPPED | OUTPATIENT
Start: 2021-01-01

## 2021-01-01 RX ORDER — DOXYCYCLINE HYCLATE 100 MG
100 TABLET ORAL 2 TIMES DAILY
Qty: 20 TABLET | Refills: 0 | Status: SHIPPED | OUTPATIENT
Start: 2021-01-01

## 2021-01-01 RX ORDER — SIMVASTATIN 20 MG
TABLET ORAL
Qty: 90 TABLET | Refills: 3 | Status: SHIPPED | OUTPATIENT
Start: 2021-01-01

## 2021-01-01 RX ORDER — PREDNISONE 10 MG/1
TABLET ORAL
Qty: 30 TABLET | Refills: 0 | Status: SHIPPED | OUTPATIENT
Start: 2021-01-01

## 2021-01-01 ASSESSMENT — PATIENT HEALTH QUESTIONNAIRE - PHQ9: SUM OF ALL RESPONSES TO PHQ QUESTIONS 1-9: 5

## 2021-01-22 DIAGNOSIS — I10 ESSENTIAL HYPERTENSION, BENIGN: ICD-10-CM

## 2021-01-25 RX ORDER — AMLODIPINE BESYLATE 5 MG/1
TABLET ORAL
Qty: 90 TABLET | Refills: 0 | OUTPATIENT
Start: 2021-01-25

## 2021-01-25 NOTE — TELEPHONE ENCOUNTER
Called patient - due for appointment     Pt confirmed she has switched providers/clinics    Xin CHAPA RN

## 2021-02-01 ENCOUNTER — TELEPHONE (OUTPATIENT)
Dept: PULMONOLOGY | Facility: CLINIC | Age: 61
End: 2021-02-01

## 2021-02-01 NOTE — TELEPHONE ENCOUNTER
Left voicemail for patient to contact me (direct number provided) or call center (number provided) to schedule an appointment with Dr. Mason as based on our records, she is due for a follow up visit. Informed her that based on Dr. Mason's notes from her last visit (back in September), this visit can be a telephone visit. Informed her that I would also be sending her a [employer] message detailing the same information.

## 2021-02-02 ENCOUNTER — TELEPHONE (OUTPATIENT)
Dept: PULMONOLOGY | Facility: CLINIC | Age: 61
End: 2021-02-02

## 2021-02-02 NOTE — TELEPHONE ENCOUNTER
Pt returned my call and we were able to schedule her 4 month follow up visit with Dr. Mason via telephone. Appt details confirmed with pt. Also assisted her in logging back into [facility] and discussed to keep her information as up to date there as possible in order to receive the most updated info regarding COVID-19 vaccines.

## 2021-02-17 DIAGNOSIS — K21.00 GASTROESOPHAGEAL REFLUX DISEASE WITH ESOPHAGITIS: ICD-10-CM

## 2021-02-17 RX ORDER — FAMOTIDINE 20 MG/1
TABLET, FILM COATED ORAL
Qty: 60 TABLET | Refills: 4 | Status: SHIPPED | OUTPATIENT
Start: 2021-02-17

## 2021-03-16 ENCOUNTER — VIRTUAL VISIT (OUTPATIENT)
Dept: PULMONOLOGY | Facility: CLINIC | Age: 61
End: 2021-03-16
Attending: INTERNAL MEDICINE
Payer: MEDICARE

## 2021-03-16 DIAGNOSIS — B37.0 ORAL THRUSH: ICD-10-CM

## 2021-03-16 DIAGNOSIS — J43.2 CENTRILOBULAR EMPHYSEMA (H): ICD-10-CM

## 2021-03-16 DIAGNOSIS — K21.00 GASTROESOPHAGEAL REFLUX DISEASE WITH ESOPHAGITIS: ICD-10-CM

## 2021-03-16 PROCEDURE — 99442 PR PHYSICIAN TELEPHONE EVALUATION 11-20 MIN: CPT | Mod: 95 | Performed by: INTERNAL MEDICINE

## 2021-03-16 RX ORDER — AZITHROMYCIN 250 MG/1
250 TABLET, FILM COATED ORAL DAILY
Qty: 30 TABLET | Refills: 11 | Status: SHIPPED | OUTPATIENT
Start: 2021-03-16

## 2021-03-16 RX ORDER — MONTELUKAST SODIUM 10 MG/1
1 TABLET ORAL EVERY MORNING
Qty: 30 TABLET | Refills: 11 | Status: SHIPPED | OUTPATIENT
Start: 2021-03-16

## 2021-03-16 RX ORDER — FLUTICASONE PROPIONATE 50 MCG
1 SPRAY, SUSPENSION (ML) NASAL 2 TIMES DAILY
Qty: 16 G | Refills: 11 | Status: SHIPPED | OUTPATIENT
Start: 2021-03-16

## 2021-03-16 RX ORDER — ARFORMOTEROL TARTRATE 15 UG/2ML
SOLUTION RESPIRATORY (INHALATION)
Qty: 120 ML | Refills: 11 | Status: SHIPPED | OUTPATIENT
Start: 2021-03-16 | End: 2021-01-01

## 2021-03-16 RX ORDER — ALBUTEROL SULFATE 90 UG/1
2 AEROSOL, METERED RESPIRATORY (INHALATION) EVERY 4 HOURS PRN
Qty: 18 G | Refills: 11 | Status: SHIPPED | OUTPATIENT
Start: 2021-03-16

## 2021-03-16 RX ORDER — BUDESONIDE 0.5 MG/2ML
INHALANT ORAL
Qty: 60 AMPULE | Refills: 11 | Status: SHIPPED | OUTPATIENT
Start: 2021-03-16 | End: 2021-01-01

## 2021-03-16 RX ORDER — TIOTROPIUM BROMIDE 18 UG/1
CAPSULE ORAL; RESPIRATORY (INHALATION)
Qty: 90 CAPSULE | Refills: 3 | Status: SHIPPED | OUTPATIENT
Start: 2021-03-16

## 2021-03-16 NOTE — LETTER
"    3/16/2021         RE: Mary Kay Deal  [address]  [place] [address]        Dear Colleague,    Thank you for referring your patient, Mary Kay Deal, to the [facility] [place]. Please see a copy of my visit note below.    Mary Kay is a 60 year old who is being evaluated via a billable telephone visit.      What phone number would you like to be contacted at? [phone]    How would you like to obtain your AVS? MyChart  Phone call duration: 15 minutes      Reason for Visit  Mary Kay Deal is a 60 year old lady who is being seen for Telephone (4mo f/u)    Pulmonary HPI  The patient was seen and examined by Ketih Mason MD     Mary Kay Deal is a 60 year old lady with severe COPD on home O2. 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  She was empirically treated with SBRT for possible lung cancer (non biopsy proven) in the left UL in 8/2013. She has multiple pulmonary nodules.    She typically has 1 - 2 exacerbations per yr. Last exacerbation was on 10/22/19.    She called us on 7/2020 with increased cough/productive and green. Treated with pred burst and levaquin.      Interval History   She took pred burst two weeks ago. She has taken about three course of prednisone since 9/2020. She typically takes it for SOB and difficulty coughing up secretions.     She has some cough and has difficulty bringing up phlegms. She occ brings up whitish yellow phlegm.  She is limited in her ability to do much. She feels SOB with walking from room to room. Doing her nebs twice daily. Used rescue inhalers during the exacerbation has not required it in the last couple of days. Has left upper CP on and off it, it might be related to activity. She feels this occurs with eating \"Chocolate\".    She still has sx of food getting stuck in throat. EAting well. Weight had gone down but gaining back again.     She feels her SOB is better. She is not " excercising as much, does some arms and scrunches. No hemoptysis.      Her sinuses are not usually draining but more so when active they drain. Occ drainage at night. She is sleeping varies.  Denies any significant leg swelling.    O2 Requirements/Use:   She uses continuous flow when she is at rest at home and demand flow when she is out of the house. She leaves it at 4 LPM at rest. While she is walking she requires 4 LPM. When she is sleeping she uses 4 LPM supplemental O2.      Current Outpatient Medications   Medication     albuterol (VENTOLIN HFA) 108 (90 Base) MCG/ACT inhaler     amLODIPine (NORVASC) 5 MG tablet     azithromycin (ZITHROMAX) 250 MG tablet     BROVANA 15 MCG/2ML NEBU neb solution     budesonide (PULMICORT) 0.5 MG/2ML neb solution     Calcium (CALCIUM 600) 600 MG tablet     Chlorpheniramine-DM 4-20 MG TABS     Cholecalciferol (VITAMIN D) 1000 UNIT capsule     famotidine (PEPCID) 20 MG tablet     fluticasone (FLONASE) 50 MCG/ACT nasal spray     LORazepam (ATIVAN) 0.5 MG tablet     Misc. Devices (FINGERTIP PULSE OXIMETER) MISC     montelukast (SINGULAIR) 10 MG tablet     MULTIVITAMIN TABS   OR     omeprazole (PRILOSEC) 20 MG DR capsule     order for DME     ORDER FOR DME     predniSONE (DELTASONE) 10 MG tablet     Respiratory Therapy Supplies (NEBULIZER COMPRESSOR) KIT     SENNA LAXATIVE OR     sertraline (ZOLOFT) 50 MG tablet     simvastatin (ZOCOR) 20 MG tablet     SPIRIVA HANDIHALER 18 MCG inhaled capsule     No current facility-administered medications for this visit.        Allergies   Allergen Reactions     Penicillins Hives       Past Medical History:   Diagnosis Date     Alcohol dependence (H)     completed treatment in 1986     Aspergillosis, with pneumonia (H)      Cancer (H)     questionable lung CA     Chronic infection     Known fungal lung condition     Constipation      Disorder of bone and cartilage, unspecified      Emphysema      GERD      Hx of radiation therapy 2013    For lung  mass     Mild intermittent asthma      Other dyspnea and respiratory abnormality      Other malaise and fatigue      SMOKER        Past Surgical History:   Procedure Laterality Date     BREAST SURGERY      Tumor removal at the age of 15     COLONOSCOPY N/A 2014    Procedure: COLONOSCOPY;  Surgeon: Patricia Perry MD;  Location:  OR     COLONOSCOPY N/A 2015    Procedure: COMBINED COLONOSCOPY, SINGLE OR MULTIPLE BIOPSY/POLYPECTOMY BY BIOPSY;  Surgeon: Patricia Perry MD;  Location:  GI     ENT SURGERY      Tonsillectomy     GYN SURGERY       x 1     SURGICAL HISTORY OF -       s/p Breast tumor @ age 15 - benign     SURGICAL HISTORY OF -       c section     SURGICAL HISTORY OF -       tonsillectomy       Social History     Socioeconomic History     Marital status:      Spouse name: Not on file     Number of children: Not on file     Years of education: Not on file     Highest education level: Not on file   Occupational History     Not on file   Social Needs     Financial resource strain: Not on file     Food insecurity     Worry: Not on file     Inability: Not on file     Transportation needs     Medical: Not on file     Non-medical: Not on file   Tobacco Use     Smoking status: Former Smoker     Packs/day: 1.00     Years: 30.00     Pack years: 30.00     Types: Cigarettes     Quit date: 10/21/2005     Years since quitting: 15.4     Smokeless tobacco: Never Used   Substance and Sexual Activity     Alcohol use: No     Alcohol/week: 0.0 standard drinks     Comment: sober since      Drug use: No     Sexual activity: Yes     Partners: Male     Comment: vas   Lifestyle     Physical activity     Days per week: Not on file     Minutes per session: Not on file     Stress: Not on file   Relationships     Social connections     Talks on phone: Not on file     Gets together: Not on file     Attends Denominational service: Not on file     Active member of club or organization: Not on file      Attends meetings of clubs or organizations: Not on file     Relationship status: Not on file     Intimate partner violence     Fear of current or ex partner: Not on file     Emotionally abused: Not on file     Physically abused: Not on file     Forced sexual activity: Not on file   Other Topics Concern     Parent/sibling w/ CABG, MI or angioplasty before 65F 55M? Yes   Social History Narrative    , 3 children    Retired  at [employer]          The patient is currently on disability due to COPD.  She rides a scooter due to her lungs.  She used to work as a .  She and her  are living with their daughter in [place].  They have a Ahead mix.  The patient stopped smoking 9 years ago.  She smoked 1-2 packs of cigarettes per day for 30 years, consistent with 45-60 pack year history.  She denies cigars, pipes or chewing tobacco.  She smoked marijuana in her teens.  The patient is an alcoholic and has been sober for the past 27 years.  Caffeine intake includes 4-5 cups of caffeinated coffee every morning.  She does not drink tea.  She consumes 1 can of Diet Pepsi as late as 7:00 p.m.  She eats chocolate candy bars as late as 7:00 p.m.  She does not exercise.  She has a treadmill, so she could; however, as of this time she is not exercising.                ROS Pulmonary  Constitutional- Negative. Appetite is good.  Eyes- Negative.  Ear, nose and throat- Negative.  Cardiac- Negative.  Pulm- See HPI  GI- Positive. Heartburn about 3x/week, during the night time mostly; continues to sleep with HOB elevation.   Genitourinary- Negative.  Musculoskeletal- Muscle cramps with PPI BID hence taking it once daily. Left knee has been bothering him, was assess by PCP. Still has neck pain.  Neurology- Negative.  Dermatology- Negative.  Endocrine- Negative.  Lymphatic- Negative.  Psychiatry- She is using ativan prn basis and sometimes at night. She is typically using it twice a week. When having  exacerbation takes it more often.    A complete ROS was otherwise negative except as noted in the HPI.    Vitals:   Results  No results found for this or any previous visit (from the past 168 hour(s)).      Chest CT (9/2020): No evidence of malignancy. Emphysema +.  6MWT (1/22/2016): Walked 460 ft on 4 lpm NC. Hence recommended to use this during the day with exertion and while sleeping. Can be on RA at rest.     Assessment and Plan: Mary Kay Deal is a 60-year-old lady has a longstanding history of severe obstructive lung disease who comes today to the clinic for follow up.    1. RUL Mass /Aspergillus:  This lesion was biopsied by IR and it showed aspergillus and was treated with one month course of voriconazole. Due to lack of improvement it was stopped as per patient. This has remained stable. This was followed/managed by Dr. Pierre.    2. Emphysema: Very severe lung disease with Chronic hypoxic/hypercapneic resp failure.  - Will cont flu vaccine every year and has received pneumonia vaccine 5 years apart x2 doses. Received PCV 13 12/5/2014.  - Cont spiriva and prn nebs/inhalers. Cont Budesonide/Brovana nebs.  - Cont azithromycin daily.  - She would benefit from doing outpt pulm rehab but she is unable to do it due to lack of transportation. She has a treadmill at home and is currently using it to exercise.  - Cont prn lorazepam.    3/16/2021: Her pulmonary symptoms are stable. She will continue at 4 LPM.  She has not been exercising as much.    Consideration for Lung Transplant: Given her lung function, she may be candidate for a lung transplant. We discussed her case at conference. Advised to call lung tx referral line at last visit in 4/2020.    Insomnia: Sleep study - no MADELIN noted (5/2014).    3. Pulmonary Nodules:    - Chest CT done on 08/3/12 showed a new left UL nodule that increased in size when followed. This was reviewed at Pulmonary nodule conference and given the risk of doing biopsy and after seeing  Dr. Lorenzo she underwent SBRT. SBRT 5000cGy in 5 fractions over 3 months - last dose on 08/29/13.  Last Chest CT was in 9/2018 - no evidence of malignancy.  --  She is followed by Radiation oncology and they will continue to determine appropriate imaging follow up. She will get a Chest CT in one month. We will at the minimum need yearly Chest CT scans.  10/1/2018: CT of chest on 9/17/2018 was stable and revealed no evidence of progressive or recurrent malignancy. As per pt Rad oncology would only do Chest CT annually.  4/14/2020: Last CT in 9/2019 shows no evidence of recurrence of malignancy.  9/22/2020: Chest CT from 9/14/20 showed stable sub - 4mm nodules. Will repeat in one year.    4. Allergies:   - NetiPot (nasal rinses) - unable to do them.   - Cont Singulair.  9/22/2020: Still has ongoing rhinorrhoea. She is taking Flonase.    5. Obesity:   - Encouraged patient to eat healthier and gradually increase her exercise for weight loss.    6. GERD:   - Seen by GI for this and constipation. Recommended FODMAP diet trial.   - She had some dysphagia (pills getting stuck) hence had esophagagram on 1/2018 which showed mild dysmotility.    - EGD (11/2019): Medium sized hiatal hernia noted. few gastric polyps. Remain on PPI for life.  3/26/2018: She was instructed to take Prilosec 20 mg BID to better control her GERD.  3/16/2021: Heart burn is not well controlled. She feels on PPI BID has more cramps. She is taking PPI at night. Recommended to take Pepcid 40mg daily.    7. Dysphagia: Etiology unclear. As mentioned has had esophagogram, EGD which were negative. We will consider doing speech pathology consultation. If negative then will consider GI consultation.    8. H/O Presumed Lung CA ([id number]): Continue follow up with Radiation oncology, they will decide about treatment regimen and CT's. This was Left UL lesion.  - TYPE OF RADIOTHERAPY GIVEN: SBRT 5000cGy in 5 fractions, 6MV, 80%IDL  - Currently on annual chest CT  regimen - due in Fall of every year.    9. Health Care Maintenance:  4/1/2019:  Consider Shingrix and TDaP vaccinations in the future.  3/16/2021: She is getting her COVID vaccination.    F/U in 4 months with spirometry.        Again, thank you for allowing me to participate in the care of your patient.        Sincerely,        Keith Mason MD

## 2021-03-16 NOTE — PROGRESS NOTES
"Mary Kay is a 60 year old who is being evaluated via a billable telephone visit.      What phone number would you like to be contacted at? [phone]    How would you like to obtain your AVS? Jermaine  Phone call duration: 15 minutes      Reason for Visit  Mary Kay Deal is a 60 year old lady who is being seen for Telephone (4mo f/u)    Pulmonary HPI  The patient was seen and examined by Keith Mason MD     Mary Kay Deal is a 60 year old lady with severe COPD on home O2. 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  She was empirically treated with SBRT for possible lung cancer (non biopsy proven) in the left UL in 8/2013. She has multiple pulmonary nodules.    She typically has 1 - 2 exacerbations per yr. Last exacerbation was on 10/22/19.    She called us on 7/2020 with increased cough/productive and green. Treated with pred burst and levaquin.      Interval History   She took pred burst two weeks ago. She has taken about three course of prednisone since 9/2020. She typically takes it for SOB and difficulty coughing up secretions.     She has some cough and has difficulty bringing up phlegms. She occ brings up whitish yellow phlegm.  She is limited in her ability to do much. She feels SOB with walking from room to room. Doing her nebs twice daily. Used rescue inhalers during the exacerbation has not required it in the last couple of days. Has left upper CP on and off it, it might be related to activity. She feels this occurs with eating \"Chocolate\".    She still has sx of food getting stuck in throat. EAting well. Weight had gone down but gaining back again.     She feels her SOB is better. She is not excercising as much, does some arms and scrunches. No hemoptysis.      Her sinuses are not usually draining but more so when active they drain. Occ drainage at night. She is sleeping varies.  Denies any significant leg swelling.    O2 Requirements/Use:   She uses continuous flow when " she is at rest at home and demand flow when she is out of the house. She leaves it at 4 LPM at rest. While she is walking she requires 4 LPM. When she is sleeping she uses 4 LPM supplemental O2.      Current Outpatient Medications   Medication     albuterol (VENTOLIN HFA) 108 (90 Base) MCG/ACT inhaler     amLODIPine (NORVASC) 5 MG tablet     azithromycin (ZITHROMAX) 250 MG tablet     BROVANA 15 MCG/2ML NEBU neb solution     budesonide (PULMICORT) 0.5 MG/2ML neb solution     Calcium (CALCIUM 600) 600 MG tablet     Chlorpheniramine-DM 4-20 MG TABS     Cholecalciferol (VITAMIN D) 1000 UNIT capsule     famotidine (PEPCID) 20 MG tablet     fluticasone (FLONASE) 50 MCG/ACT nasal spray     LORazepam (ATIVAN) 0.5 MG tablet     Misc. Devices (FINGERTIP PULSE OXIMETER) MISC     montelukast (SINGULAIR) 10 MG tablet     MULTIVITAMIN TABS   OR     omeprazole (PRILOSEC) 20 MG DR capsule     order for DME     ORDER FOR DME     predniSONE (DELTASONE) 10 MG tablet     Respiratory Therapy Supplies (NEBULIZER COMPRESSOR) KIT     SENNA LAXATIVE OR     sertraline (ZOLOFT) 50 MG tablet     simvastatin (ZOCOR) 20 MG tablet     SPIRIVA HANDIHALER 18 MCG inhaled capsule     No current facility-administered medications for this visit.        Allergies   Allergen Reactions     Penicillins Hives       Past Medical History:   Diagnosis Date     Alcohol dependence (H)     completed treatment in 1986     Aspergillosis, with pneumonia (H)      Cancer (H)     questionable lung CA     Chronic infection     Known fungal lung condition     Constipation      Disorder of bone and cartilage, unspecified      Emphysema      GERD      Hx of radiation therapy 2013    For lung mass     Mild intermittent asthma      Other dyspnea and respiratory abnormality      Other malaise and fatigue      SMOKER        Past Surgical History:   Procedure Laterality Date     BREAST SURGERY      Tumor removal at the age of 15     COLONOSCOPY N/A 8/20/2014    Procedure:  COLONOSCOPY;  Surgeon: Patricia Perry MD;  Location:  OR     COLONOSCOPY N/A 2015    Procedure: COMBINED COLONOSCOPY, SINGLE OR MULTIPLE BIOPSY/POLYPECTOMY BY BIOPSY;  Surgeon: Patricia Perry MD;  Location:  GI     ENT SURGERY      Tonsillectomy     GYN SURGERY       x 1     SURGICAL HISTORY OF -       s/p Breast tumor @ age 15 - benign     SURGICAL HISTORY OF -       c section     SURGICAL HISTORY OF -       tonsillectomy       Social History     Socioeconomic History     Marital status:      Spouse name: Not on file     Number of children: Not on file     Years of education: Not on file     Highest education level: Not on file   Occupational History     Not on file   Social Needs     Financial resource strain: Not on file     Food insecurity     Worry: Not on file     Inability: Not on file     Transportation needs     Medical: Not on file     Non-medical: Not on file   Tobacco Use     Smoking status: Former Smoker     Packs/day: 1.00     Years: 30.00     Pack years: 30.00     Types: Cigarettes     Quit date: 10/21/2005     Years since quitting: 15.4     Smokeless tobacco: Never Used   Substance and Sexual Activity     Alcohol use: No     Alcohol/week: 0.0 standard drinks     Comment: sober since      Drug use: No     Sexual activity: Yes     Partners: Male     Comment: vas   Lifestyle     Physical activity     Days per week: Not on file     Minutes per session: Not on file     Stress: Not on file   Relationships     Social connections     Talks on phone: Not on file     Gets together: Not on file     Attends Yarsanism service: Not on file     Active member of club or organization: Not on file     Attends meetings of clubs or organizations: Not on file     Relationship status: Not on file     Intimate partner violence     Fear of current or ex partner: Not on file     Emotionally abused: Not on file     Physically abused: Not on file     Forced sexual activity: Not on file    Other Topics Concern     Parent/sibling w/ CABG, MI or angioplasty before 65F 55M? Yes   Social History Narrative    , 3 children    Retired  at [employer]          The patient is currently on disability due to COPD.  She rides a scooter due to her lungs.  She used to work as a .  She and her  are living with their daughter in [place].  They have a [place] mix.  The patient stopped smoking 9 years ago.  She smoked 1-2 packs of cigarettes per day for 30 years, consistent with 45-60 pack year history.  She denies cigars, pipes or chewing tobacco.  She smoked marijuana in her teens.  The patient is an alcoholic and has been sober for the past 27 years.  Caffeine intake includes 4-5 cups of caffeinated coffee every morning.  She does not drink tea.  She consumes 1 can of Diet Pepsi as late as 7:00 p.m.  She eats chocolate candy bars as late as 7:00 p.m.  She does not exercise.  She has a treadmill, so she could; however, as of this time she is not exercising.                ROS Pulmonary  Constitutional- Negative. Appetite is good.  Eyes- Negative.  Ear, nose and throat- Negative.  Cardiac- Negative.  Pulm- See HPI  GI- Positive. Heartburn about 3x/week, during the night time mostly; continues to sleep with HOB elevation.   Genitourinary- Negative.  Musculoskeletal- Muscle cramps with PPI BID hence taking it once daily. Left knee has been bothering him, was assess by PCP. Still has neck pain.  Neurology- Negative.  Dermatology- Negative.  Endocrine- Negative.  Lymphatic- Negative.  Psychiatry- She is using ativan prn basis and sometimes at night. She is typically using it twice a week. When having exacerbation takes it more often.    A complete ROS was otherwise negative except as noted in the HPI.    Vitals:   Results  No results found for this or any previous visit (from the past 168 hour(s)).      Chest CT (9/2020): No evidence of malignancy. Emphysema +.  6MWT  (1/22/2016): Walked 460 ft on 4 lpm NC. Hence recommended to use this during the day with exertion and while sleeping. Can be on RA at rest.     Assessment and Plan: Mary Kay Deal is a 60-year-old lady has a longstanding history of severe obstructive lung disease who comes today to the clinic for follow up.    1. RUL Mass /Aspergillus:  This lesion was biopsied by IR and it showed aspergillus and was treated with one month course of voriconazole. Due to lack of improvement it was stopped as per patient. This has remained stable. This was followed/managed by Dr. Pierre.    2. Emphysema: Very severe lung disease with Chronic hypoxic/hypercapneic resp failure.  - Will cont flu vaccine every year and has received pneumonia vaccine 5 years apart x2 doses. Received PCV 13 12/5/2014.  - Cont spiriva and prn nebs/inhalers. Cont Budesonide/Brovana nebs.  - Cont azithromycin daily.  - She would benefit from doing outpt pulm rehab but she is unable to do it due to lack of transportation. She has a treadmill at home and is currently using it to exercise.  - Cont prn lorazepam.    3/16/2021: Her pulmonary symptoms are stable. She will continue at 4 LPM.  She has not been exercising as much.    Consideration for Lung Transplant: Given her lung function, she may be candidate for a lung transplant. We discussed her case at conference. Advised to call lung tx referral line at last visit in 4/2020.    Insomnia: Sleep study - no MADELIN noted (5/2014).    3. Pulmonary Nodules:    - Chest CT done on 08/3/12 showed a new left UL nodule that increased in size when followed. This was reviewed at Pulmonary nodule conference and given the risk of doing biopsy and after seeing Dr. Lorenzo she underwent SBRT. SBRT 5000cGy in 5 fractions over 3 months - last dose on 08/29/13.  Last Chest CT was in 9/2018 - no evidence of malignancy.  --  She is followed by Radiation oncology and they will continue to determine appropriate imaging follow up. She  will get a Chest CT in one month. We will at the minimum need yearly Chest CT scans.  10/1/2018: CT of chest on 9/17/2018 was stable and revealed no evidence of progressive or recurrent malignancy. As per pt Rad oncology would only do Chest CT annually.  4/14/2020: Last CT in 9/2019 shows no evidence of recurrence of malignancy.  9/22/2020: Chest CT from 9/14/20 showed stable sub - 4mm nodules. Will repeat in one year.    4. Allergies:   - NetiPot (nasal rinses) - unable to do them.   - Cont Singulair.  9/22/2020: Still has ongoing rhinorrhoea. She is taking Flonase.    5. Obesity:   - Encouraged patient to eat healthier and gradually increase her exercise for weight loss.    6. GERD:   - Seen by GI for this and constipation. Recommended FODMAP diet trial.   - She had some dysphagia (pills getting stuck) hence had esophagagram on 1/2018 which showed mild dysmotility.    - EGD (11/2019): Medium sized hiatal hernia noted. few gastric polyps. Remain on PPI for life.  3/26/2018: She was instructed to take Prilosec 20 mg BID to better control her GERD.  3/16/2021: Heart burn is not well controlled. She feels on PPI BID has more cramps. She is taking PPI at night. Recommended to take Pepcid 40mg daily.    7. Dysphagia: Etiology unclear. As mentioned has had esophagogram, EGD which were negative. We will consider doing speech pathology consultation. If negative then will consider GI consultation.    8. H/O Presumed Lung CA ([id number]): Continue follow up with Radiation oncology, they will decide about treatment regimen and CT's. This was Left UL lesion.  - TYPE OF RADIOTHERAPY GIVEN: SBRT 5000cGy in 5 fractions, 6MV, 80%IDL  - Currently on annual chest CT regimen - due in Fall of every year.    9. Health Care Maintenance:  4/1/2019:  Consider Shingrix and TDaP vaccinations in the future.  3/16/2021: She is getting her COVID vaccination.    F/U in 4 months with spirometry.

## 2021-04-03 ENCOUNTER — HEALTH MAINTENANCE LETTER (OUTPATIENT)
Age: 61
End: 2021-04-03

## 2021-04-09 NOTE — TELEPHONE ENCOUNTER
Routing refill request to provider for review/approval because:  Drug not on the [facility] refill protocol     Please review and authorize if appropriate.    Thank you,   Balwinder FABIAN RN

## 2021-04-11 NOTE — TELEPHONE ENCOUNTER
Can we ask if she is able to schedule a follow up appointment?    OK for virtual visit Friday if able

## 2021-04-12 NOTE — TELEPHONE ENCOUNTER
Routing refill request to provider for review/approval because:  Drug not on the [facility] refill protocol ; scheduled for office visit soon.  Luisa Monroy RN

## 2021-04-16 NOTE — PROGRESS NOTES
Mary Kay is a 60 year old who is being evaluated via a billable telephone visit.      What phone number would you like to be contacted at? [phone]  How would you like to obtain your AVS? Jermaine        Josh Muñiz is a 60 year old who presents for the following health issues     HPI     Chief Complaint   Patient presents with     Medication Follow-up       COPD   Mary Kay Deal just completed her COVID vaccinations and plans to follow up in pulmonology in the next few months.  She has not had any recent changes in her inhalers and has taken prednisone on rare occasion for shortness of breath.  Eczema, unspecified type   She would like to try an alternative medication to manage her eczema.   Hyperlipidemia LDL goal <130   Has continued on simvastatin and no side effects from this medication.   Essential hypertension, benign   Blood pressure has been well controlled with current dose of amlodipine.   Major depressive disorder, single episode, mild (H)   Mary Kay Deal has been taking sertraline to help with her mental health.  She is taking lorazepam as needed for anxiety related to shortness of breath.  She notes that lorazepam does calm her down and she is quite vigiant about her addiction risk.       Review of Systems   Constitutional, HEENT, cardiovascular, pulmonary, GI, , musculoskeletal, neuro, skin, endocrine and psych systems are negative, except as otherwise noted.      Objective           Vitals:  No vitals were obtained today due to virtual visit.    Physical Exam   healthy, alert and no distress  PSYCH: Alert and oriented times 3; coherent speech, normal   rate and volume, able to articulate logical thoughts, able   to abstract reason, no tangential thoughts, no hallucinations   or delusions  Her affect is normal  RESP: Occasional cough, no audible wheezing, able to talk in full sentences  Remainder of exam unable to be completed due to telephone visits    (L30.9) Eczema, unspecified type  (primary  encounter diagnosis)  Comment: I did offer to refill her prescription for triamcinolone to treat her eczema.  She will let me know if there is no improvement.  Plan: triamcinolone (KENALOG) 0.1 % external cream            (C34.10) Malignant neoplasm of upper lobe of lung, unspecified laterality (H)  Comment: Discussed need for follow-up in pulmonology.  She plans to schedule the next few weeks  Plan:     (J44.1) Obstructive chronic bronchitis with exacerbation (H)  Comment: As above, also continue current inhalers  Plan:     (F32.0) Mild major depression (H)  Comment: We will continue her current dose of sertraline at 50 mg.  Also discussed as needed use of lorazepam which we agreed to continue for anxiety related to shortness of breath.  She is aware of addictive potential as well as the respiratory depressant properties of this medication class  Plan:     (E78.5) Hyperlipidemia LDL goal <130  Comment: Continue simvastatin at current dose  Plan: simvastatin (ZOCOR) 20 MG tablet            (I10) Essential hypertension, benign  Comment: Continue amlodipine  Plan: amLODIPine (NORVASC) 5 MG tablet            (F32.0) Major depressive disorder, single episode, mild (H)  Comment:   Plan: sertraline (ZOLOFT) 50 MG tablet                     Phone call duration: 24 minutes

## 2021-08-30 NOTE — NURSING NOTE
Chief Complaint   Patient presents with     General Visit     follow up     Vitals were taken and medications were reconciled.     ROSALIND Lyles

## 2021-08-30 NOTE — LETTER
8/30/2021         RE: Mary Kay Deal  [address]   [place] [address]        Dear Colleague,    Thank you for referring your patient, Mary Kay Deal, to the [facility] [place]. Please see a copy of my visit note below.    Reason for Visit  Mary Kay Deal is a 61 year old lady who is being seen for General Visit (follow up)    Pulmonary HPI  The patient was seen and examined by Keith Mason MD     Mary Kay Deal is a 61 year old lady with severe COPD on home O2. 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  She was empirically treated with SBRT for possible lung cancer (non biopsy proven) in the left UL in 8/2013. She has multiple pulmonary nodules.    She typically has 1 - 2 exacerbations per yr. Last exacerbation was on 10/22/19.    She called us on 7/2020 with increased cough/productive and green. Treated with pred burst and levaquin.    She has taken about three course of prednisone since 9/2020.  Last Pred burst in 2/2021.    Interval History   She took anbx for sinusitis but no pulm exacerbation since the last visit. She typically takes it for SOB and difficulty coughing up secretions.     She has some cough and has difficulty bringing up phlegms. She occ brings up whitish yellow phlegm.  She is limited in her ability to do much. She feels SOB with walking from room to room. Doing her nebs twice daily. Used rescue inhalers during the exacerbation has not required it recently. Has left lower CP recently and it is improving, it is associated with activity.     She still has sx of food getting stuck in throat (RT side) on and off. EAting well.       Her sinuses are not usually draining but more so when active they drain. Occ drainage at night. She is sleeping varies.  Denies any significant leg swelling.    O2 Requirements/Use:   She uses continuous flow when she is at rest at home and demand flow when she is out of the  house. She leaves it at 4 LPM at rest. While she is walking she requires 4 LPM. When she is sleeping she uses 4 LPM supplemental O2.      Current Outpatient Medications   Medication     albuterol (VENTOLIN HFA) 108 (90 Base) MCG/ACT inhaler     amLODIPine (NORVASC) 5 MG tablet     arformoterol (BROVANA) 15 MCG/2ML NEBU neb solution     azithromycin (ZITHROMAX) 250 MG tablet     budesonide (PULMICORT) 0.5 MG/2ML neb solution     Calcium (CALCIUM 600) 600 MG tablet     Chlorpheniramine-DM 4-20 MG TABS     Cholecalciferol (VITAMIN D) 1000 UNIT capsule     famotidine (PEPCID) 20 MG tablet     fluticasone (FLONASE) 50 MCG/ACT nasal spray     LORazepam (ATIVAN) 0.5 MG tablet     Misc. Devices (FINGERTIP PULSE OXIMETER) MISC     montelukast (SINGULAIR) 10 MG tablet     MULTIVITAMIN TABS   OR     omeprazole (PRILOSEC) 20 MG DR capsule     order for DME     ORDER FOR DME     predniSONE (DELTASONE) 10 MG tablet     Respiratory Therapy Supplies (NEBULIZER COMPRESSOR) KIT     SENNA LAXATIVE OR     sertraline (ZOLOFT) 50 MG tablet     simvastatin (ZOCOR) 20 MG tablet     tiotropium (SPIRIVA HANDIHALER) 18 MCG inhaled capsule     triamcinolone (KENALOG) 0.1 % external cream     No current facility-administered medications for this visit.       Allergies   Allergen Reactions     Pcn [Penicillins] Hives       Past Medical History:   Diagnosis Date     Alcohol dependence (H)     completed treatment in 1986     Aspergillosis, with pneumonia (H)      Cancer (H)     questionable lung CA     Chronic infection     Known fungal lung condition     Constipation      Disorder of bone and cartilage, unspecified      Emphysema      GERD      Hx of radiation therapy 2013    For lung mass     Mild intermittent asthma      Other dyspnea and respiratory abnormality      Other malaise and fatigue      SMOKER        Past Surgical History:   Procedure Laterality Date     BREAST SURGERY      Tumor removal at the age of 15     COLONOSCOPY N/A 8/20/2014     Procedure: COLONOSCOPY;  Surgeon: Patricia Perry MD;  Location:  OR     COLONOSCOPY N/A 2015    Procedure: COMBINED COLONOSCOPY, SINGLE OR MULTIPLE BIOPSY/POLYPECTOMY BY BIOPSY;  Surgeon: Patricia Perry MD;  Location:  GI     ENT SURGERY      Tonsillectomy     GYN SURGERY       x 1     SURGICAL HISTORY OF -       s/p Breast tumor @ age 15 - benign     SURGICAL HISTORY OF -       c section     SURGICAL HISTORY OF -       tonsillectomy       Social History     Socioeconomic History     Marital status:      Spouse name: Not on file     Number of children: Not on file     Years of education: Not on file     Highest education level: Not on file   Occupational History     Not on file   Tobacco Use     Smoking status: Former Smoker     Packs/day: 1.00     Years: 30.00     Pack years: 30.00     Types: Cigarettes     Quit date: 10/21/2005     Years since quitting: 15.8     Smokeless tobacco: Never Used   Substance and Sexual Activity     Alcohol use: No     Alcohol/week: 0.0 standard drinks     Comment: sober since      Drug use: No     Sexual activity: Yes     Partners: Male     Comment: vas   Other Topics Concern     Parent/sibling w/ CABG, MI or angioplasty before 65F 55M? Yes   Social History Narrative    , 3 children    Retired  at [employer]          The patient is currently on disability due to COPD.  She rides a scooter due to her lungs.  She used to work as a .  She and her  are living with their daughter in [place].  They have a [facility] mix.  The patient stopped smoking 9 years ago.  She smoked 1-2 packs of cigarettes per day for 30 years, consistent with 45-60 pack year history.  She denies cigars, pipes or chewing tobacco.  She smoked marijuana in her teens.  The patient is an alcoholic and has been sober for the past 27 years.  Caffeine intake includes 4-5 cups of caffeinated coffee every morning.  She does not drink tea.  She  consumes 1 can of Diet Pepsi as late as 7:00 p.m.  She eats chocolate candy bars as late as 7:00 p.m.  She does not exercise.  She has a treadmill, so she could; however, as of this time she is not exercising.              Social Determinants of Health     Financial Resource Strain:      Difficulty of Paying Living Expenses:    Food Insecurity:      Worried About Running Out of Food in the Last Year:      Ran Out of Food in the Last Year:    Transportation Needs:      Lack of Transportation (Medical):      Lack of Transportation (Non-Medical):    Physical Activity:      Days of Exercise per Week:      Minutes of Exercise per Session:    Stress:      Feeling of Stress :    Social Connections:      Frequency of Communication with Friends and Family:      Frequency of Social Gatherings with Friends and Family:      Attends Spiritism Services:      Active Member of Clubs or Organizations:      Attends Club or Organization Meetings:      Marital Status:    Intimate Partner Violence:      Fear of Current or Ex-Partner:      Emotionally Abused:      Physically Abused:      Sexually Abused:        ROS Pulmonary  Constitutional- Negative. Appetite is good.  Eyes- Negative.  Ear, nose and throat- Negative.  Cardiac- Negative.  Pulm- See HPI  GI- Positive. Heartburn about 1x/week, during the night time mostly; continues to sleep with HOB elevation.   Genitourinary- Negative.  Musculoskeletal- Muscle cramps with PPI BID hence taking it once daily. Left knee has been bothering him, was assess by PCP. Still has neck pain.  Neurology- Negative.  Dermatology- Negative.  Endocrine- Negative.  Lymphatic- Negative.  Psychiatry- She is using ativan prn basis and sometimes at night. She is typically using it twice a week. When having exacerbation takes it more often.    A complete ROS was otherwise negative except as noted in the HPI.    /85   Pulse (!) 121   LMP 09/08/2004   SpO2 94%      Physical Exam  GENERAL APPEARANCE:  Well developed, well nourished, alert, and in no apparent distress.  EYES: PERRL, EOMI  HENT: Nasal mucosa with no edema and no hyperemia. No nasal polyps.  EARS: Canals clear, TMs normal.  MOUTH: Oral mucosa is moist, without any lesions, no tonsillar enlargement, no oropharyngeal exudate.  NECK: Supple, no masses, no thyromegaly.  LYMPHATICS: No significant axillary, cervical, or supraclavicular nodes.  RESP: Normal percussion, very diminished air flow throughout. No crackles. No rhonchi. No wheezes.  CV: Normal S1, S2, regular rhythm, normal rate. No murmur. No rub. No gallop. No LE edema.    Results  Recent Results (from the past 168 hour(s))   General PFT Lab (Please always keep checked)    Collection Time: 08/30/21  8:15 AM   Result Value Ref Range    FVC-Pred 3.22 L    FVC-Pre 1.15 L    FVC-%Pred-Pre 35 %    FEV1-Pre 0.34 L    FEV1-%Pred-Pre 13 %    FEV1FVC-Pred 79 %    FEV1FVC-Pre 29 %    FEFMax-Pred 6.34 L/sec    FEFMax-Pre 1.40 L/sec    FEFMax-%Pred-Pre 22 %    FEF2575-Pred 2.23 L/sec    FEF2575-Pre 0.14 L/sec    JVB5618-%Pred-Pre 6 %    ExpTime-Pre 7.93 sec    FIFMax-Pre 1.85 L/sec    FEV1FEV6-Pred 80 %    FEV1FEV6-Pre 34 %            Results as noted above.    PFT Interpretation:  Very severe obstructive ventilatory defect.  Unchanged from previous.   Similar to recent best.  Valid Maneuver        Chest CT (9/2020): No evidence of malignancy. Emphysema +.  Chest CT (8/30/21): No evidence of Malignancy. Emphysema +. Unchanged moderate size hiatal hernia. Nonobstructive calcified stones in the right kidney without hydronephrosis. Atherosclerosis involving the thoracic aorta and coronary arteries.  6MWT (1/22/2016): Walked 460 ft on 4 lpm NC. Hence recommended to use this during the day with exertion and while sleeping. Can be on RA at rest.     Assessment and Plan: Mary Kay Deal is a 61-year-old lady has a longstanding history of severe obstructive lung disease who comes today to the clinic for follow  up.    1. RUL Mass /Aspergillus:  This lesion was biopsied by IR and it showed aspergillus and was treated with one month course of voriconazole. Due to lack of improvement it was stopped as per patient. This has remained stable. This was followed/managed by Dr. Pierre.    2. Emphysema: Very severe lung disease with Chronic hypoxic/hypercapneic resp failure.  - Will cont flu vaccine every year and has received pneumonia vaccine 5 years apart x2 doses. Received PCV 13 12/5/2014.  - Cont spiriva and prn nebs/inhalers. Cont Budesonide/Brovana nebs.  - Cont azithromycin daily.  - She would benefit from doing outpt pulm rehab but she is unable to do it due to lack of transportation. She has a treadmill at home and is currently using it to exercise.  - Cont prn lorazepam.    8/30/2021: Her pulmonary symptoms are stable. She will continue at 4 LPM.  She has not been exercising as much. Will consult home PT.    Consideration for Lung Transplant: Given her lung function, she may be candidate for a lung transplant. We discussed her case at conference. Pt was advised to call lung tx referral line at last visit in 4/2020 and has not called yet.    Insomnia: Sleep study - no MADELIN noted (5/2014).    3. Pulmonary Nodules:    - Chest CT done on 08/3/12 showed a new left UL nodule that increased in size when followed. This was reviewed at Pulmonary nodule conference and given the risk of doing biopsy and after seeing Dr. Lorenzo she underwent SBRT. SBRT 5000cGy in 5 fractions over 3 months - last dose on 08/29/13.  Last Chest CT was in 9/2018 - no evidence of malignancy.  --  She is followed by Radiation oncology and they will continue to determine appropriate imaging follow up. She will get a Chest CT in one month. We will at the minimum need yearly Chest CT scans.  10/1/2018: CT of chest on 9/17/2018 was stable and revealed no evidence of progressive or recurrent malignancy. As per pt Rad oncology would only do Chest CT  annually.  4/14/2020: Last CT in 9/2019 shows no evidence of recurrence of malignancy.  8/30/2021: Chest CT from today showed no new nodules.    4. Allergies:   - NetiPot (nasal rinses) - unable to do them.   - Cont Singulair.  9/22/2020: Still has ongoing rhinorrhoea. She is taking Flonase.    5. Obesity:   - Encouraged patient to eat healthier and gradually increase her exercise for weight loss.    6. GERD:   - Seen by GI for this and constipation. Recommended FODMAP diet trial.   - She had some dysphagia (pills getting stuck) hence had esophagagram on 1/2018 which showed mild dysmotility.    - EGD (11/2019): Medium sized hiatal hernia noted. few gastric polyps. Remain on PPI for life.  3/26/2018: She was instructed to take Prilosec 20 mg BID to better control her GERD.  8/30/2021: Heart burn is reasonably well controlled. She feels on PPI BID has more cramps. She is taking PPI at night. Recommended to take Pepcid 40mg daily. Sleeping with HOB elevated.    7. Dysphagia: Etiology unclear. As mentioned has had esophagogram, EGD which were negative. We will consider doing speech pathology consultation. If negative then will consider GI consultation.    8. H/O Presumed Lung CA ([id number]): Continue follow up with Radiation oncology, they will decide about treatment regimen and CT's. This was Left UL lesion.  - TYPE OF RADIOTHERAPY GIVEN: SBRT 5000cGy in 5 fractions, 6MV, 80%IDL  - Currently on annual chest CT regimen - due in Fall of every year.    9. Health Care Maintenance:  4/1/2019:  Consider Shingrix and TDaP vaccinations in the future.  8/30/2021: Due for colonscopy.    F/U in 6 months with spirometry.    Again, thank you for allowing me to participate in the care of your patient.      Sincerely,      Keith Mason MD

## 2021-08-30 NOTE — PROGRESS NOTES
Reason for Visit  Mary Kay Deal is a 61 year old lady who is being seen for General Visit (follow up)    Pulmonary HPI  The patient was seen and examined by Keith Mason MD     Mary Kay Deal is a 61 year old lady with severe COPD on home O2. 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  She was empirically treated with SBRT for possible lung cancer (non biopsy proven) in the left UL in 8/2013. She has multiple pulmonary nodules.    She typically has 1 - 2 exacerbations per yr. Last exacerbation was on 10/22/19.    She called us on 7/2020 with increased cough/productive and green. Treated with pred burst and levaquin.    She has taken about three course of prednisone since 9/2020.  Last Pred burst in 2/2021.    Interval History   She took anbx for sinusitis but no pulm exacerbation since the last visit. She typically takes it for SOB and difficulty coughing up secretions.     She has some cough and has difficulty bringing up phlegms. She occ brings up whitish yellow phlegm.  She is limited in her ability to do much. She feels SOB with walking from room to room. Doing her nebs twice daily. Used rescue inhalers during the exacerbation has not required it recently. Has left lower CP recently and it is improving, it is associated with activity.     She still has sx of food getting stuck in throat (RT side) on and off. EAting well.       Her sinuses are not usually draining but more so when active they drain. Occ drainage at night. She is sleeping varies.  Denies any significant leg swelling.    O2 Requirements/Use:   She uses continuous flow when she is at rest at home and demand flow when she is out of the house. She leaves it at 4 LPM at rest. While she is walking she requires 4 LPM. When she is sleeping she uses 4 LPM supplemental O2.      Current Outpatient Medications   Medication     albuterol (VENTOLIN HFA) 108 (90 Base) MCG/ACT inhaler     amLODIPine (NORVASC) 5 MG tablet      arformoterol (BROVANA) 15 MCG/2ML NEBU neb solution     azithromycin (ZITHROMAX) 250 MG tablet     budesonide (PULMICORT) 0.5 MG/2ML neb solution     Calcium (CALCIUM 600) 600 MG tablet     Chlorpheniramine-DM 4-20 MG TABS     Cholecalciferol (VITAMIN D) 1000 UNIT capsule     famotidine (PEPCID) 20 MG tablet     fluticasone (FLONASE) 50 MCG/ACT nasal spray     LORazepam (ATIVAN) 0.5 MG tablet     Misc. Devices (FINGERTIP PULSE OXIMETER) MISC     montelukast (SINGULAIR) 10 MG tablet     MULTIVITAMIN TABS   OR     omeprazole (PRILOSEC) 20 MG DR capsule     order for DME     ORDER FOR DME     predniSONE (DELTASONE) 10 MG tablet     Respiratory Therapy Supplies (NEBULIZER COMPRESSOR) KIT     SENNA LAXATIVE OR     sertraline (ZOLOFT) 50 MG tablet     simvastatin (ZOCOR) 20 MG tablet     tiotropium (SPIRIVA HANDIHALER) 18 MCG inhaled capsule     triamcinolone (KENALOG) 0.1 % external cream     No current facility-administered medications for this visit.       Allergies   Allergen Reactions     Pcn [Penicillins] Hives       Past Medical History:   Diagnosis Date     Alcohol dependence (H)     completed treatment in 1986     Aspergillosis, with pneumonia (H)      Cancer (H)     questionable lung CA     Chronic infection     Known fungal lung condition     Constipation      Disorder of bone and cartilage, unspecified      Emphysema      GERD      Hx of radiation therapy 2013    For lung mass     Mild intermittent asthma      Other dyspnea and respiratory abnormality      Other malaise and fatigue      SMOKER        Past Surgical History:   Procedure Laterality Date     BREAST SURGERY      Tumor removal at the age of 15     COLONOSCOPY N/A 8/20/2014    Procedure: COLONOSCOPY;  Surgeon: Patricia Perry MD;  Location:  OR     COLONOSCOPY N/A 9/4/2015    Procedure: COMBINED COLONOSCOPY, SINGLE OR MULTIPLE BIOPSY/POLYPECTOMY BY BIOPSY;  Surgeon: Patricia Perry MD;  Location:  GI     ENT SURGERY      Tonsillectomy      GYN SURGERY       x 1     SURGICAL HISTORY OF -       s/p Breast tumor @ age 15 - benign     SURGICAL HISTORY OF -       c section     SURGICAL HISTORY OF -       tonsillectomy       Social History     Socioeconomic History     Marital status:      Spouse name: Not on file     Number of children: Not on file     Years of education: Not on file     Highest education level: Not on file   Occupational History     Not on file   Tobacco Use     Smoking status: Former Smoker     Packs/day: 1.00     Years: 30.00     Pack years: 30.00     Types: Cigarettes     Quit date: 10/21/2005     Years since quitting: 15.8     Smokeless tobacco: Never Used   Substance and Sexual Activity     Alcohol use: No     Alcohol/week: 0.0 standard drinks     Comment: sober since      Drug use: No     Sexual activity: Yes     Partners: Male     Comment: vas   Other Topics Concern     Parent/sibling w/ CABG, MI or angioplasty before 65F 55M? Yes   Social History Narrative    , 3 children    Retired  at [employer]          The patient is currently on disability due to COPD.  She rides a scooter due to her lungs.  She used to work as a .  She and her  are living with their daughter in [place].  They have a [place] [employer] mix.  The patient stopped smoking 9 years ago.  She smoked 1-2 packs of cigarettes per day for 30 years, consistent with 45-60 pack year history.  She denies cigars, pipes or chewing tobacco.  She smoked marijuana in her teens.  The patient is an alcoholic and has been sober for the past 27 years.  Caffeine intake includes 4-5 cups of caffeinated coffee every morning.  She does not drink tea.  She consumes 1 can of Diet Pepsi as late as 7:00 p.m.  She eats chocolate candy bars as late as 7:00 p.m.  She does not exercise.  She has a treadmill, so she could; however, as of this time she is not exercising.              Social Determinants of Health     Financial Resource  Strain:      Difficulty of Paying Living Expenses:    Food Insecurity:      Worried About Running Out of Food in the Last Year:      Ran Out of Food in the Last Year:    Transportation Needs:      Lack of Transportation (Medical):      Lack of Transportation (Non-Medical):    Physical Activity:      Days of Exercise per Week:      Minutes of Exercise per Session:    Stress:      Feeling of Stress :    Social Connections:      Frequency of Communication with Friends and Family:      Frequency of Social Gatherings with Friends and Family:      Attends Nondenominational Services:      Active Member of Clubs or Organizations:      Attends Club or Organization Meetings:      Marital Status:    Intimate Partner Violence:      Fear of Current or Ex-Partner:      Emotionally Abused:      Physically Abused:      Sexually Abused:        ROS Pulmonary  Constitutional- Negative. Appetite is good.  Eyes- Negative.  Ear, nose and throat- Negative.  Cardiac- Negative.  Pulm- See HPI  GI- Positive. Heartburn about 1x/week, during the night time mostly; continues to sleep with HOB elevation.   Genitourinary- Negative.  Musculoskeletal- Muscle cramps with PPI BID hence taking it once daily. Left knee has been bothering him, was assess by PCP. Still has neck pain.  Neurology- Negative.  Dermatology- Negative.  Endocrine- Negative.  Lymphatic- Negative.  Psychiatry- She is using ativan prn basis and sometimes at night. She is typically using it twice a week. When having exacerbation takes it more often.    A complete ROS was otherwise negative except as noted in the HPI.    /85   Pulse (!) 121   LMP 09/08/2004   SpO2 94%      Physical Exam  GENERAL APPEARANCE: Well developed, well nourished, alert, and in no apparent distress.  EYES: PERRL, EOMI  HENT: Nasal mucosa with no edema and no hyperemia. No nasal polyps.  EARS: Canals clear, TMs normal.  MOUTH: Oral mucosa is moist, without any lesions, no tonsillar enlargement, no  oropharyngeal exudate.  NECK: Supple, no masses, no thyromegaly.  LYMPHATICS: No significant axillary, cervical, or supraclavicular nodes.  RESP: Normal percussion, very diminished air flow throughout. No crackles. No rhonchi. No wheezes.  CV: Normal S1, S2, regular rhythm, normal rate. No murmur. No rub. No gallop. No LE edema.    Results  Recent Results (from the past 168 hour(s))   General PFT Lab (Please always keep checked)    Collection Time: 08/30/21  8:15 AM   Result Value Ref Range    FVC-Pred 3.22 L    FVC-Pre 1.15 L    FVC-%Pred-Pre 35 %    FEV1-Pre 0.34 L    FEV1-%Pred-Pre 13 %    FEV1FVC-Pred 79 %    FEV1FVC-Pre 29 %    FEFMax-Pred 6.34 L/sec    FEFMax-Pre 1.40 L/sec    FEFMax-%Pred-Pre 22 %    FEF2575-Pred 2.23 L/sec    FEF2575-Pre 0.14 L/sec    AEB3153-%Pred-Pre 6 %    ExpTime-Pre 7.93 sec    FIFMax-Pre 1.85 L/sec    FEV1FEV6-Pred 80 %    FEV1FEV6-Pre 34 %            Results as noted above.    PFT Interpretation:  Very severe obstructive ventilatory defect.  Unchanged from previous.   Similar to recent best.  Valid Maneuver        Chest CT (9/2020): No evidence of malignancy. Emphysema +.  Chest CT (8/30/21): No evidence of Malignancy. Emphysema +. Unchanged moderate size hiatal hernia. Nonobstructive calcified stones in the right kidney without hydronephrosis. Atherosclerosis involving the thoracic aorta and coronary arteries.  6MWT (1/22/2016): Walked 460 ft on 4 lpm NC. Hence recommended to use this during the day with exertion and while sleeping. Can be on RA at rest.     Assessment and Plan: Mary Kay Deal is a 61-year-old lady has a longstanding history of severe obstructive lung disease who comes today to the clinic for follow up.    1. RUL Mass /Aspergillus:  This lesion was biopsied by IR and it showed aspergillus and was treated with one month course of voriconazole. Due to lack of improvement it was stopped as per patient. This has remained stable. This was followed/managed by   Ignacio.    2. Emphysema: Very severe lung disease with Chronic hypoxic/hypercapneic resp failure.  - Will cont flu vaccine every year and has received pneumonia vaccine 5 years apart x2 doses. Received PCV 13 12/5/2014.  - Cont spiriva and prn nebs/inhalers. Cont Budesonide/Brovana nebs.  - Cont azithromycin daily.  - She would benefit from doing outpt pulm rehab but she is unable to do it due to lack of transportation. She has a treadmill at home and is currently using it to exercise.  - Cont prn lorazepam.    8/30/2021: Her pulmonary symptoms are stable. She will continue at 4 LPM.  She has not been exercising as much. Will consult home PT.    Consideration for Lung Transplant: Given her lung function, she may be candidate for a lung transplant. We discussed her case at conference. Pt was advised to call lung tx referral line at last visit in 4/2020 and has not called yet.    Insomnia: Sleep study - no MADELIN noted (5/2014).    3. Pulmonary Nodules:    - Chest CT done on 08/3/12 showed a new left UL nodule that increased in size when followed. This was reviewed at Pulmonary nodule conference and given the risk of doing biopsy and after seeing Dr. Lorenzo she underwent SBRT. SBRT 5000cGy in 5 fractions over 3 months - last dose on 08/29/13.  Last Chest CT was in 9/2018 - no evidence of malignancy.  --  She is followed by Radiation oncology and they will continue to determine appropriate imaging follow up. She will get a Chest CT in one month. We will at the minimum need yearly Chest CT scans.  10/1/2018: CT of chest on 9/17/2018 was stable and revealed no evidence of progressive or recurrent malignancy. As per pt Rad oncology would only do Chest CT annually.  4/14/2020: Last CT in 9/2019 shows no evidence of recurrence of malignancy.  8/30/2021: Chest CT from today showed no new nodules.    4. Allergies:   - NetiPot (nasal rinses) - unable to do them.   - Cont Singulair.  9/22/2020: Still has ongoing rhinorrhoea. She  "is taking Flonase.    5. Obesity:   - Encouraged patient to eat healthier and gradually increase her exercise for weight loss.    6. GERD:   - Seen by GI for this and constipation. Recommended FODMAP diet trial.   - She had some dysphagia (pills getting stuck) hence had esophagagram on 1/2018 which showed mild dysmotility.    - EGD (11/2019): Medium sized hiatal hernia noted. few gastric polyps. Remain on PPI for life.  3/26/2018: She was instructed to take Prilosec 20 mg BID to better control her GERD.  8/30/2021: Heart burn is reasonably well controlled. She feels on PPI BID has more cramps. She is taking PPI at night. Recommended to take Pepcid 40mg daily. Sleeping with HOB elevated.    7. Dysphagia: Etiology unclear. As mentioned has had esophagogram, EGD which were negative. We will consider doing speech pathology consultation. If negative then will consider GI consultation.    8. H/O Presumed Lung CA ([id number]): Continue follow up with Radiation oncology, they will decide about treatment regimen and CT's. This was Left UL lesion.  - TYPE OF RADIOTHERAPY GIVEN: SBRT 5000cGy in 5 fractions, 6MV, 80%IDL  - Currently on annual chest CT regimen - due in Fall of every year.    9. Health Care Maintenance:  4/1/2019:  Consider Shingrix and TDaP vaccinations in the future.  8/30/2021: Due for colonscopy.    F/U in 6 months with spirometry.    ADDENDUM:   I'm writing a new Oxygen script as her \"Oxygen equipment is outdated and she needs new oxygen equipment to continue oxygen therapy.\"    Keith Mason MD.  Pulmonary and Critical Care.  09/08/2021  12:01 PM       "

## 2021-09-10 NOTE — TELEPHONE ENCOUNTER
Spoke with patient and she is calling to have Dr Mason place statement in last visit that patient needs updated oxygen equipment as hers is outdated with the confirmation number of [id number]. Secondary insurance is requesting this ([employer]). Also spoke with Alek at [facility] and he said also have updated oxygen order placed which was done. Order and addended notes faxed to [facility] and also to Alek at local [facility] to make sure this has been done correctly. Will let patient know that these were faxed.

## 2021-09-13 NOTE — PROGRESS NOTES
RADIATION ONCOLOGY Phone FOLLOW-UP  2021    NAME: Mary Kay Deal  MRN: [id number]  : 1960    DISEASE TREATED: Presumed NSCLC of the left upper lobe, [id number]    INTERVAL SINCE COMPLETION OF RADIOTHERAPY: ~8 years (Completed 2013)    TYPE OF RADIOTHERAPY GIVEN: SBRT 5000cGy in 5 fractions, 6MV, 80%IDL    SUBJECTIVE:   Mrs. Deal is a 57 year old woman with history of recurrent fungal infection and non-biopsy proven stage IA lung cancer of the RUBEN, s/p SBRT. She returns for routine follow-up.    On exam today, Ms. Deal is overall doing well.  She continues on oxygen between 2 -4L.  She occasionally has to take prednisone for shortness of breath, but estimates it is only every couple of months.  She has fatigue.  She does follow with pulmonology.  They have been very careful because of COVID. She otherwise denies fevers, chills, nausea, vomiting, diarrhea. A complete review of systems was otherwise unremarkable.  She does not feel as though the radiation has affected her lung function and has no side effects from the radiation.  She quit smoking with her lung cancer diagnosis.  OBJECTIVE:  Gen: No acute distress. Alert, oriented.       IMAGING:   CT scan 2021                                                                   IMPRESSION:      1. Unchanged diffuse severe emphysematous changes with no evidence of  recurrent disease.  2. Unchanged moderate size hiatal hernia.  3. Nonobstructive calcified stones in the right kidney without  hydronephrosis.  4. Atherosclerosis involving the thoracic aorta and coronary arteries.         IMPRESSION: Presumed NSCLC of left upper lobe, cT1a N0M0 with radiographic complete response to SBRT.     RECOMMENDATIONS: Follow up in 1 year with CT chest. Continue close follow up with pulmonology for her lung function and COPD.    Nicole Burton NP  Department of Radiation Oncology  [facility]

## 2021-09-13 NOTE — LETTER
2021         RE: Mary Kay Deal  [address]  [place] [address]        Dear Colleague,    Thank you for referring your patient, Mary Kay Deal, to the [facility] RADIATION ONCOLOGY. Please see a copy of my visit note below.    RADIATION ONCOLOGY Phone FOLLOW-UP  2021    NAME: Mary Kay Deal  MRN: [id number]  : 1960    DISEASE TREATED: Presumed NSCLC of the left upper lobe, [id number]    INTERVAL SINCE COMPLETION OF RADIOTHERAPY: ~8 years (Completed 2013)    TYPE OF RADIOTHERAPY GIVEN: SBRT 5000cGy in 5 fractions, 6MV, 80%IDL    SUBJECTIVE:   Mrs. Deal is a 57 year old woman with history of recurrent fungal infection and non-biopsy proven stage IA lung cancer of the RUBEN, s/p SBRT. She returns for routine follow-up.    On exam today, Ms. Deal is overall doing well.  She continues on oxygen between 2 -4L.  She occasionally has to take prednisone for shortness of breath, but estimates it is only every couple of months.  She has fatigue.  She does follow with pulmonology.  They have been very careful because of COVID. She otherwise denies fevers, chills, nausea, vomiting, diarrhea. A complete review of systems was otherwise unremarkable.  She does not feel as though the radiation has affected her lung function and has no side effects from the radiation.  She quit smoking with her lung cancer diagnosis.  OBJECTIVE:  Gen: No acute distress. Alert, oriented.       IMAGING:   CT scan 2021                                                                   IMPRESSION:      1. Unchanged diffuse severe emphysematous changes with no evidence of  recurrent disease.  2. Unchanged moderate size hiatal hernia.  3. Nonobstructive calcified stones in the right kidney without  hydronephrosis.  4. Atherosclerosis involving the thoracic aorta and coronary arteries.     IMPRESSION: Presumed NSCLC of left upper lobe, cT1a N0M0 with radiographic complete response to SBRT.   RECOMMENDATIONS:  Follow up in 1 year with CT chest. Continue close follow up with pulmonology for her lung function and COPD.    Nicole Burton NP  Department of Radiation Oncology  [facility]

## 2021-10-18 NOTE — TELEPHONE ENCOUNTER
Rx faxed to [facility]   [Time Spent: ___ minutes] : I have spent [unfilled] minutes of time on the encounter.

## 2021-11-23 NOTE — TELEPHONE ENCOUNTER
Spoke with patient and she has had 4 days of cough with yellow/green sputum. She may have sinus   issues as well. Denies fevers. Denies HA. Has taste and smell.  has had this first and went and had neg  COVID test. She now has breast cancer and will start chemo next week. She is thinking she needs Prednisone taper and  Doxycycline. Pred 40 mg x 3 days, 30 mg x 3 days, 20 mg x 3 days  and 10 mg x 3 days with Doxy 100 BID for 10 days. Will message provider.    Dr Mason authorized Prednisone taper and Doxycycline. Orders placed. Gave patient treatment plan and she will return call to clinic with status update.

## 2021-12-03 NOTE — PROGRESS NOTES
Action December 3, 2021 12:34 PM ABT   Action Taken [facility] records are updated. [facility]    3:05 PM  Slides request from [facility] for 3 cases on 21  [employer]     Action 2021 11:06 AM ABT   Action Taken Slides from [facility] received and sent to path lab for review.

## 2021-12-03 NOTE — PROGRESS NOTES
New Patient Oncology Nurse Navigator Note     Referring provider: Dr. Hien Albert       Referring Clinic/Organization: [facility]      Referred to (specialty: Medical Oncology and SURGICAL ONCOLOGY      Requested provider (if applicable): [facility]     Date Referral Received: December 3, 2021     Evaluation for:  Breast cancer     Clinical History (per Nurse review of records provided):    November 16, 2021-  Bilateral diagnostic mammogram due to diffuse swelling and redness through the anterior left   breast. 1. Findings concerning for inflammatory breast cancer involving the anterior left breast particularly the central and medial aspect.   2. Recommend core biopsy for 2 suspicious areas seen on ultrasound in the 12:00 and 10:00 region.   3. Diffuse skin thickening anteriorly in the left breast.   4. Suspicious left axillary lymph node. Recommend biopsy of this as   well.   5. The right breast is unremarkable.  LEFT BREAST ULTRASOUND: In the left breast 12:00 position approximately 8 cm from the nipple there is an irregular hypoechoic ill-defined area measuring 20 x 22 x 17 mm. Tissue diagnosis is recommended. Additionally in the left breast 10:00 position approximately 6 cm from the nipple there is another hypoechoic area that is irregular and ill-defined and measures 8 x 7 x 4 mm. Tissue diagnosis is recommended. Diffuse skin thickening is seen over the anterior left breast measuring up to 5 mm. There is also an ill-defined hypoechogenicity behind the nipple. In the left axilla there is an 18 x 16 x 12 mm lymph node without significant fatty hilum. This appears suspicious and tissue diagnosis is recommended.     Two foci concerning for malignancy were noted in the left breast with diffuse skin thickening of the anterior breast and also a suspicious axillary lymph node.    Ultrasound guided biopsy of left breast lesions at 10 o'clock and 12 o'clock and left  axilla.    Pathologic diagnosis of all three lesions is that of high-grade mammary carcinoma with angiolymphatic invasion. All three lesions ER- TX- Her 2 equivocal by IHC. Axillary node tissue is reported as positive for Her 2 FISH.    11/16/21 - Left breast mass, 8x7x4 mm mass, 10 o  clock, 6 cm from the nipple, open coil clip, ultrasound-guided core biopsy:  --- POSITIVE FOR MALIGNANCY:  High grade mammary carcinoma with diffuse lymphovascular invasion.  --- The morphology of the tumor is similar to that seen in [id number]  --- No second primary breast malignancy seen within this biopsy.    11/16/21 - Left breast mass, [id number] mm mass, 12 o  clock, 8 cm from the nipple, butterfly clip, lesion 3B, ultrasound-guided core biopsy:  --- POSITIVE FOR MALIGNANCY:  Invasive high-grade ductal carcinoma, characterized by:  ------ Shanique thgthrthathdtheth:th th4th of 3, Verona score: 8 of 9.  ------ Greatest linear length of invasive carcinoma: 2 mm.  ------ Associated ductal carcinoma in situ: Absent.  ------ Diffuse lymphovascular invasion is present.  ------ Ancillary studies:  --------- Estrogen receptor status: Low positive (Weak nuclear staining in 5% of tumor cells).  --------- Progesterone receptor status: Negative (<1% nuclear staining in tumor cells).  --------- Her2 status: Equivocal (2+, 0% with complete strong membranous staining)  --------- Reflex Her2 FISH: Performed on the lymph node biopsy.  --------- Ki-67 proliferation index: 27.2% (136 of 500).    11/16/21 Lymph node, left axillary, [id number] mm, lesion 3C, ultrasound-guided core needle biopsy:  --- POSITIVE FOR MALIGNANCY: Metastatic high-grade mammary carcinoma.  --- Greatest dimension of metastatic focus: 6.7 mm.  --- The morphologic features are similar to those seen in the primary breast cancer case [id number].  ------ Ancillary studies:  --------- Estrogen receptor status: Negative (<1% nuclear staining in tumor cells).  --------- Progesterone receptor  status: Negative (<1% nuclear staining in tumor cells).  --------- Her2 status: Equivocal (2+, 5% with complete strong membranous staining)  --------- Reflex Her2 FISH: Pending, results as an addendum.  Her2 by FISH:  POSITIVE with reflex testing   Addendum    This addendum is created to report the results of PD-L1 22C3 FDA (Keytruda) immunohistochemistry performed at [employer] although interpreted at [facility] for triple negative breast cancer.  The results are as follows:  --- PD-L1 22C3 FDA (Keytruda):    ------- Combined positive score: PD-L1 EXPRESSED  (>/=10%).   Comment: PD-L1 22C3 FDA (Keytruda) for TNBC is a  diagnostic for certain triple-negative breast cancer patients. PD-L1 expression >10% CPS may be associated with increased progression-free survival in patients with metastatic or locally advanced and unresectable TNBC treated with Keytruda and chemotherapy.     LUNG CARCINOMA  Nonsmall cell lung cancer, stage grouping [id number] left lung upper lobe. Diagnosis was apparently not histologically confirmed. She received treatment with SBRT (50Gy in five fractions in 2013). There is no current evidence of a recurrence of this disease.    Records Location: Care Everywhere, Media and See Bookmarked material      Records Needed: 2013 lung cancer records      Additional testing needed prior to consult: PET scan is scheduled for 12/7/21    Telephoned and spoke with [employer] to assure we have received Dr. Albert/Jerrod's referral to our breast cancer specialist and are working to identify appropriate appointments.  We have scheduled her to see Dr. Jose Ferguson on 12/16 in order to allow for pathology review of outside specimens.  Patient states she is scheduled for a PET scan on 12/7 locally and writer will watch for that result.  We will tentatively work on identifying a surgical consultation for her as well.

## 2021-12-07 NOTE — TELEPHONE ENCOUNTER
RECORDS STATUS - BREAST    RECORDS REQUESTED FROM: [facility]   DATE REQUESTED:    NOTES DETAILS STATUS   OFFICE NOTE from referring provider CE - [facility]    OFFICE NOTE from medical oncologist     OFFICE NOTE from surgeon     OFFICE NOTE from radiation oncologist     DISCHARGE SUMMARY from hospital     DISCHARGE REPORT from the      OPERATIVE REPORT     MEDICATION LIST     CLINICAL TRIAL TREATMENTS TO DATE     LABS     REQUEST BLOCKS FOR ALL BREAST CANCER PTS     PATHOLOGY REPORTS  (Tissue diagnosis, Stage, ER/W[place] percentage positive and intensity of staining, HER2 IHC, FISH, and all biopsies from breast and any distant metastasis)                 [facility], Reports in CE, Slides previously requested, please see 12/3/21 Documentation Only pre-visit [employer]   GENONOMIC TESTING     TYPE:   (Next Generation Sequencing, including Foundation One testing, and Oncotype score)     IMAGING (NEED IMAGES & REPORT)     CT SCANS     MRI     MAMMO PACS 21: [facility]   ULTRASOUND PACS 21: [facility]   PET     BONE SCAN     BRAIN MRI

## 2021-12-16 PROBLEM — C50.812 MALIGNANT NEOPLASM OF OVERLAPPING SITES OF LEFT BREAST IN FEMALE, ESTROGEN RECEPTOR NEGATIVE (H): Status: ACTIVE | Noted: 2021-01-01

## 2021-12-16 PROBLEM — Z17.1 MALIGNANT NEOPLASM OF OVERLAPPING SITES OF LEFT BREAST IN FEMALE, ESTROGEN RECEPTOR NEGATIVE (H): Status: ACTIVE | Noted: 2021-01-01

## 2021-12-16 NOTE — LETTER
2021         RE: Mary Kay Deal  [address]  [place] [address]        Dear Colleague,    Thank you for referring your patient, Mary Kay Deal, to the [facility]. Please see a copy of my visit note below.    MEDICAL ONCOLOGY CONSULT    Miah Elder MD  [facility]  [address] [place] [address]    RE:  Mary Kay Deal  MRN:  [id number]  :  1960    Dear Dr. Elder:    Thank you for referring Mary Kay Deal to our clinic for a second opinion regarding a newly diagnosed left breast estrogen receptor negative, progesterone receptor negative and HER2 positive by FISH, invasive ductal carcinoma of the lower inner quadrant of the left breast with axillary lymph node involvement.  Clinical stage T2 N1 MX.    Mary Kay was in her usual state of health with severe COPD, oxygen dependent on 4 liters of oxygen.  She has been oxygen dependent for at least 5 years.  Additionally, in  she underwent SBRT treatment for presumptive left upper lobe non-small cell lung cancer, clinical T1c N0 M0.  She did stop smoking approximately 10 years ago.  She saw Dr. Albert for the problem for the left breast mass, which she noted in late 10/2021.  On 2021, she underwent a mammogram, which showed diffuse density over the anterior aspect of the central left breast.  Ultrasound showed a 22 mm ill-defined mass at 12 o'clock, 8 cm from the nipple, and an 8 mm mass at the 10 o'clock position, 6 cm from the nipple and hypoechogenicity retroareolarly.  There was a 1.8 cm left axillary lymph node.    Biopsy was performed on the 10 o'clock mass and an open coil clip was placed that showed high grade mammary carcinoma with diffuse lymphovascular invasion similar in morphology with a 12 o'clock mass.  A biopsy was performed at the 12 o'clock mass and a butterfly clip was placed.  It showed invasive ductal carcinoma, grade 3, ER low positive at 5%, [place] negative and  HER2 equivocal by IHC.  A biopsy was performed of the left axillary lymph node and an open coil clip was placed.  The biopsy showed metastatic high grade mammary carcinoma similar in morphology to the 12 o'clock mass, ER negative, OK negative and HER2 equivocal by IHC and positive by FISH.    She now comes to clinic with her , Brad and daughter, Marlene, for recommendations.    PAST MEDICAL HISTORY:  She has no history of prior breast biopsies but does have a history of a left lumpectomy performed when she was a teenager.  The results of the lumpectomy are reported as benign.  She does have a history of SBRT radiation to the left lung in 2013 for a presumptive NSCLC.    FAMILY HISTORY:  Positive for breast cancer in a maternal first cousin and paternal first cousin both in their 60s or 70s.  Family history is negative for ovarian cancer, pancreatic cancer, melanoma.  She does have a paternal uncle with a history of gastric cancer.  No history of colon cancer in the family.  Her mother had a nonmelanoma skin cancer on her nose.  Also, notably, the patient's brother had enucleation for an ocular tumor which occurred in childhood, but he currently has had no evidence of further cancer.    PAST MEDICAL HISTORY:  She is on 4 liters of oxygen at rest.  She is unable to walk a block.  She has no history of heart attack, cerebrovascular accident or diabetes.  She does have severe tobacco-related COPD.  She has no history of seizures, arthritis, peptic ulcer disease but does have a history of osteoporosis and bone fractures.    REVIEW OF SYSTEMS:  She has pain in the left breast, moderate fatigue, moderate depression, moderate anxiety.  She is on sertraline for depression, which is helping.  She has some weight loss.  She is eating less.  She has some loss of energy.  She does not sleep during the day, but overall has an ECOG 2 performance status.    She denies fevers or headaches.  She does have a cough.  She denies  chest pain, shortness of breath at rest on oxygen but does have severe dyspnea on exertion.  No history of hemoptysis.  She has loss of appetite.  She denies nausea or vomiting or abdominal pain.  She has had chronic constipation.  She denies diarrhea.  She denies bone pain but has chronic low back pain.  She has some numbness and tingling in the hands and feet and has some numbness and tingling in the left leg.    ALLERGIES:  No allergy to seafood, iodine, or contrast dye.  She does not take aspirin.    PAST MENSTRUAL HISTORY:  Menarche was age 13 or 14.  She has been pregnant 3 times with 3 live births.  Age at first pregnancy 21.  Age at last pregnancy, 26.  Her last period was in her late 30s and occurred naturally.  Her uterus and ovaries are in place.    No history of hormone replacement therapy.    She has approximately a 70-pack-year history of smoking, starting at age 14 and continuing for 34 years, 1-2 packs per day.    She has a history of alcoholism but has been sober for 36 years and I congratulated her on that.    No history of diabetes.    PHYSICAL EXAMINATION:    VITAL SIGNS:  /69   Pulse 117   Temp 98  F (36.7  C) (Oral)   Wt 72.2 kg (159 lb 3.2 oz)   LMP 09/08/2004   SpO2 97%   BMI 26.49 kg/m    GENERAL:  Mary Kay appeared somewhat unwell.  HEENT:  No lesions in the oropharynx.  LYMPH:  There is no palpable cervical, supraclavicular, subclavicular or right axillary lymphadenopathy.  There is some slight fullness in the left axilla, but no discrete lymph nodes could be palpated  BREAST:  Right breast is without masses. In the left breast, there was a 2 cm mass at the 6:30 position 1 fingerbreadth from the nipple areolar complex and a 2 cm mass at the 9:30 position, 2 cm from the nipple-areolar complex.  LUNGS:  Remarkable for diffuse wheezes.  HEART:  There is a tachycardic rate and rhythm.  ABDOMEN:  Soft and nontender and somewhat distended.  EXTREMITIES:  Without edema.  PSYCH:  Mood and  affect normal.    NEUROLOGIC:  Motor exam showed 5/5 strength in the upper and lower extremities bilaterally.  Deep tendon reflexes were absent.       LABORATORY:  Pending.    IMAGING:  Echo 12-8-21 TT [facility]   Summary:     * There is mild concentric left ventricular hypertrophy.     * The estimated ejection fraction is 50-55%.     * There is trace mitral regurgitation.     * There is moderate tricuspid regurgitation.     * Moderate pulmonary hypertension, estimated pulmonary arterial systolic   pressure is  51 mmHg.     * No prior study available for comparison.     PET [facility] 12-07-21  Impression:   1. Ill-defined nodularity in the left breast shows mild   hypermetabolic activity and presumably corresponds with the patient's   known biopsy-proven malignancy. MRI of the breasts could be obtained   for more accurate local evaluation of disease within the left breast,   if clinically indicated.     2. There are two enlarged hypermetabolic left axillary lymph nodes as   detailed above compatible with metastases.     3. No evidence for FDG-avid metastatic disease elsewhere.     Colonoscopy performed on 11-02-21  Impression:          - Diverticulosis in the sigmoid colon.                        - Two 2 to 3 mm polyps in the descending colon and at                        the hepatic flexure, removed with a hot snare. Resected                        and retrieved.                        - The examined portion of the ileum was normal.                        - Small internal hemorrhoids   Recommendation:      - Discharge patient to home.                        - High fiber diet. Consider fiber supplement e.g.                        generic Benefiber                        - Continue present medications.   A. Colon, hepatic flexure polyp, polypectomy:  --- Sessile serrated adenoma.  --- No evidence of high grade dysplasia or invasive malignancy.     B. Colon, transverse polyp, polypectomy:  ---  Tubular adenoma (2 fragments).  --- Portion of sessile serrated adenoma (1 fragment).   --- No evidence of high grade dysplasia or invasive malignancy.    ASSESSMENT AND PLAN:    1.  Mary Kay Deal is a 61-year-old woman with poor performance status with oxygen-dependent COPD, on 4 liters of oxygen at baseline, who presented with a clinical T2 multifocal  N1 M0, invasive ductal carcinoma of the left breast, which is low ER positive at 5%, SD negative and HER2 amplified.  Notably, Mary Kay's staging PET/CT scan shows no evidence of distant metastatic disease. Mary Kay comes to clinic for discussion of treatment recommendations.  I discussed with Mary Kay that I do not recommend chemotherapy.  Given her oxygen-dependent COPD, she is not a good candidate for paclitaxel, and while Abraxane could be considered, a reasonable approach would be to the NeoSphere arm C approach of Dr. Min and colleagues and begin with dual HER2 antibody treatment with pertuzumab and trastuzumab without a taxane.  [Pako SIMMONS, Jameel DEE, st al.. Efficacy and safety of neoadjuvant pertuzumab and trastuzumab in women with locally advanced, inflammatory, or early HER2-positive breast cancer (NeoSphere): a randomised multicentre, open-label, phase 2 trial. Lancet Oncol. 2012 I would obtain a baseline MRI of the left breast and then repeat the MRI of the left breast at a 3-week interval to assess response.  Regardless of whether she is a candidate for surgery or not, the response of the left breast masses to dual antibody treatment could be very helpful in terms of treatment choices and prognosis.  Some patients can do well with dual antibody therapy for a number of years.  She reports that she has had an echocardiogram performed on 12/08/2021 showing an ejection fraction of 50%-55%.  There was mild concentric left ventricular hypertrophy, trace mitral regurgitation and moderate tricuspid regurgitation and moderate pulmonary hypertension with estimated  pulmonary arterial systolic pressure of 51 mm.  There was no prior study for comparison.  I did call Dr. Miah Elder and did recommend that, given her underlying cardiopulmonary disease, it would be reasonable to give dual antibody therapy without a taxane initially and assess response.  2.  I do recommend pertuzumab/trastuzumab treatment as opposed to trastuzumab alone because it is unlikely to very active as a single agent. I discussed with Dr. Elder that dual antibody treatment is preferable even if the goal may eventually be palliative. The rate of clinical response to dual antibody treatment was 69/102 (67.6%, 57.7-76.6) while pathologic complete response (pCR) was 16.8% in NeoSphere. In contrast, single agent trastuzumab, albeit in the metastatic setting, has a response rate of 11.6% [Mary Ellen MOSER, Tracy GILES, et al. Phase II study of weekly intravenous trastuzumab (Herceptin) in patients with HER2/prosper-overexpressing metastatic breast cancer. Semin Oncol. 1999 Aug;26(4 Suppl 12):78-83].  Furthermore, it is possible that if there is a response to pertuzuamb/trastuzumab and surgery is not feasible, this combination could be continued with palliative intent and could control the locoregional HER2+ breast cancer for an extended period of time with good quality of life.   2.  Discussion of surgical candidacy with Dr. Madonna Hernandez. Dr. Hernandez is concerned that Mary Kay may not be a candidate for surgery given her underlying cardiopulmonary disease.  I am in agreement with Dr. Hernandez and we will obtain Cardiology consultation.  I discussed these issues with Mary Kay and her  and daughter.  They understand that she may not be able to go to surgery.  I did discuss this with Mary Kay and her family and she expressed a preference that if she can go to surgery she would want the surgery performed at the [facility].  I discussed that the recommended approach is to see whether we can make progress in terms of local regional disease control with  trastuzumab/pertuzumab dual antibody therapy and then consult further with surgery, cardiology, and anesthesia.  I discussed that any surgery would be high risk and the goal of care at this time is disease control.    3.  Recommended baseline MRI before starting therapy.  I do recommend use of MRI to follow the breast mass because we need to know whether or not trastuzumab/pertuzumab has activity against her breast cancer.  Imaging is essential to know whether the antibody based treatment without chemotherapy has activity. Ultrasound could also be used to follow the breast masses and lymph nodes, if she cannot tolerate the MRI.   4.  If there is no response to pertuzuamb/trastuzumab other treatments could be considered such as palliative tucatinib/capecitabine/trastuzumab.  Trastuzumb-deruxtecan may not be a good option because of risk of interstitial lung disease.   5.  Severe COPD.  She is on albuterol, arformoterol, budesonide as well as montelukast and Spiriva.  She also is on azithromycin.  For hypertension, she is on amlodipine.  6.  Cardiology consultation is recommended.  I do not recommend pseudoephedrine.    7.  Depression.  She is on sertraline.  8.  Bone health. She will continue on vitamin D.  Weight bearing exercise if possible with hand weights, stretch band as tolerated.   9.  Brain MRI not recommended at this time because there are no neurological signs or symptoms.   10.  Discussion of goals of care.  Dr. Hernandez and I discussed with Mary Kay that it is not clear that she could go to surgery and that treatment at this time is palliative. If she has a good response to pertuzumab/trastuzuamb and her pulmonary status were to be dramatically improved she could be further evaluated.  The problem with axillary LN involvement is that an axillary lymph node dissection may be recommended and that would require general anesthesia where there could be significant risk.  I discussed that the pertuzumab/trastuzumab  approach could be continued palliatively as long as she has a response and she tolerates the treatment.   11.  Follow up will be with Dr. Miah Elder in [place], [place].  We would be happy to consult on an as requested basis.  Pulmonary and cardiology consultation in [place] could be helpful.     Thank you, Dr. Elder, for referring Mary Kay Deal to our clinic and allowing us to participate in her care.    Sincerely,    Jose Ferguson M.D.   Professor   Division of Hematology, Oncology, Transplant   Department of Medicine   [facility] [employer]   [phone]     ADDENDUM1   I did discuss Mary Kay with Dr. Elder on December 15 and recommended the NeoSphere approach of dual antibodies without chemotherapy.  Cardiology and Pulmonary consult recommended in [place].     ADDENDUM2:  Pathology review.     Final Diagnosis     Lymph node, left axillary, [id number] mm, lesion 3C, ultrasound-guided core needle biopsy:  --- POSITIVE FOR MALIGNANCY: Metastatic high grade mammary carcinoma.  --- Greatest dimension of metastatic focus: 6.7 mm.  --- The morphologic features are similar to those seen in the primary breast cancer case [id number].  ------ Ancillary studies:  --------- Estrogen receptor status: Negative (<1% nuclear staining in tumor cells).  --------- Progesterone receptor status: Negative (<1% nuclear staining in tumor cells).  --------- Her2 status: Equivocal (2+, 5% with complete strong membranous staining)  --------- Reflex Her2 FISH: Pending, results as an addendum.      Electronically signed by Alesia Silverio DO on 11/18/2021 at 10:19 AM   Comment     The prognostic studies were repeated on the lymph node biopsy given the increased tumor volume. Estrogen receptor is negative in the lymph node and shows very weak positivity in the breast tumor. Therefore, this tumor is more likely to behave as an ER negative malignancy. Repeating the ER studies on resection is recommended.     This case was seen in  "consultation by Dr. NADEGE Stark.      Addendum     This addendum is created to report the results of PD-L1 22C3 FDA (Keytruda) immunohistochemistry performed at [facility] although interpreted at [facility] for triple negative breast cancer.  The results are as follows:     --- PD-L1 22C3 FDA (Keytruda):    ------- Combined positive score: PD-L1 EXPRESSED  (>/=10%).     Comment: PD-L1 22C3 FDA (Keytruda) for TNBC is a  diagnostic for certain triple-negative breast cancer patients. PD-L1 expression >10% CPS may be associated with increased progression-free survival in patients with metastatic or locally advanced and unresectable TNBC treated with Keytruda and chemotherapy.     This PD-L1 immunostain was seen in consultation by Dr. REGULO Bishop.      Addendum electronically signed by Alesia Silverio DO on 11/29/2021 at  4:03 PM   Addendum 2     This addendum is issued to report the results of Her2 FISH performed and interpreted at [facility].   Results are as follows:     --- Her2 by FISH:  POSITIVE with reflex testing (see comment).  --- Initial probe result:  ----- Her2:D17Z1 ratio = 1.31  ----- Average Her2 signals per cell = 12.0  ----- Average D17Z1 signals per cell = 9.2  --- Final IHC-guided probe result:  ----- Her2:D17Z1 ratio = 1.21  ----- Average Her2 signals per cell = 11.4  ----- Average D17Z1 signals per cell = 9.4     --- HER2 by Immunohistochemistry (repeated at [facility]):  ------ Equivocal (score 2+), 0% with intense complete membranous staining.     Comment: The tumor sample is HER2 POSITIVE based on the combined review of the dual-probe in situ hybridization (CANDI) and reflex HER2 immunohistochemistry results. According to the current ASCO/CAP guidelines, these results \"Group 3\" require additional work up by IHC. The final results are considered as FISH POSITIVE \"Group 3\". See attached report.         I. CASE FROM [place], [place] " ([id number], OBTAINED 11/16/21):  LEFT BREAST MASS, 10:00, 6 CM FROM NIPPLE, ULTRASOUND-GUIDED CORE BIOPSY:  -INVASIVE DUCTAL CARCINOMA, present within angiolymphatic space.     II. CASE FROM [place], [place] ([id number], OBTAINED 11/16/21):  LEFT BREAST MASS, 12:00, 8 CM FROM NIPPLE, ULTRASOUND-GUIDED CORE BIOPSY:  -INVASIVE DUCTAL CARCINOMA, [place] grade 3, at least 2 mm in linear extent.  -Extensive angiolymphatic invasion is present.  -Invasive carcinoma is low positive for estrogen receptor (5% nuclei, weak staining) and negative for progesterone receptor; HER2 is equivocal by immunohistochemistry (score 2+); Ki-67 proliferation index is 27%.     III. CASE FROM [place], [place] ([id number], OBTAINED 11/16/21):  LYMPH NODES, LEFT AXILLARY, ULTRASOUND-GUIDED CORE NEEDLE BIOPSY:  -METASTATIC BREAST CARCINOMA to lymph node, 6.7 mm in greatest dimension.  -Tumor is negative for estrogen and progesterone receptors; per report, HER-2/prosper gene is amplified based on the combined review of the dual probe in situ hybridization and reflex HER-2 immunohistochemistry (see outside report for details).      I spent 60 minutes with the patient more than 50% of which was in counseling and coordination of care.               Again, thank you for allowing me to participate in the care of your patient.        Sincerely,        Jose Ferguson MD

## 2021-12-16 NOTE — LETTER
12/16/2021         RE: Mary Kay Deal  [address]   [place] [address]        Dear Colleague,    Thank you for referring your patient, Mary Kay Deal, to the [facility]. Please see a copy of my visit note below.    NEW SURGICAL CONSULTATION  Dec 16, 2021    Mary Kay Deal is a 61 year old woman who presents with a left  breast complaint.  She was self-referred.    HPI:    She noted left breast and axillary masses. There is some pain in the breast mass. No pain in hte axillary mass.  Occasionally has left arm and leg numbness, longstanding, not new.  No right sided symptoms.  No nipple discharge.     Mammogram showed diffuse density over the anterior aspect of the central LEFT breast. US showed a 22 mm ill-defined mass at 12:00 8 cm FN, an 8 mm mass at 10:00 6 cm FN, and hypoechogenicity retroareolarly.  There was also a 1.8 cm left axillary node.    A biopsy was performed of the 10:00 mass and an open coil clip was placed.  It showed high grade mammary carcinoma with diffuse lymphovascular invasion, similar in morphology to the 12:00 mass.    A biopsy was performed of the 12:00 mass and a butterfly clip was placed. It showed invasive ductal carcinoma, grade 3, ER low positive (5%), AR- HER2 equivocal by IHC.  A biopsy was performed of the left axillary node and a open coil clip was placed. It showed metastatic high grade mammary carcinoma, similar in morphology to the 12:00 mass, ER- AR- HER2/prosper equivocal by IHC, positive by FISH.    BREAST-SPECIFIC HISTORY:  Prior breast biopsies: No  Prior breast surgeries: Yes - left lumpectomy - benign  Prior radiation history: Yes - lung cancer    FAMILY HISTORY:  Breast ca: Yes - mat 1st cousin, pat 1st cousin (both in 60's-70's)  Ovarian ca: No  Pancreatic ca: No  Melanoma: No  Gastric ca: Yes - pat uncle  Colon ca: No  Other cancer: Yes mother had nonmelanoma skin cancer    Past Medical History:   Diagnosis Date     Alcohol dependence (H)      completed treatment in      Aspergillosis, with pneumonia (H)      Cancer (H)     questionable lung CA     Chronic infection     Known fungal lung condition     Constipation      Disorder of bone and cartilage, unspecified      Emphysema      GERD      Hx of radiation therapy     For lung mass     Mild intermittent asthma      Other dyspnea and respiratory abnormality      Other malaise and fatigue      SMOKER    Is on 4 L of oxygen at rest.   Unable to walk a block on flat ground  No MI, CVA, DM    Past Surgical History:   Procedure Laterality Date     BREAST SURGERY      Tumor removal at the age of 15     COLONOSCOPY N/A 2014    Procedure: COLONOSCOPY;  Surgeon: Patricia Perry MD;  Location:  OR     COLONOSCOPY N/A 2015    Procedure: COMBINED COLONOSCOPY, SINGLE OR MULTIPLE BIOPSY/POLYPECTOMY BY BIOPSY;  Surgeon: Patricia Perry MD;  Location:  GI     ENT SURGERY      Tonsillectomy     GYN SURGERY       x 1     SURGICAL HISTORY OF -       s/p Breast tumor @ age 15 - benign     SURGICAL HISTORY OF -       c section     SURGICAL HISTORY OF -       tonsillectomy       Current Outpatient Medications   Medication Sig Dispense Refill     albuterol (VENTOLIN HFA) 108 (90 Base) MCG/ACT inhaler Inhale 2 puffs into the lungs every 4 hours as needed for shortness of breath / dyspnea or wheezing 18 g 11     amLODIPine (NORVASC) 5 MG tablet Take 1 tablet (5 mg) by mouth daily 90 tablet 3     arformoterol (BROVANA) 15 MCG/2ML NEBU neb solution USE 1 VIAL  IN  NEBULIZER TWICE  DAILY - (Morning And Evening) 120 mL 9     azithromycin (ZITHROMAX) 250 MG tablet Take 1 tablet (250 mg) by mouth daily 30 tablet 11     budesonide (PULMICORT) 0.5 MG/2ML neb solution USE 1 VIAL  IN  NEBULIZER TWICE  DAILY - Rinse Mouth After Each Use 120 mL 11     Calcium (CALCIUM 600) 600 MG tablet Take 1 tablet by mouth 2 times daily. 1-2 tablets as tolerated       Chlorpheniramine-DM 4-20 MG TABS Take 1 tablet  by mouth every 6 hours as needed. 84 tablet 3     Cholecalciferol (VITAMIN D) 1000 UNIT capsule Take 1 capsule by mouth 2 times daily.       doxycycline hyclate (VIBRA-TABS) 100 MG tablet Take 1 tablet (100 mg) by mouth 2 times daily (Patient not taking: Reported on 12/16/2021) 20 tablet 0     famotidine (PEPCID) 20 MG tablet TAKE TWO TABLETS BY MOUTH EVERY NIGHT AT BEDTIME 60 tablet 4     fluticasone (FLONASE) 50 MCG/ACT nasal spray Spray 1 spray into both nostrils 2 times daily 16 g 11     LORazepam (ATIVAN) 0.5 MG tablet TAKE ONE TABLET BY MOUTH EVERY 8 HOURS AS NEEDED FOR ANXIETY 90 tablet 0     Misc. Devices (FINGERTIP PULSE OXIMETER) MISC To be used as needed - for SOB. 1 each 0     montelukast (SINGULAIR) 10 MG tablet Take 1 tablet (10 mg) by mouth every morning 30 tablet 11     MULTIVITAMIN TABS   OR 1 TABLET BY MOUTH DAILY       omeprazole (PRILOSEC) 20 MG DR capsule TAKE ONE CAPSULE BY MOUTH in the the morning. 180 capsule 3     order for DME Equipment being ordered: Oxygen --Please provide oxygen at 4 LPM via nasal canula with exertion and with sleep. Can be on room air at rest. Please provide portability. Keep oxygen saturations above 90%. 1 Device 1     ORDER FOR DME O2 constant   4 LPM       predniSONE (DELTASONE) 10 MG tablet 40 mg x 3 days, 30 mg x 3 days, 20 mg x 3 days, 10 mg x 3 days (Patient not taking: Reported on 12/16/2021) 30 tablet 0     predniSONE (DELTASONE) 10 MG tablet TAKE FOUR TABLETS BY MOUTH ONCE DAILY FOR 2 DAYS, THEN THREE TABLETS BY ONCE DAILY FOR 2 DAYS, THEN TWO TABLETS BY MOUTH ONCE DAILY FOR 3 DAYS, THEN ONE TABLET BY MOUTH ONCE DAILY FOR 3 DAYS 23 tablet 1     Respiratory Therapy Supplies (NEBULIZER COMPRESSOR) KIT 1 Device 4 times daily 1 kit 1     SENNA LAXATIVE OR 2 TABLET BY MOUTH DAILY AS NEEDED FOR CONSTIPATION       sertraline (ZOLOFT) 50 MG tablet TAKE ONE AND ONE-HALF TABLET BY MOUTH EVERY  tablet 3     simvastatin (ZOCOR) 20 MG tablet TAKE ONE TABLET BY MOUTH  EVERY NIGHT AT BEDTIME 90 tablet 3     tiotropium (SPIRIVA HANDIHALER) 18 MCG inhaled capsule USING THE HANDIHALER, INHALE THE CONTENTS OF ONE CAPSULE BY MOUTH DAILY 90 capsule 3     triamcinolone (KENALOG) 0.1 % external cream Apply topically 2 times daily to affected area as directed. 45 g 6   Not currently on Prednisone; takes it for a few days every couple of months       Allergies   Allergen Reactions     Pcn [Penicillins] Hives        ROS:  Easy bruising/bleeding: Yes - bruises easily    LMP 09/08/2004    Physical Exam  Constitutional:       Appearance: She is well-developed.   Pulmonary:      Comments: She is on 4 L of oxygen at baseline.  Chest:   Breasts: Breasts are symmetrical.      Right: No inverted nipple, mass, nipple discharge, skin change, tenderness, axillary adenopathy or supraclavicular adenopathy.      Left: Mass (Diffuse firmness throughout central/anterior LEFT breast. At 6:00 behind the areola, there is a superficial firm rounded mass in addition to the diffuse firmness.) and axillary adenopathy (Mobile 2 cm node high in the axilla) present. No inverted nipple, nipple discharge, skin change, tenderness or supraclavicular adenopathy.        Comments: Patient was examined in both supine and upright positions.   Lymphadenopathy:      Cervical: No cervical adenopathy.      Right cervical: No superficial, deep or posterior cervical adenopathy.     Left cervical: No superficial, deep or posterior cervical adenopathy.      Upper Body:      Right upper body: No supraclavicular, axillary or pectoral adenopathy.      Left upper body: Axillary adenopathy (Mobile 2 cm node high in the axilla) present. No supraclavicular or pectoral adenopathy.      Comments: No lymphedema in bilateral upper extremities.   Skin:     General: Skin is warm and dry.          INVESTIGATIONS:    PET/CT (12/7/2021) showed:  Impression:   1. Ill-defined nodularity in the left breast shows mild hypermetabolic activity and  presumably corresponds with the patient's   known biopsy-proven malignancy. MRI of the breasts could be obtained for more accurate local evaluation of disease within the left breast, if clinically indicated.   2. There are two enlarged hypermetabolic left axillary lymph nodes as detailed above compatible with metastases.   3. No evidence for FDG-avid metastatic disease elsewhere.     Diagnostic Mammogram & Ultrasound from [facility] (11/16/2021) showed:  BREAST DENSITY: Scattered areas of fibroglandular density.   FINDINGS: A diagnostic bilateral mammogram was performed utilizing  tomosynthesis.   RIGHT BREAST: There is no evidence for spiculated masses, architectural distortion, asymmetry or suspicious calcifications.   LEFT BREAST: There is diffuse increased density through the anterior aspect of the central and medial left breast with overlying skin thickening. This would be concerning for inflammatory breast cancer. Ultrasound will be obtained.   LEFT BREAST ULTRASOUND: In the left breast 12:00 position approximately 8 cm from the nipple there is an irregular hypoechoic ill-defined area measuring 20 x 22 x 17 mm. Tissue diagnosis is recommended. Additionally in the left breast 10:00 position approximately 6 cm from the nipple there is another hypoechoic area that is irregular and ill-defined and measures 8 x 7 x 4 mm. Tissue diagnosis is recommended. Diffuse skin thickening is seen over the anterior left breast measuring up to 5 mm. There is also an ill-defined hypoechogenicity behind the nipple. In the left axilla there is an 18 x 16 x 12 mm lymph node without significant fatty hilum. This appears suspicious and tissue diagnosis is recommended.   IMPRESSION:   1. Findings concerning for inflammatory breast cancer involving the anterior left breast particularly the central and medial aspect.   2. Recommend core biopsy for 2 suspicious areas seen on ultrasound in the 12:00 and 10:00 region.   3. Diffuse skin  thickening anteriorly in the left breast.   4. Suspicious left axillary lymph node. Recommend biopsy of this as well.   5. The right breast is unremarkable.   Recommendation: Core biopsy x2 of the left breast and x1 of the left axilla. The patient was provided with the results and recommendations at the completion of the examination.   When performed, computer-aided detection was used in the interpretation of this study.   ACR 5: Highly Suggestive of Malignancy.     Biopsy from [facility] (11/16/2021) showed:  Final Diagnosis     Lymph node, left axillary, [id number] mm, lesion 3C, ultrasound-guided core needle biopsy:  --- POSITIVE FOR MALIGNANCY: Metastatic high grade mammary carcinoma.  --- Greatest dimension of metastatic focus: 6.7 mm.  --- The morphologic features are similar to those seen in the primary breast cancer case [id number].  ------ Ancillary studies:  --------- Estrogen receptor status: Negative (<1% nuclear staining in tumor cells).  --------- Progesterone receptor status: Negative (<1% nuclear staining in tumor cells).  --------- Her2 status: Equivocal (2+, 5% with complete strong membranous staining)  --------- Reflex Her2 FISH: Pending, results as an addendum.     Addendum     This addendum is created to report the results of PD-L1 22C3 FDA (Keytruda) immunohistochemistry performed at [facility] although interpreted at [facility] for triple negative breast cancer.  The results are as follows:     --- PD-L1 22C3 FDA (Keytruda):    ------- Combined positive score: PD-L1 EXPRESSED  (>/=10%).     Comment: PD-L1 22C3 FDA (Keytruda) for TNBC is a  diagnostic for certain triple-negative breast cancer patients. PD-L1 expression >10% CPS may be associated with increased progression-free survival in patients with metastatic or locally advanced and unresectable TNBC treated with Keytruda and chemotherapy.     This PD-L1 immunostain was seen in consultation by Dr. REGULO ENRIQUEZ  Dario.      Addendum electronically signed by Alesia Silverio DO on 11/29/2021 at  4:03 PM   Addendum 2     This addendum is issued to report the results of Her2 FISH performed and interpreted at [facility].   Results are as follows:     --- Her2 by FISH:  POSITIVE with reflex testing (see comment).  --- Initial probe result:  ----- Her2:D17Z1 ratio = 1.31  ----- Average Her2 signals per cell = 12.0  ----- Average D17Z1 signals per cell = 9.2  --- Final IHC-guided probe result:  ----- Her2:D17Z1 ratio = 1.21  ----- Average Her2 signals per cell = 11.4  ----- Average D17Z1 signals per cell = 9.4     --- HER2 by Immunohistochemistry (repeated at [facility]):  ------ Equivocal (score 2+), 0% with intense complete membranous staining.        Biopsy from [facility] (11/16/2021) showed:  Final Diagnosis     Left breast mass, 8x7x4 mm mass, 10 o  clock, 6 cm from the nipple, open coil clip, ultrasound-guided core biopsy:  --- POSITIVE FOR MALIGNANCY:  High grade mammary carcinoma with diffuse lymphovascular invasion.  --- The morphology of the tumor is similar to that seen in [id number]  --- No second primary breast malignancy seen within this biopsy.       Biopsy from [facility] (11/16/2021) showed:  Final Diagnosis     Left breast mass, [id number] mm mass, 12 o  clock, 8 cm from the nipple, butterfly clip, lesion 3B, ultrasound-guided core biopsy:  --- POSITIVE FOR MALIGNANCY:  Invasive high grade ductal carcinoma, characterized by:  ------ Bedford thgthrthathdtheth:th th4th of 3, Bedford score: 8 of 9.  ------ Greatest linear length of invasive carcinoma: 2 mm.  ------ Associated ductal carcinoma in situ: Absent.  ------ Diffuse lymphovascular invasion is present.     ------ Ancillary studies:  --------- Estrogen receptor status: Low positive (Weak nuclear staining in 5% of tumor cells).  --------- Progesterone receptor status: Negative (<1% nuclear staining in tumor cells).  --------- Her2 status: Equivocal (2+, 0%  with complete strong membranous staining)  --------- Reflex Her2 FISH: Performed on the lymph node biopsy.  --------- Ki-67 proliferation index: 27.2% (136 of 500).     ASSESSMENT:  Mary Kay Deal is a 61 year old woman with HER2/prosper amplified locally advanced LEFT breast cancer.    I personally reviewed the imaging above. Mary Kay Deal attended the appointment with her daughter and . I reviewed the breast imaging and PET/CT with them.    I discussed that typically for breast cancer, surgery, radiation, and systemic therapy are employed.I am very concerned about her ability to tolerate surgery. We discussed that mastectomy (or a very large central segmental mastectomy) and axillary lymph node dissection are currently the only surgery option that would be recommended.  Technically, this cancer is resectable and these procedures can be performed.  However, these surgeries would involve a general anesthesia for several hours and I have concerns about her ability to tolerate this and be able to be extubated following surgery. She has very poor exercise tolerance and requires quite a lot of oxygen at rest.    She has previously undergone SBRT 5000cGy for a NSCLC of the LEFT upper lobe, completed in 2013; if we did proceed with surgery, a radiation oncology consultation would be indicated to determine if she is a candidate for left chest wall/axillary radiation.  We discussed that adjuvant radiation is typically recommended for lymph node positive breast cancer.    We discussed that some HER2/prosper amplified breast cancers respond well to targeted therapy.  I agree with Dr Ferguson's plans for upfront systemic therapy and then proceed from there.    All of the above was discussed with Mary Kay Deal and all questions were answered.  She elected to proceed with systemic therapy with Dr Ferguson.  I will see her for follow up depending on her response to treatment.  I personally discussed this plan with Dr Ferguson today.      Total time spent with the patient was 30 minutes, of which 75% was counseling.     PLAN:  1. Neoadjuvant therapy with Dr Ferguson  2. Not a clear surgical candidate  3. Follow up with me to re-discuss surgery depending on response to systemic therapy    Madonna Hernandez MD MSc [facility] FACS  Assistant Professor of Surgery  Division of Surgical Oncology  [facility]     40 minutes spent on the date of the encounter doing chart review, review of test results, interpretation of tests, patient visit, documentation and discussion with other provider(s).      Again, thank you for allowing me to participate in the care of your patient.        Sincerely,        Madonna Hernandez MD

## 2021-12-16 NOTE — PROGRESS NOTES
NEW SURGICAL CONSULTATION  Dec 16, 2021    Mary Kay Deal is a 61 year old woman who presents with a left  breast complaint.  She was self-referred.    HPI:    She noted left breast and axillary masses. There is some pain in the breast mass. No pain in hte axillary mass.  Occasionally has left arm and leg numbness, longstanding, not new.  No right sided symptoms.  No nipple discharge.     Mammogram showed diffuse density over the anterior aspect of the central LEFT breast. US showed a 22 mm ill-defined mass at 12:00 8 cm FN, an 8 mm mass at 10:00 6 cm FN, and hypoechogenicity retroareolarly.  There was also a 1.8 cm left axillary node.    A biopsy was performed of the 10:00 mass and an open coil clip was placed.  It showed high grade mammary carcinoma with diffuse lymphovascular invasion, similar in morphology to the 12:00 mass.    A biopsy was performed of the 12:00 mass and a butterfly clip was placed. It showed invasive ductal carcinoma, grade 3, ER low positive (5%), SD- HER2 equivocal by IHC.  A biopsy was performed of the left axillary node and a open coil clip was placed. It showed metastatic high grade mammary carcinoma, similar in morphology to the 12:00 mass, ER- SD- HER2/prosper equivocal by IHC, positive by FISH.    BREAST-SPECIFIC HISTORY:  Prior breast biopsies: No  Prior breast surgeries: Yes - left lumpectomy - benign  Prior radiation history: Yes - lung cancer    FAMILY HISTORY:  Breast ca: Yes - mat 1st cousin, pat 1st cousin (both in 60's-70's)  Ovarian ca: No  Pancreatic ca: No  Melanoma: No  Gastric ca: Yes - pat uncle  Colon ca: No  Other cancer: Yes mother had nonmelanoma skin cancer    Past Medical History:   Diagnosis Date     Alcohol dependence (H)     completed treatment in 1986     Aspergillosis, with pneumonia (H)      Cancer (H)     questionable lung CA     Chronic infection     Known fungal lung condition     Constipation      Disorder of bone and cartilage, unspecified      Emphysema       GERD      Hx of radiation therapy     For lung mass     Mild intermittent asthma      Other dyspnea and respiratory abnormality      Other malaise and fatigue      SMOKER    Is on 4 L of oxygen at rest.   Unable to walk a block on flat ground  No MI, CVA, DM    Past Surgical History:   Procedure Laterality Date     BREAST SURGERY      Tumor removal at the age of 15     COLONOSCOPY N/A 2014    Procedure: COLONOSCOPY;  Surgeon: Patricia Perry MD;  Location:  OR     COLONOSCOPY N/A 2015    Procedure: COMBINED COLONOSCOPY, SINGLE OR MULTIPLE BIOPSY/POLYPECTOMY BY BIOPSY;  Surgeon: Patricia Perry MD;  Location:  GI     ENT SURGERY      Tonsillectomy     GYN SURGERY       x 1     SURGICAL HISTORY OF -       s/p Breast tumor @ age 15 - benign     SURGICAL HISTORY OF -       c section     SURGICAL HISTORY OF -       tonsillectomy       Current Outpatient Medications   Medication Sig Dispense Refill     albuterol (VENTOLIN HFA) 108 (90 Base) MCG/ACT inhaler Inhale 2 puffs into the lungs every 4 hours as needed for shortness of breath / dyspnea or wheezing 18 g 11     amLODIPine (NORVASC) 5 MG tablet Take 1 tablet (5 mg) by mouth daily 90 tablet 3     arformoterol (BROVANA) 15 MCG/2ML NEBU neb solution USE 1 VIAL  IN  NEBULIZER TWICE  DAILY - (Morning And Evening) 120 mL 9     azithromycin (ZITHROMAX) 250 MG tablet Take 1 tablet (250 mg) by mouth daily 30 tablet 11     budesonide (PULMICORT) 0.5 MG/2ML neb solution USE 1 VIAL  IN  NEBULIZER TWICE  DAILY - Rinse Mouth After Each Use 120 mL 11     Calcium (CALCIUM 600) 600 MG tablet Take 1 tablet by mouth 2 times daily. 1-2 tablets as tolerated       Chlorpheniramine-DM 4-20 MG TABS Take 1 tablet by mouth every 6 hours as needed. 84 tablet 3     Cholecalciferol (VITAMIN D) 1000 UNIT capsule Take 1 capsule by mouth 2 times daily.       doxycycline hyclate (VIBRA-TABS) 100 MG tablet Take 1 tablet (100 mg) by mouth 2 times daily (Patient not  taking: Reported on 12/16/2021) 20 tablet 0     famotidine (PEPCID) 20 MG tablet TAKE TWO TABLETS BY MOUTH EVERY NIGHT AT BEDTIME 60 tablet 4     fluticasone (FLONASE) 50 MCG/ACT nasal spray Spray 1 spray into both nostrils 2 times daily 16 g 11     LORazepam (ATIVAN) 0.5 MG tablet TAKE ONE TABLET BY MOUTH EVERY 8 HOURS AS NEEDED FOR ANXIETY 90 tablet 0     Misc. Devices (FINGERTIP PULSE OXIMETER) MISC To be used as needed - for SOB. 1 each 0     montelukast (SINGULAIR) 10 MG tablet Take 1 tablet (10 mg) by mouth every morning 30 tablet 11     MULTIVITAMIN TABS   OR 1 TABLET BY MOUTH DAILY       omeprazole (PRILOSEC) 20 MG DR capsule TAKE ONE CAPSULE BY MOUTH in the the morning. 180 capsule 3     order for DME Equipment being ordered: Oxygen --Please provide oxygen at 4 LPM via nasal canula with exertion and with sleep. Can be on room air at rest. Please provide portability. Keep oxygen saturations above 90%. 1 Device 1     ORDER FOR DME O2 constant   4 LPM       predniSONE (DELTASONE) 10 MG tablet 40 mg x 3 days, 30 mg x 3 days, 20 mg x 3 days, 10 mg x 3 days (Patient not taking: Reported on 12/16/2021) 30 tablet 0     predniSONE (DELTASONE) 10 MG tablet TAKE FOUR TABLETS BY MOUTH ONCE DAILY FOR 2 DAYS, THEN THREE TABLETS BY ONCE DAILY FOR 2 DAYS, THEN TWO TABLETS BY MOUTH ONCE DAILY FOR 3 DAYS, THEN ONE TABLET BY MOUTH ONCE DAILY FOR 3 DAYS 23 tablet 1     Respiratory Therapy Supplies (NEBULIZER COMPRESSOR) KIT 1 Device 4 times daily 1 kit 1     SENNA LAXATIVE OR 2 TABLET BY MOUTH DAILY AS NEEDED FOR CONSTIPATION       sertraline (ZOLOFT) 50 MG tablet TAKE ONE AND ONE-HALF TABLET BY MOUTH EVERY  tablet 3     simvastatin (ZOCOR) 20 MG tablet TAKE ONE TABLET BY MOUTH EVERY NIGHT AT BEDTIME 90 tablet 3     tiotropium (SPIRIVA HANDIHALER) 18 MCG inhaled capsule USING THE HANDIHALER, INHALE THE CONTENTS OF ONE CAPSULE BY MOUTH DAILY 90 capsule 3     triamcinolone (KENALOG) 0.1 % external cream Apply topically 2  times daily to affected area as directed. 45 g 6   Not currently on Prednisone; takes it for a few days every couple of months       Allergies   Allergen Reactions     Pcn [Penicillins] Hives        ROS:  Easy bruising/bleeding: Yes - bruises easily    LMP 09/08/2004    Physical Exam  Constitutional:       Appearance: She is well-developed.   Pulmonary:      Comments: She is on 4 L of oxygen at baseline.  Chest:   Breasts: Breasts are symmetrical.      Right: No inverted nipple, mass, nipple discharge, skin change, tenderness, axillary adenopathy or supraclavicular adenopathy.      Left: Mass (Diffuse firmness throughout central/anterior LEFT breast. At 6:00 behind the areola, there is a superficial firm rounded mass in addition to the diffuse firmness.) and axillary adenopathy (Mobile 2 cm node high in the axilla) present. No inverted nipple, nipple discharge, skin change, tenderness or supraclavicular adenopathy.        Comments: Patient was examined in both supine and upright positions.   Lymphadenopathy:      Cervical: No cervical adenopathy.      Right cervical: No superficial, deep or posterior cervical adenopathy.     Left cervical: No superficial, deep or posterior cervical adenopathy.      Upper Body:      Right upper body: No supraclavicular, axillary or pectoral adenopathy.      Left upper body: Axillary adenopathy (Mobile 2 cm node high in the axilla) present. No supraclavicular or pectoral adenopathy.      Comments: No lymphedema in bilateral upper extremities.   Skin:     General: Skin is warm and dry.          INVESTIGATIONS:    PET/CT (12/7/2021) showed:  Impression:   1. Ill-defined nodularity in the left breast shows mild hypermetabolic activity and presumably corresponds with the patient's   known biopsy-proven malignancy. MRI of the breasts could be obtained for more accurate local evaluation of disease within the left breast, if clinically indicated.   2. There are two enlarged hypermetabolic  left axillary lymph nodes as detailed above compatible with metastases.   3. No evidence for FDG-avid metastatic disease elsewhere.     Diagnostic Mammogram & Ultrasound from [facility] (11/16/2021) showed:  BREAST DENSITY: Scattered areas of fibroglandular density.   FINDINGS: A diagnostic bilateral mammogram was performed utilizing  tomosynthesis.   RIGHT BREAST: There is no evidence for spiculated masses, architectural distortion, asymmetry or suspicious calcifications.   LEFT BREAST: There is diffuse increased density through the anterior aspect of the central and medial left breast with overlying skin thickening. This would be concerning for inflammatory breast cancer. Ultrasound will be obtained.   LEFT BREAST ULTRASOUND: In the left breast 12:00 position approximately 8 cm from the nipple there is an irregular hypoechoic ill-defined area measuring 20 x 22 x 17 mm. Tissue diagnosis is recommended. Additionally in the left breast 10:00 position approximately 6 cm from the nipple there is another hypoechoic area that is irregular and ill-defined and measures 8 x 7 x 4 mm. Tissue diagnosis is recommended. Diffuse skin thickening is seen over the anterior left breast measuring up to 5 mm. There is also an ill-defined hypoechogenicity behind the nipple. In the left axilla there is an 18 x 16 x 12 mm lymph node without significant fatty hilum. This appears suspicious and tissue diagnosis is recommended.   IMPRESSION:   1. Findings concerning for inflammatory breast cancer involving the anterior left breast particularly the central and medial aspect.   2. Recommend core biopsy for 2 suspicious areas seen on ultrasound in the 12:00 and 10:00 region.   3. Diffuse skin thickening anteriorly in the left breast.   4. Suspicious left axillary lymph node. Recommend biopsy of this as well.   5. The right breast is unremarkable.   Recommendation: Core biopsy x2 of the left breast and x1 of the left axilla. The patient was  provided with the results and recommendations at the completion of the examination.   When performed, computer-aided detection was used in the interpretation of this study.   ACR 5: Highly Suggestive of Malignancy.     Biopsy from [facility] (11/16/2021) showed:  Final Diagnosis     Lymph node, left axillary, 28x32p21 mm, lesion 3C, ultrasound-guided core needle biopsy:  --- POSITIVE FOR MALIGNANCY: Metastatic high grade mammary carcinoma.  --- Greatest dimension of metastatic focus: 6.7 mm.  --- The morphologic features are similar to those seen in the primary breast cancer case [id number].  ------ Ancillary studies:  --------- Estrogen receptor status: Negative (<1% nuclear staining in tumor cells).  --------- Progesterone receptor status: Negative (<1% nuclear staining in tumor cells).  --------- Her2 status: Equivocal (2+, 5% with complete strong membranous staining)  --------- Reflex Her2 FISH: Pending, results as an addendum.     Addendum     This addendum is created to report the results of PD-L1 22C3 FDA (Keytruda) immunohistochemistry performed at [facility] although interpreted at [facility] for triple negative breast cancer.  The results are as follows:     --- PD-L1 22C3 FDA (Keytruda):    ------- Combined positive score: PD-L1 EXPRESSED  (>/=10%).     Comment: PD-L1 22C3 FDA (Keytruda) for TNBC is a  diagnostic for certain triple-negative breast cancer patients. PD-L1 expression >10% CPS may be associated with increased progression-free survival in patients with metastatic or locally advanced and unresectable TNBC treated with Keytruda and chemotherapy.     This PD-L1 immunostain was seen in consultation by Dr. REGULO Bishop.      Addendum electronically signed by Alesia Silverio DO on 11/29/2021 at  4:03 PM   Addendum 2     This addendum is issued to report the results of Her2 FISH performed and interpreted at [facility].   Results are as follows:     --- Her2  by FISH:  POSITIVE with reflex testing (see comment).  --- Initial probe result:  ----- Her2:D17Z1 ratio = 1.31  ----- Average Her2 signals per cell = 12.0  ----- Average D17Z1 signals per cell = 9.2  --- Final IHC-guided probe result:  ----- Her2:D17Z1 ratio = 1.21  ----- Average Her2 signals per cell = 11.4  ----- Average D17Z1 signals per cell = 9.4     --- HER2 by Immunohistochemistry (repeated at [facility]):  ------ Equivocal (score 2+), 0% with intense complete membranous staining.        Biopsy from [facility] (11/16/2021) showed:  Final Diagnosis     Left breast mass, 8x7x4 mm mass, 10 o  clock, 6 cm from the nipple, open coil clip, ultrasound-guided core biopsy:  --- POSITIVE FOR MALIGNANCY:  High grade mammary carcinoma with diffuse lymphovascular invasion.  --- The morphology of the tumor is similar to that seen in [id number]  --- No second primary breast malignancy seen within this biopsy.       Biopsy from [facility] (11/16/2021) showed:  Final Diagnosis     Left breast mass, 65l99s80 mm mass, 12 o  clock, 8 cm from the nipple, butterfly clip, lesion 3B, ultrasound-guided core biopsy:  --- POSITIVE FOR MALIGNANCY:  Invasive high grade ductal carcinoma, characterized by:  ------ Shanique thgthrthathdtheth:th th4th of 3, Shanique score: 8 of 9.  ------ Greatest linear length of invasive carcinoma: 2 mm.  ------ Associated ductal carcinoma in situ: Absent.  ------ Diffuse lymphovascular invasion is present.     ------ Ancillary studies:  --------- Estrogen receptor status: Low positive (Weak nuclear staining in 5% of tumor cells).  --------- Progesterone receptor status: Negative (<1% nuclear staining in tumor cells).  --------- Her2 status: Equivocal (2+, 0% with complete strong membranous staining)  --------- Reflex Her2 FISH: Performed on the lymph node biopsy.  --------- Ki-67 proliferation index: 27.2% (136 of 500).     ASSESSMENT:  Mary Kay Thompsonn is a 61 year old woman with HER2/prosper amplified locally advanced LEFT  breast cancer.    I personally reviewed the imaging above. Mary Kay Deal attended the appointment with her daughter and . I reviewed the breast imaging and PET/CT with them.    I discussed that typically for breast cancer, surgery, radiation, and systemic therapy are employed.I am very concerned about her ability to tolerate surgery. We discussed that mastectomy (or a very large central segmental mastectomy) and axillary lymph node dissection are currently the only surgery option that would be recommended.  Technically, this cancer is resectable and these procedures can be performed.  However, these surgeries would involve a general anesthesia for several hours and I have concerns about her ability to tolerate this and be able to be extubated following surgery. She has very poor exercise tolerance and requires quite a lot of oxygen at rest.    She has previously undergone SBRT 5000cGy for a NSCLC of the LEFT upper lobe, completed in 2013; if we did proceed with surgery, a radiation oncology consultation would be indicated to determine if she is a candidate for left chest wall/axillary radiation.  We discussed that adjuvant radiation is typically recommended for lymph node positive breast cancer.    We discussed that some HER2/prosper amplified breast cancers respond well to targeted therapy.  I agree with Dr Ferguson's plans for upfront systemic therapy and then proceed from there.    All of the above was discussed with Mary Kay Deal and all questions were answered.  She elected to proceed with systemic therapy with Dr Ferguson.  I will see her for follow up depending on her response to treatment.  I personally discussed this plan with Dr Ferguson today.     Total time spent with the patient was 30 minutes, of which 75% was counseling.     PLAN:  1. Neoadjuvant therapy with Dr Ferguson  2. Not a clear surgical candidate  3. Follow up with me to re-discuss surgery depending on response to systemic therapy    Madonna Hernandez MD  MSc KERWINC FACS  Assistant Professor of Surgery  Division of Surgical Oncology  [facility]     40 minutes spent on the date of the encounter doing chart review, review of test results, interpretation of tests, patient visit, documentation and discussion with other provider(s).

## 2021-12-16 NOTE — NURSING NOTE
EKG was performed today.  Order written by Dr Ferguson was completed today. Name and  verified with patient.  Patient was discharged after EKG.    Mary Kay Licea CMA (AAMA)

## 2021-12-16 NOTE — PROGRESS NOTES
MEDICAL ONCOLOGY CONSULT    Miah Elder MD  [facility]  [address] [place] [address]    RE:  Mary Kay Deal  MRN:  [id number]  :  1960    Dear Dr. Elder:    Thank you for referring Mary Kay Deal to our clinic for a second opinion regarding a newly diagnosed left breast estrogen receptor negative, progesterone receptor negative and HER2 positive by FISH, invasive ductal carcinoma of the lower inner quadrant of the left breast with axillary lymph node involvement.  Clinical stage T2 N1 MX.    Mary Kay was in her usual state of health with severe COPD, oxygen dependent on 4 liters of oxygen.  She has been oxygen dependent for at least 5 years.  Additionally, in  she underwent SBRT treatment for presumptive left upper lobe non-small cell lung cancer, clinical T1c N0 M0.  She did stop smoking approximately 10 years ago.  She saw Dr. Albert for the problem for the left breast mass, which she noted in late 10/2021.  On 2021, she underwent a mammogram, which showed diffuse density over the anterior aspect of the central left breast.  Ultrasound showed a 22 mm ill-defined mass at 12 o'clock, 8 cm from the nipple, and an 8 mm mass at the 10 o'clock position, 6 cm from the nipple and hypoechogenicity retroareolarly.  There was a 1.8 cm left axillary lymph node.    Biopsy was performed on the 10 o'clock mass and an open coil clip was placed that showed high grade mammary carcinoma with diffuse lymphovascular invasion similar in morphology with a 12 o'clock mass.  A biopsy was performed at the 12 o'clock mass and a butterfly clip was placed.  It showed invasive ductal carcinoma, grade 3, ER low positive at 5%, MA negative and HER2 equivocal by IHC.  A biopsy was performed of the left axillary lymph node and an open coil clip was placed.  The biopsy showed metastatic high grade mammary carcinoma similar in morphology to the 12 o'clock mass, ER negative, MA negative and  HER2 equivocal by IHC and positive by FISH.    She now comes to clinic with her , Brad and daughter, Marlene, for recommendations.    PAST MEDICAL HISTORY:  She has no history of prior breast biopsies but does have a history of a left lumpectomy performed when she was a teenager.  The results of the lumpectomy are reported as benign.  She does have a history of SBRT radiation to the left lung in 2013 for a presumptive NSCLC.    FAMILY HISTORY:  Positive for breast cancer in a maternal first cousin and paternal first cousin both in their 60s or 70s.  Family history is negative for ovarian cancer, pancreatic cancer, melanoma.  She does have a paternal uncle with a history of gastric cancer.  No history of colon cancer in the family.  Her mother had a nonmelanoma skin cancer on her nose.  Also, notably, the patient's brother had enucleation for an ocular tumor which occurred in childhood, but he currently has had no evidence of further cancer.    PAST MEDICAL HISTORY:  She is on 4 liters of oxygen at rest.  She is unable to walk a block.  She has no history of heart attack, cerebrovascular accident or diabetes.  She does have severe tobacco-related COPD.  She has no history of seizures, arthritis, peptic ulcer disease but does have a history of osteoporosis and bone fractures.    REVIEW OF SYSTEMS:  She has pain in the left breast, moderate fatigue, moderate depression, moderate anxiety.  She is on sertraline for depression, which is helping.  She has some weight loss.  She is eating less.  She has some loss of energy.  She does not sleep during the day, but overall has an ECOG 2 performance status.    She denies fevers or headaches.  She does have a cough.  She denies chest pain, shortness of breath at rest on oxygen but does have severe dyspnea on exertion.  No history of hemoptysis.  She has loss of appetite.  She denies nausea or vomiting or abdominal pain.  She has had chronic constipation.  She denies  diarrhea.  She denies bone pain but has chronic low back pain.  She has some numbness and tingling in the hands and feet and has some numbness and tingling in the left leg.    ALLERGIES:  No allergy to seafood, iodine, or contrast dye.  She does not take aspirin.    PAST MENSTRUAL HISTORY:  Menarche was age 13 or 14.  She has been pregnant 3 times with 3 live births.  Age at first pregnancy 21.  Age at last pregnancy, 26.  Her last period was in her late 30s and occurred naturally.  Her uterus and ovaries are in place.    No history of hormone replacement therapy.    She has approximately a 70-pack-year history of smoking, starting at age 14 and continuing for 34 years, 1-2 packs per day.    She has a history of alcoholism but has been sober for 36 years and I congratulated her on that.    No history of diabetes.    PHYSICAL EXAMINATION:    VITAL SIGNS:  /69   Pulse 117   Temp 98  F (36.7  C) (Oral)   Wt 72.2 kg (159 lb 3.2 oz)   LMP 09/08/2004   SpO2 97%   BMI 26.49 kg/m    GENERAL:  Mary Kay appeared somewhat unwell.  HEENT:  No lesions in the oropharynx.  LYMPH:  There is no palpable cervical, supraclavicular, subclavicular or right axillary lymphadenopathy.  There is some slight fullness in the left axilla, but no discrete lymph nodes could be palpated  BREAST:  Right breast is without masses. In the left breast, there was a 2 cm mass at the 6:30 position 1 fingerbreadth from the nipple areolar complex and a 2 cm mass at the 9:30 position, 2 cm from the nipple-areolar complex.  LUNGS:  Remarkable for diffuse wheezes.  HEART:  There is a tachycardic rate and rhythm.  ABDOMEN:  Soft and nontender and somewhat distended.  EXTREMITIES:  Without edema.  PSYCH:  Mood and affect normal.    NEUROLOGIC:  Motor exam showed 5/5 strength in the upper and lower extremities bilaterally.  Deep tendon reflexes were absent.       LABORATORY:  Pending.    IMAGING:  Echo 12-8-21 TT [facility]   Summary:     * There  is mild concentric left ventricular hypertrophy.     * The estimated ejection fraction is 50-55%.     * There is trace mitral regurgitation.     * There is moderate tricuspid regurgitation.     * Moderate pulmonary hypertension, estimated pulmonary arterial systolic   pressure is  51 mmHg.     * No prior study available for comparison.     PET CT [facility] 12-07-21  Impression:   1. Ill-defined nodularity in the left breast shows mild   hypermetabolic activity and presumably corresponds with the patient's   known biopsy-proven malignancy. MRI of the breasts could be obtained   for more accurate local evaluation of disease within the left breast,   if clinically indicated.     2. There are two enlarged hypermetabolic left axillary lymph nodes as   detailed above compatible with metastases.     3. No evidence for FDG-avid metastatic disease elsewhere.     Colonoscopy performed on 11-02-21  Impression:          - Diverticulosis in the sigmoid colon.                        - Two 2 to 3 mm polyps in the descending colon and at                        the hepatic flexure, removed with a hot snare. Resected                        and retrieved.                        - The examined portion of the ileum was normal.                        - Small internal hemorrhoids   Recommendation:      - Discharge patient to home.                        - High fiber diet. Consider fiber supplement e.g.                        generic Benefiber                        - Continue present medications.   A. Colon, hepatic flexure polyp, polypectomy:  --- Sessile serrated adenoma.  --- No evidence of high grade dysplasia or invasive malignancy.     B. Colon, transverse polyp, polypectomy:  --- Tubular adenoma (2 fragments).  --- Portion of sessile serrated adenoma (1 fragment).   --- No evidence of high grade dysplasia or invasive malignancy.    ASSESSMENT AND PLAN:    1.  Mary Kay Deal is a 61-year-old woman with poor performance status  with oxygen-dependent COPD, on 4 liters of oxygen at baseline, who presented with a clinical T2 multifocal  N1 M0, invasive ductal carcinoma of the left breast, which is low ER positive at 5%, AZ negative and HER2 amplified.  Notably, Mary Kay's staging PET/CT scan shows no evidence of distant metastatic disease. Mary Kay comes to clinic for discussion of treatment recommendations.  I discussed with Mary Kay that I do not recommend chemotherapy.  Given her oxygen-dependent COPD, she is not a good candidate for paclitaxel, and while Abraxane could be considered, a reasonable approach would be to the NeoSphere arm C approach of Dr. Min and colleagues and begin with dual HER2 antibody treatment with pertuzumab and trastuzumab without a taxane.  [Pako SIMMONS, Jameel T, st al.. Efficacy and safety of neoadjuvant pertuzumab and trastuzumab in women with locally advanced, inflammatory, or early HER2-positive breast cancer (NeoSphere): a randomised multicentre, open-label, phase 2 trial. Lancet Oncol. 2012 I would obtain a baseline MRI of the left breast and then repeat the MRI of the left breast at a 3-week interval to assess response.  Regardless of whether she is a candidate for surgery or not, the response of the left breast masses to dual antibody treatment could be very helpful in terms of treatment choices and prognosis.  Some patients can do well with dual antibody therapy for a number of years.  She reports that she has had an echocardiogram performed on 12/08/2021 showing an ejection fraction of 50%-55%.  There was mild concentric left ventricular hypertrophy, trace mitral regurgitation and moderate tricuspid regurgitation and moderate pulmonary hypertension with estimated pulmonary arterial systolic pressure of 51 mm.  There was no prior study for comparison.  I did call Dr. Miah Elder and did recommend that, given her underlying cardiopulmonary disease, it would be reasonable to give dual antibody therapy without a  taxane initially and assess response.  2.  I do recommend pertuzumab/trastuzumab treatment as opposed to trastuzumab alone because it is unlikely to very active as a single agent. I discussed with Dr. Elder that dual antibody treatment is preferable even if the goal may eventually be palliative. The rate of clinical response to dual antibody treatment was 69/102 (67.6%, 57.7-76.6) while pathologic complete response (pCR) was 16.8% in NeoSphere. In contrast, single agent trastuzumab, albeit in the metastatic setting, has a response rate of 11.6% [Mary Ellen MOSER, Tracy GILES, et al. Phase II study of weekly intravenous trastuzumab (Herceptin) in patients with HER2/prosper-overexpressing metastatic breast cancer. Semin Oncol. 1999 Aug;26(4 Suppl 12):78-83].  Furthermore, it is possible that if there is a response to pertuzuamb/trastuzumab and surgery is not feasible, this combination could be continued with palliative intent and could control the locoregional HER2+ breast cancer for an extended period of time with good quality of life.   2.  Discussion of surgical candidacy with Dr. Madonna Hernandez. Dr. Hernandez is concerned that Mary Kay may not be a candidate for surgery given her underlying cardiopulmonary disease.  I am in agreement with Dr. Hernandez and we will obtain Cardiology consultation.  I discussed these issues with Mary Kay and her  and daughter.  They understand that she may not be able to go to surgery.  I did discuss this with Mary Kay and her family and she expressed a preference that if she can go to surgery she would want the surgery performed at the [facility].  I discussed that the recommended approach is to see whether we can make progress in terms of local regional disease control with trastuzumab/pertuzumab dual antibody therapy and then consult further with surgery, cardiology, and anesthesia.  I discussed that any surgery would be high risk and the goal of care at this time is disease control.    3.  Recommended baseline MRI  before starting therapy.  I do recommend use of MRI to follow the breast mass because we need to know whether or not trastuzumab/pertuzumab has activity against her breast cancer.  Imaging is essential to know whether the antibody based treatment without chemotherapy has activity. Ultrasound could also be used to follow the breast masses and lymph nodes, if she cannot tolerate the MRI.   4.  If there is no response to pertuzuamb/trastuzumab other treatments could be considered such as palliative tucatinib/capecitabine/trastuzumab.  Trastuzumb-deruxtecan may not be a good option because of risk of interstitial lung disease.   5.  Severe COPD.  She is on albuterol, arformoterol, budesonide as well as montelukast and Spiriva.  She also is on azithromycin.  For hypertension, she is on amlodipine.  6.  Cardiology consultation is recommended.  I do not recommend pseudoephedrine.    7.  Depression.  She is on sertraline.  8.  Bone health. She will continue on vitamin D.  Weight bearing exercise if possible with hand weights, stretch band as tolerated.   9.  Brain MRI not recommended at this time because there are no neurological signs or symptoms.   10.  Discussion of goals of care.  Dr. Hernandez and I discussed with Mary Kay that it is not clear that she could go to surgery and that treatment at this time is palliative. If she has a good response to pertuzumab/trastuzuamb and her pulmonary status were to be dramatically improved she could be further evaluated.  The problem with axillary LN involvement is that an axillary lymph node dissection may be recommended and that would require general anesthesia where there could be significant risk.  I discussed that the pertuzumab/trastuzumab approach could be continued palliatively as long as she has a response and she tolerates the treatment.   11.  Follow up will be with Dr. Miah Elder in [place], [place].  We would be happy to consult on an as requested basis.  Pulmonary and cardiology  consultation in [place] could be helpful.     Thank you, Dr. Elder, for referring Mary Kay Deal to our clinic and allowing us to participate in her care.    Sincerely,    Jose Ferguson M.D.   Professor   Division of Hematology, Oncology, Transplant   Department of Medicine   [employer] [facility] [employer]   [phone]     ADDENDUM1   I did discuss Mary Kay with Dr. Elder on December 15 and recommended the NeoSphere approach of dual antibodies without chemotherapy.  Cardiology and Pulmonary consult recommended in [place].     ADDENDUM2:  Pathology review.     Final Diagnosis     Lymph node, left axillary, [id number] mm, lesion 3C, ultrasound-guided core needle biopsy:  --- POSITIVE FOR MALIGNANCY: Metastatic high grade mammary carcinoma.  --- Greatest dimension of metastatic focus: 6.7 mm.  --- The morphologic features are similar to those seen in the primary breast cancer case [id number].  ------ Ancillary studies:  --------- Estrogen receptor status: Negative (<1% nuclear staining in tumor cells).  --------- Progesterone receptor status: Negative (<1% nuclear staining in tumor cells).  --------- Her2 status: Equivocal (2+, 5% with complete strong membranous staining)  --------- Reflex Her2 FISH: Pending, results as an addendum.      Electronically signed by Alesia Silverio DO on 11/18/2021 at 10:19 AM   Comment     The prognostic studies were repeated on the lymph node biopsy given the increased tumor volume. Estrogen receptor is negative in the lymph node and shows very weak positivity in the breast tumor. Therefore, this tumor is more likely to behave as an ER negative malignancy. Repeating the ER studies on resection is recommended.     This case was seen in consultation by Dr. NADEGE Stark.      Addendum     This addendum is created to report the results of PD-L1 22C3 FDA (Keytruda) immunohistochemistry performed at [facility] although interpreted at [facility] for triple  "negative breast cancer.  The results are as follows:     --- PD-L1 22C3 FDA (Keytruda):    ------- Combined positive score: PD-L1 EXPRESSED  (>/=10%).     Comment: PD-L1 22C3 FDA (Keytruda) for TNBC is a  diagnostic for certain triple-negative breast cancer patients. PD-L1 expression >10% CPS may be associated with increased progression-free survival in patients with metastatic or locally advanced and unresectable TNBC treated with Keytruda and chemotherapy.     This PD-L1 immunostain was seen in consultation by Dr. REGULO Bishop.      Addendum electronically signed by Alesia Silverio DO on 11/29/2021 at  4:03 PM   Addendum 2     This addendum is issued to report the results of Her2 FISH performed and interpreted at [facility].   Results are as follows:     --- Her2 by FISH:  POSITIVE with reflex testing (see comment).  --- Initial probe result:  ----- Her2:D17Z1 ratio = 1.31  ----- Average Her2 signals per cell = 12.0  ----- Average D17Z1 signals per cell = 9.2  --- Final IHC-guided probe result:  ----- Her2:D17Z1 ratio = 1.21  ----- Average Her2 signals per cell = 11.4  ----- Average D17Z1 signals per cell = 9.4     --- HER2 by Immunohistochemistry (repeated at [facility]):  ------ Equivocal (score 2+), 0% with intense complete membranous staining.     Comment: The tumor sample is HER2 POSITIVE based on the combined review of the dual-probe in situ hybridization (CANDI) and reflex HER2 immunohistochemistry results. According to the current ASCO/CAP guidelines, these results \"Group 3\" require additional work up by IHC. The final results are considered as FISH POSITIVE \"Group 3\". See attached report.         I. CASE FROM [place], [place] ([id number], OBTAINED 11/16/21):  LEFT BREAST MASS, 10:00, 6 CM FROM NIPPLE, ULTRASOUND-GUIDED CORE BIOPSY:  -INVASIVE DUCTAL CARCINOMA, present within angiolymphatic space.     II. CASE FROM [place], [place] ([id number], OBTAINED " 11/16/21):  LEFT BREAST MASS, 12:00, 8 CM FROM NIPPLE, ULTRASOUND-GUIDED CORE BIOPSY:  -INVASIVE DUCTAL CARCINOMA, [facility] grade 3, at least 2 mm in linear extent.  -Extensive angiolymphatic invasion is present.  -Invasive carcinoma is low positive for estrogen receptor (5% nuclei, weak staining) and negative for progesterone receptor; HER2 is equivocal by immunohistochemistry (score 2+); Ki-67 proliferation index is 27%.     III. CASE FROM [place], [place] ([id number], OBTAINED 11/16/21):  LYMPH NODES, LEFT AXILLARY, ULTRASOUND-GUIDED CORE NEEDLE BIOPSY:  -METASTATIC BREAST CARCINOMA to lymph node, 6.7 mm in greatest dimension.  -Tumor is negative for estrogen and progesterone receptors; per report, HER-2/prosper gene is amplified based on the combined review of the dual probe in situ hybridization and reflex HER-2 immunohistochemistry (see outside report for details).    ADDENDUM3  ECG needs to be repeated.  Poor data quality, interpretation may be adversely affected   Probable sinus tachycarda   Confirmed by MD ROSY, RUBEN ([id number]) on 12/21/2021 11:52:02 PM     I spent 60 minutes with the patient more than 50% of which was in counseling and coordination of care.

## 2021-12-16 NOTE — NURSING NOTE
"Oncology Rooming Note    December 16, 2021 10:37 AM   Mary Kay Deal is a 61 year old female who presents for:    Chief Complaint   Patient presents with     Oncology Clinic Visit     Breast cancer      Initial Vitals: /69   Pulse 117   Temp 98  F (36.7  C) (Oral)   Wt 72.2 kg (159 lb 3.2 oz)   LMP 09/08/2004   SpO2 97%   BMI 26.49 kg/m   Estimated body mass index is 26.49 kg/m  as calculated from the following:    Height as of 10/22/19: 1.651 m (5' 5\").    Weight as of this encounter: 72.2 kg (159 lb 3.2 oz). Body surface area is 1.82 meters squared.  Data Unavailable Comment: Data Unavailable   Patient's last menstrual period was 09/08/2004.  Allergies reviewed: Yes  Medications reviewed: Yes    Medications: Medication refills not needed today.  Pharmacy name entered into [email or web address]:    [facility] - [facility] [place], [place] - [address], [address]  [facility] [place], [place] - [address] [facility] [place] [address] PHARMACY SERVICES - [address] [facility].  [id number] - [place] - [address] AT [address]    Clinical concerns: Go over treatment options       Annmarie Shetty LPN            "

## 2022-01-01 ENCOUNTER — PATIENT OUTREACH (OUTPATIENT)
Dept: CARE COORDINATION | Facility: CLINIC | Age: 62
End: 2022-01-01
Payer: MEDICARE

## 2022-01-01 ENCOUNTER — TELEPHONE (OUTPATIENT)
Dept: PULMONOLOGY | Facility: CLINIC | Age: 62
End: 2022-01-01
Payer: MEDICARE

## 2022-01-01 ENCOUNTER — PATIENT OUTREACH (OUTPATIENT)
Dept: ONCOLOGY | Facility: CLINIC | Age: 62
End: 2022-01-01
Payer: MEDICARE

## 2022-01-01 ENCOUNTER — ONCOLOGY VISIT (OUTPATIENT)
Dept: ONCOLOGY | Facility: CLINIC | Age: 62
End: 2022-01-01
Attending: INTERNAL MEDICINE
Payer: MEDICARE

## 2022-01-01 ENCOUNTER — HEALTH MAINTENANCE LETTER (OUTPATIENT)
Age: 62
End: 2022-01-01

## 2022-01-01 VITALS
HEART RATE: 119 BPM | TEMPERATURE: 97.6 F | SYSTOLIC BLOOD PRESSURE: 147 MMHG | DIASTOLIC BLOOD PRESSURE: 90 MMHG | OXYGEN SATURATION: 90 %

## 2022-01-01 DIAGNOSIS — R91.8 PULMONARY NODULES: Primary | ICD-10-CM

## 2022-01-01 DIAGNOSIS — J44.9 COPD (CHRONIC OBSTRUCTIVE PULMONARY DISEASE) (H): Primary | ICD-10-CM

## 2022-01-01 DIAGNOSIS — Z17.1 MALIGNANT NEOPLASM OF OVERLAPPING SITES OF LEFT BREAST IN FEMALE, ESTROGEN RECEPTOR NEGATIVE (H): Primary | ICD-10-CM

## 2022-01-01 DIAGNOSIS — R91.1 LUNG NODULE: ICD-10-CM

## 2022-01-01 DIAGNOSIS — K21.9 GERD (GASTROESOPHAGEAL REFLUX DISEASE): Primary | ICD-10-CM

## 2022-01-01 DIAGNOSIS — J43.2 CENTRILOBULAR EMPHYSEMA (H): Primary | ICD-10-CM

## 2022-01-01 DIAGNOSIS — C50.812 MALIGNANT NEOPLASM OF OVERLAPPING SITES OF LEFT BREAST IN FEMALE, ESTROGEN RECEPTOR NEGATIVE (H): Primary | ICD-10-CM

## 2022-01-01 DIAGNOSIS — J44.1 CHRONIC OBSTRUCTIVE PULMONARY DISEASE WITH ACUTE EXACERBATION (H): Primary | ICD-10-CM

## 2022-01-01 PROCEDURE — G0463 HOSPITAL OUTPT CLINIC VISIT: HCPCS

## 2022-01-01 PROCEDURE — 99215 OFFICE O/P EST HI 40 MIN: CPT | Performed by: INTERNAL MEDICINE

## 2022-01-01 RX ORDER — POTASSIUM CHLORIDE 750 MG/1
10 CAPSULE, EXTENDED RELEASE ORAL
COMMUNITY
Start: 2022-01-01 | End: 2023-02-08

## 2022-01-01 RX ORDER — LEVOFLOXACIN 500 MG/1
500 TABLET, FILM COATED ORAL DAILY
Qty: 10 TABLET | Refills: 0 | Status: SHIPPED | OUTPATIENT
Start: 2022-01-01 | End: 2022-04-10

## 2022-01-01 RX ORDER — FLUCONAZOLE 200 MG/1
200 TABLET ORAL ONCE
Qty: 1 TABLET | Refills: 0 | Status: SHIPPED | OUTPATIENT
Start: 2022-01-01 | End: 2022-01-01

## 2022-01-01 RX ORDER — CLOTRIMAZOLE 10 MG/1
LOZENGE ORAL
COMMUNITY
Start: 2022-01-01

## 2022-01-01 RX ORDER — FAMOTIDINE 40 MG/1
TABLET, FILM COATED ORAL
Qty: 30 TABLET | Refills: 4 | Status: SHIPPED | OUTPATIENT
Start: 2022-01-01

## 2022-01-01 ASSESSMENT — PAIN SCALES - GENERAL: PAINLEVEL: NO PAIN (0)

## 2022-01-05 NOTE — TELEPHONE ENCOUNTER
Spoke with pt regarding rescheduling appt with Dr. Mason on 2/28 as provider is unavailable. Offered similar times on 2/14 and pt is agreeable. Appt and testing rescheduled; details confirmed with pt who requested itinerary be mailed to her home; mailing address verified.

## 2022-03-03 NOTE — TELEPHONE ENCOUNTER
LVM with care facility requesting a call back to obtain CT images recently completed over there. Provided direct call back number.

## 2022-03-03 NOTE — PROGRESS NOTES
Spoke to patient's daughter Marlene Clifford asking if the Oncologist in [place] contacted Dr Ferguson regarding a CT done 3/2/2022. Writer did not find documentation from Dr Ferguson that occurred. [facility] Pulmonary department contacted to obtain CT images for review. Marlene will talk with her Mother if she wants an opinion from Dr Ferguson and arrange a video visit with her living in [place] and return call to writer to arrange a video visit.  Answered all patient's daughter's questions and verbalized understanding. Kala Garcia RN, BSN.   Daughter spoke to her mother and does wish to see Dr Ferguson again to discuss current treatment and updated CT done this week. Will continue treatment at her primary Oncologist in [place] is recommending and will schedule an in person visit with Dr Ferguson 3/24/2022 to review CT and current treatment as the family and patient is requesting. Message sent to scheduling to arrange the appointment and she will have labs drawn before treatment on 3/22/2022 so will not need to repeat lab work on 3/24/2022.  Answered all patient's daughters questions and verbalized understanding. Kala Garcia RN, BSN.

## 2022-03-03 NOTE — TELEPHONE ENCOUNTER
Daughter (Marlene) called in stating pt had CT scan with contrast on March 2, 2022 at [facility] where she is being treated for breast cancer. CT showed some lung nodules that they were concerned with. Pt would like Dr. Mason to review scan and see if any treatment is needed. RN advised would request images be sent to be reviewed and would be in contact with them once reviewed. Daughter/patient ok with this plan. Daughter said there was a release on file with cancer center.     [facility]: [place] [address] , [place] [id number] [phone]    Noreen Monique RN BSN

## 2022-03-03 NOTE — TELEPHONE ENCOUNTER
Spoke with [facility] and able to have images pushed into our system. Will update the clinical team after film resolution.

## 2022-03-20 NOTE — PROGRESS NOTES
MEDICAL ONCOLOGY CONSULT     Miah Elder MD  [facility]  [address] [place] [address]     RE:  Mary Kay Deal  MRN:  [id number]  :  1960        Dear Dr. 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  Clinical stage T2 N1 MX.     Mary Kay was in her usual state of health with severe COPD, oxygen dependent on 4 liters of oxygen.  She has been oxygen dependent for at least 5 years.  Additionally, in  she underwent SBRT treatment for presumptive left upper lobe non-small cell lung cancer, clinical T1c N0 M0.  She did stop smoking approximately 10 years ago.  She saw Dr. Albert for the problem for the left breast mass, which she noted in late 10/2021.  On 2021, she underwent a mammogram, which showed diffuse density over the anterior aspect of the central left breast.  Ultrasound showed a 22 mm ill-defined mass at 12 o'clock, 8 cm from the nipple, and an 8 mm mass at the 10 o'clock position, 6 cm from the nipple and hypoechogenicity retroareolarly.  There was a 1.8 cm left axillary lymph node.     Biopsy was performed on the 10 o'clock mass and an open coil clip was placed that showed high grade mammary carcinoma with diffuse lymphovascular invasion similar in morphology with a 12 o'clock mass.  A biopsy was performed at the 12 o'clock mass and a butterfly clip was placed.  It showed invasive ductal carcinoma, grade 3, ER low positive at 5%, NM negative and HER2 equivocal by IHC.  A biopsy was performed of the left axillary lymph node and an open coil clip was placed.  The biopsy showed metastatic high grade mammary carcinoma similar in morphology to the 12 o'clock mass, ER negative, NM negative and HER2  equivocal by IHC and positive by FISH.     She now comes to clinic with her , Brad and daughter, Marlene, for recommendations.     PAST MEDICAL HISTORY:  She has no history of prior breast biopsies but does have a history of a left lumpectomy performed when she was a teenager.  The results of the lumpectomy are reported as benign.  She does have a history of SBRT radiation to the left lung in 2013 for a presumptive NSCLC.     FAMILY HISTORY:  Positive for breast cancer in a maternal first cousin and paternal first cousin both in their 60s or 70s.  Family history is negative for ovarian cancer, pancreatic cancer, melanoma.  She does have a paternal uncle with a history of gastric cancer.  No history of colon cancer in the family.  Her mother had a nonmelanoma skin cancer on her nose.  Also, notably, the patient's brother had enucleation for an ocular tumor which occurred in childhood, but he currently has had no evidence of further cancer.     PAST MEDICAL HISTORY:  She is on 4 liters of oxygen at rest.  She is unable to walk a block.  She has no history of heart attack, cerebrovascular accident or diabetes.  She does have severe tobacco-related COPD.  She has no history of seizures, arthritis, peptic ulcer disease but does have a history of osteoporosis and bone fractures.      TREATMENT SUMMARY  A.  Poor performance status with oxygen-dependent COPD, on 4 liters of oxygen at baseline, who presented with a clinical T2 multifocal  N1 M0, invasive ductal carcinoma of the left breast, which is low ER positive at 5%, MD negative and HER2 amplified.   B.  This addendum is issued to report the results of Her2 FISH performed and interpreted at [facility].   Results are as follows:  --- Her2 by FISH:  POSITIVE with reflex testing.   C.  Pertuzumab and trastuzumab begun  (C1 12/23/21) and continued through the end of March, 2022.  Continuation recommended provided EF greater than 40%. EF 2-16-22 was 45-50%.   Echo  12-8-21 was 50-55%.      ALLERGIES:  No allergy to seafood, iodine, or contrast dye.  She does not take aspirin.     PAST MENSTRUAL HISTORY:  Menarche was age 13 or 14.  She has been pregnant 3 times with 3 live births.  Age at first pregnancy 21.  Age at last pregnancy, 26.  Her last period was in her late 30s and occurred naturally.  Her uterus and ovaries are in place.     No history of hormone replacement therapy.     She has approximately a 70-pack-year history of smoking, starting at age 14 and continuing for 34 years, 1-2 packs per day.     She has a history of alcoholism but has been sober for 36 years and I congratulated her on that.     No history of diabetes.    INTERVAL HISTORY: Mary Kay is here today with her  and daughter. She continues to need 4L of oxygen at rest and is not able to take 1-2 steps without desaturating. She overall feels tired and weak. I discussed goals of care with Mary Kay, her , Brad, and her daughter, Marlene, today.  She came back to our clinic for an opinion regarding the plan moving forward.  She has received approximately 3 months of trastuzumab and pertuzumab without chemotherapy as palliative therapy for her left-sided HER2 positive breast cancer.  She has an ECOG 3 performance status and is on at least 4 liters of oxygen.  She is unable to lay flat and is not a surgical candidate.  I did have a discussion with Dr. Miah Elder regarding possible treatment options.  My recommendation was to continue the pertuzumab and trastuzumab as long as she wants further treatment,  is tolerating it with no deterioration in performance status.  PS is barely 2 today.  I would follow her ejection fraction, and as long as it is greater than about 40%, I think it would be reasonable to continue the pertuzumab and trastuzumab in the palliative setting.      She has no pain and has mild discomfort in the left breast.  She has severe fatigue but denies depression or anxiety.    She  recently saw her primary Oncologist and had CT chest.  Dr. Elder does not think there is progression of the breast masses and lymph nodes and I agree with him.  A radiation oncologist was considering radiation to her breast., but I do not recommend radiation at this time.     REVIEW OF SYSTEMS:  A 10-point review of systems is remarkable for a new nodule that has appeared on the left upper abdomen, which may be a metastatic nodule.     PHYSICAL EXAMINATION:    VITAL SIGNS:  BP (!) 147/90 (BP Location: Left arm, Patient Position: Sitting, Cuff Size: Adult Regular)   Pulse 119   Temp 97.6  F (36.4  C) (Oral)   LMP 09/08/2004   SpO2 90%   On 5 L of O2.   GENERAL:  Mary Kay appeared unwell.  She was wearing nasal cannula oxygen.  She was short of breath and was uncomfortable lying back and was sitting in a 45-degree angle for the breast exam. She has no alopecia but thinning hair.  HEENT:  No lesions in the oropharynx.  LYMPH:  There is no palpable cervical, supraclavicular, subclavicular or axillary lymphadenopathy except for a palpable left axillary lymph node.  BREASTS:  Examination of the right breast reveals no masses.  Examination of the left breast reveals no evidence of skin breakdown, but there were at least 2 or 3 palpable nodules in the left breast from the 5 o'clock to the 9-o'clock position, the largest of which measured about 2 cm, and there was no skin breakdown.  LUNGS:  There were diffuse wheezes.  HEART:  There is a regular rate and rhythm.  S1, S2.  ABDOMEN:  Soft and nontender and remarkable for a nodule in the right upper quadrant.  This is a dermal nodule, which is suspicious.  ABDOMEN:  Soft, nontender.  EXTREMITIES:  Without edema.  PSYCH:  Mood and affect were normal.     LABORATORY:  No labs today     IMAGING:  CT Chest 3-2-22  IMPRESSION:   1.  Unchanged small enhancing masses within the central left breast,   which correlate with known breast cancer.   2.  Mildly decreased size of two left  axillary lymph node metastases.   3.  New 1.4 cm left upper lobe nodular opacity. Given a new nodule of   this size, as well as its appearance, this is favored to be   infectious/inflammatory in etiology. Although, it is indeterminate.   Additional new 5 mm indeterminate pulmonary nodule in the left lower   lobe. Recommend close surveillance with follow-up chest CTs in 6-12   weeks.   4.  Severe emphysematous changes.   5.  Additional incidental findings, as detailed in the body of the   report.    I reviewed the CT chest with Dr. Xochilt Luna and she does not think that the left breast masses or lymph nodes have progressed since December, 2021.     ASSESSMENT AND PLAN:    1.  Mary Kay Deal is a 61-year-old woman with poor performance status with oxygen-dependent COPD, on 4 liters of oxygen at baseline, who presented with a clinical T2 multifocal  N1 M0, invasive ductal carcinoma of the left breast, which is low ER positive at 5%, MA negative and HER2 amplified.  Notably, Mary Kay's staging PET/CT scan shows no evidence of distant metastatic disease. Mary Kay was recently here for discussion of treatment recommendations. She began with palliative intent, discussed with Mary Kay nd her family, on the NeoSphere arm C approach of Dr. Min and colleagues and begin with dual HER2 antibody treatment with pertuzumab and trastuzumab without a taxane.  [Pako SIMMONS, Jameel T, st al.. Lancet Oncol. 2012]. She is tolerating that well. She had a recent echocardiogram (EF 45-50%).   I recommend continuing with the regimen for as long as she tolerates it and EF is greater than 40%, but hospice may be a preferable approach. I would not add radiation at this time.  Would recheck EF in May, 2022.    2. I do not recommend radiation at this time.  She has minimal discomfort from the left breast tumor.  No skin breakdown.  I discussed Mary Kay with Dr. Mónica Willis in Radiation Oncology, [facility] and she does not recommend radiation at this  time.  Radiation could be considered if there is a symptom that could be palliated, but radiation is not recommended for tumor control alone because of an unfavorable risk/benefit ratio in a patient considering hospice, but our discussion.  I recommend to rather continue on dual antibody therapy.    3.  She likely has metastatic disease with a new abdominal skin nodule.  If it would help in decision making by the patient and her family, the nodule could be biopsied.   4.  Brain MRI. She does have cerebellar neurologic findings today. If she wishes to continue with treatment, a brain MRI could be considered. If she has brain metastases, steroids and palliative radiation could be considered.   5.  Severe COPD.  She is on albuterol, arformoterol, budesonide as well as montelukast and Spiriva.  She also is on azithromycin.  For hypertension, she is on amlodipine.  6.  Depression.  She is on sertraline, recently increased due to worsening depression and anxiety.   7.  Discussion of goals of care. I discussed with Mary Kay, her daughter, Marlene, and her , Brad, that I do not recommend radiation to her left breast at this time. There is no skin breakdown and I think that the radiation would not be well tolerated in terms of fatigue and the potential for further lung damage. I think there could be issues even if efforts were made to help protect the lungs.  I do not think that Mary Kay would be able to do breath holding or other maneuvers to help protect her lung tissue.  I do think that the continuation of palliative HER2-directed antibodies is reasonable and Mary Kay has been tolerating this treatment reasonably well.   8.  Discussion hospice.  I discussed that proceeding with hospice soon would be reasonable.  I discussed that her survival is limited and hospice may provide comfort care her home and give her more time to spend with her family instead on medical visits.  All questions were answered.   9.  Follow up with  Dr. Elder.  We would be happy to see Mary Kay on an as requested basis.     Thank you, Dr. Elder, for referring Mary Kay Deal to our clinic and allowing us to participate in her care.      Thank you for allowing us to participate in this patient's care.  The patient was seen and evaluated by me.  I discussed the patient with the fellow and agree with the findings and plan in the note, which was edited by me.    Sincerely,     Jose Ferguson MD  Professor  [facility]  [phone].      I spent 60 minutes with the patient more than 50% of which was in counseling and coordination of care.

## 2022-03-22 NOTE — PROGRESS NOTES
Returned call to patient's daughter Marlene Clifford ([phone]) with the appointment with Dr Ferguson 3/24/2022 having car trouble. Writer moved the appointment to 3/29/2022 at 10:00 time  Held and message sent to scheduling to arrange. Patient having lab work 3/25/2022 in [place] so will not repeat labs prior to appointment with Dr Ferguson. Offered information to contact Hernesto Griffith to assist with arranging to [facility] up the day before.  Answered all patient's daughter's questions and verbalized understanding. Kala Garcia RN, BSN.

## 2022-03-24 NOTE — TELEPHONE ENCOUNTER
Daughter Marlene called and asked status of CT image review. RN advised that provider will review images as soon as he is able and that one of the clinic staff will be in contact with her. Daughter also wanted to give notification that patient intends to purchase [employer] G5 concentrator OOP, as insurance will not pay for the continuous flow machine that she would like. Patient will have form faxed to clinic to be filled out with prescription and returned to [employer]. RN will pass CT review request to provider and watch for fax from [employer].

## 2022-03-28 NOTE — TELEPHONE ENCOUNTER
Signed rx for [employer], fax cover sheet and demographic sheet faxed to Davey at [employer] ([phone]).

## 2022-03-29 NOTE — NURSING NOTE
"Oncology Rooming Note    March 29, 2022 10:23 AM   Mary Kay Deal is a 61 year old female who presents for:    Chief Complaint   Patient presents with     Oncology Clinic Visit     Breast Cancer     Initial Vitals: BP (!) 147/90 (BP Location: Left arm, Patient Position: Sitting, Cuff Size: Adult Regular)   Pulse 119   Temp 97.6  F (36.4  C) (Oral)   LMP 09/08/2004   SpO2 90%  Estimated body mass index is 26.49 kg/m  as calculated from the following:    Height as of 10/22/19: 1.651 m (5' 5\").    Weight as of 12/16/21: 72.2 kg (159 lb 3.2 oz). There is no height or weight on file to calculate BSA.  No Pain (0) Comment: Data Unavailable   Patient's last menstrual period was 09/08/2004.  Allergies reviewed: Yes  Medications reviewed: Yes    Medications: Medication refills not needed today.  Pharmacy name entered into [facility]:    [facility] [place], [place] - [address], [address]  [facility] [place], [place] - [address] [facility] [address]  [facility] - [address]  [id number] - [place], [place] - [address]    Clinical concerns: none.      Gonzalez Olsen CMA            "

## 2022-03-29 NOTE — LETTER
3/29/2022         RE: Mary Kay Deal  [address]  [place] [address]        Dear Colleague,    Thank you for referring your patient, Mary Kay Deal, to the [facility]. Please see a copy of my visit note below.      MEDICAL ONCOLOGY CONSULT     Miah Elder MD  [facility] (formerly [facility]3]  [address] [place] [address]     RE:  Mary Kay Deal  MRN:  [id number]  :  1960        Dear Dr. 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  Clinical stage T2 N1 MX.     Mary Kay was in her usual state of health with severe COPD, oxygen dependent on 4 liters of oxygen.  She has been oxygen dependent for at least 5 years.  Additionally, in  she underwent SBRT treatment for presumptive left upper lobe non-small cell lung cancer, clinical T1c N0 M0.  She did stop smoking approximately 10 years ago.  She saw Dr. Albert for the problem for the left breast mass, which she noted in late 10/2021.  On 2021, she underwent a mammogram, which showed diffuse density over the anterior aspect of the central left breast.  Ultrasound showed a 22 mm ill-defined mass at 12 o'clock, 8 cm from the nipple, and an 8 mm mass at the 10 o'clock position, 6 cm from the nipple and hypoechogenicity retroareolarly.  There was a 1.8 cm left axillary lymph node.     Biopsy was performed on the 10 o'clock mass and an open coil clip was placed that showed high grade mammary carcinoma with diffuse lymphovascular invasion similar in morphology with a 12 o'clock mass.  A biopsy was performed at the 12 o'clock mass and a butterfly clip was placed.  It showed invasive ductal carcinoma, grade 3, ER low positive at 5%, TX negative and HER2  equivocal by IHC.  A biopsy was performed of the left axillary lymph node and an open coil clip was placed.  The biopsy showed metastatic high grade mammary carcinoma similar in morphology to the 12 o'clock mass, ER negative, NY negative and HER2 equivocal by IHC and positive by FISH.     She now comes to clinic with her , Brad and daughter, Marlene, for recommendations.     PAST MEDICAL HISTORY:  She has no history of prior breast biopsies but does have a history of a left lumpectomy performed when she was a teenager.  The results of the lumpectomy are reported as benign.  She does have a history of SBRT radiation to the left lung in 2013 for a presumptive NSCLC.     FAMILY HISTORY:  Positive for breast cancer in a maternal first cousin and paternal first cousin both in their 60s or 70s.  Family history is negative for ovarian cancer, pancreatic cancer, melanoma.  She does have a paternal uncle with a history of gastric cancer.  No history of colon cancer in the family.  Her mother had a nonmelanoma skin cancer on her nose.  Also, notably, the patient's brother had enucleation for an ocular tumor which occurred in childhood, but he currently has had no evidence of further cancer.     PAST MEDICAL HISTORY:  She is on 4 liters of oxygen at rest.  She is unable to walk a block.  She has no history of heart attack, cerebrovascular accident or diabetes.  She does have severe tobacco-related COPD.  She has no history of seizures, arthritis, peptic ulcer disease but does have a history of osteoporosis and bone fractures.      TREATMENT SUMMARY  A.  Poor performance status with oxygen-dependent COPD, on 4 liters of oxygen at baseline, who presented with a clinical T2 multifocal  N1 M0, invasive ductal carcinoma of the left breast, which is low ER positive at 5%, NY negative and HER2 amplified.   B.  This addendum is issued to report the results of Her2 FISH performed and interpreted at [facility].   Results are as  follows:  --- Her2 by FISH:  POSITIVE with reflex testing.   C.  Pertuzumab and trastuzumab begun  (C1 12/23/21) and continued through the end of March, 2022.  Continuation recommended provided EF greater than 40%. EF 2-16-22 was 45-50%.   Echo 12-8-21 was 50-55%.      ALLERGIES:  No allergy to seafood, iodine, or contrast dye.  She does not take aspirin.     PAST MENSTRUAL HISTORY:  Menarche was age 13 or 14.  She has been pregnant 3 times with 3 live births.  Age at first pregnancy 21.  Age at last pregnancy, 26.  Her last period was in her late 30s and occurred naturally.  Her uterus and ovaries are in place.     No history of hormone replacement therapy.     She has approximately a 70-pack-year history of smoking, starting at age 14 and continuing for 34 years, 1-2 packs per day.     She has a history of alcoholism but has been sober for 36 years and I congratulated her on that.     No history of diabetes.    INTERVAL HISTORY: Mary Kay is here today with her  and daughter. She continues to need 4L of oxygen at rest and is not able to take 1-2 steps without desaturating. She overall feels tired and weak. I discussed goals of care with Mary Kay, her , Brad, and her daughter, Marlene, today.  She came back to our clinic for an opinion regarding the plan moving forward.  She has received approximately 3 months of trastuzumab and pertuzumab without chemotherapy as palliative therapy for her left-sided HER2 positive breast cancer.  She has an ECOG 3 performance status and is on at least 4 liters of oxygen.  She is unable to lay flat and is not a surgical candidate.  I did have a discussion with Dr. Miah Elder regarding possible treatment options.  My recommendation was to continue the pertuzumab and trastuzumab as long as she wants further treatment,  is tolerating it with no deterioration in performance status.  PS is barely 2 today.  I would follow her ejection fraction, and as long as it is greater than  about 40%, I think it would be reasonable to continue the pertuzumab and trastuzumab in the palliative setting.      She has no pain and has mild discomfort in the left breast.  She has severe fatigue but denies depression or anxiety.    She recently saw her primary Oncologist and had CT chest.  Dr. Elder does not think there is progression of the breast masses and lymph nodes and I agree with him.  A radiation oncologist was considering radiation to her breast., but I do not recommend radiation at this time.     REVIEW OF SYSTEMS:  A 10-point review of systems is remarkable for a new nodule that has appeared on the left upper abdomen, which may be a metastatic nodule.     PHYSICAL EXAMINATION:    VITAL SIGNS:  BP (!) 147/90 (BP Location: Left arm, Patient Position: Sitting, Cuff Size: Adult Regular)   Pulse 119   Temp 97.6  F (36.4  C) (Oral)   LMP 09/08/2004   SpO2 90%   On 5 L of O2.   GENERAL:  Mary Kay appeared unwell.  She was wearing nasal cannula oxygen.  She was short of breath and was uncomfortable lying back and was sitting in a 45-degree angle for the breast exam. She has no alopecia but thinning hair.  HEENT:  No lesions in the oropharynx.  LYMPH:  There is no palpable cervical, supraclavicular, subclavicular or axillary lymphadenopathy except for a palpable left axillary lymph node.  BREASTS:  Examination of the right breast reveals no masses.  Examination of the left breast reveals no evidence of skin breakdown, but there were at least 2 or 3 palpable nodules in the left breast from the 5 o'clock to the 9-o'clock position, the largest of which measured about 2 cm, and there was no skin breakdown.  LUNGS:  There were diffuse wheezes.  HEART:  There is a regular rate and rhythm.  S1, S2.  ABDOMEN:  Soft and nontender and remarkable for a nodule in the right upper quadrant.  This is a dermal nodule, which is suspicious.  ABDOMEN:  Soft, nontender.  EXTREMITIES:  Without edema.  PSYCH:  Mood and affect  were normal.     LABORATORY:  No labs today     IMAGING:  CT Chest 3-2-22  IMPRESSION:   1.  Unchanged small enhancing masses within the central left breast,   which correlate with known breast cancer.   2.  Mildly decreased size of two left axillary lymph node metastases.   3.  New 1.4 cm left upper lobe nodular opacity. Given a new nodule of   this size, as well as its appearance, this is favored to be   infectious/inflammatory in etiology. Although, it is indeterminate.   Additional new 5 mm indeterminate pulmonary nodule in the left lower   lobe. Recommend close surveillance with follow-up chest CTs in 6-12   weeks.   4.  Severe emphysematous changes.   5.  Additional incidental findings, as detailed in the body of the   report.    I reviewed the CT chest with Dr. Xochilt Luna and she does not think that the left breast masses or lymph nodes have progressed since December, 2021.     ASSESSMENT AND PLAN:    1.  Mary Kay Deal is a 61-year-old woman with poor performance status with oxygen-dependent COPD, on 4 liters of oxygen at baseline, who presented with a clinical T2 multifocal  N1 M0, invasive ductal carcinoma of the left breast, which is low ER positive at 5%, TN negative and HER2 amplified.  Notably, Mary Kay's staging PET/CT scan shows no evidence of distant metastatic disease. Mary Kay was recently here for discussion of treatment recommendations. She began with palliative intent, discussed with Mary Kay nd her family, on the NeoSphere arm C approach of Dr. Min and colleagues and begin with dual HER2 antibody treatment with pertuzumab and trastuzumab without a taxane.  [Pako SIMMONS, Jameel T, st al.. Lancet Oncol. 2012]. She is tolerating that well. She had a recent echocardiogram (EF 45-50%).   I recommend continuing with the regimen for as long as she tolerates it and EF is greater than 40%, but hospice may be a preferable approach. I would not add radiation at this time.  Would recheck EF in May, 2022.     2. I do not recommend radiation at this time.  She has minimal discomfort from the left breast tumor.  No skin breakdown.  I discussed Mary Kay with Dr. Mónica Willis in Radiation Oncology, [facility] and she does not recommend radiation at this time.  Radiation could be considered if there is a symptom that could be palliated, but radiation is not recommended for tumor control alone because of an unfavorable risk/benefit ratio in a patient considering hospice, but our discussion.  I recommend to rather continue on dual antibody therapy.    3.  She likely has metastatic disease with a new abdominal skin nodule.  If it would help in decision making by the patient and her family, the nodule could be biopsied.   4.  Brain MRI. She does have cerebellar neurologic findings today. If she wishes to continue with treatment, a brain MRI could be considered. If she has brain metastases, steroids and palliative radiation could be considered.   5.  Severe COPD.  She is on albuterol, arformoterol, budesonide as well as montelukast and Spiriva.  She also is on azithromycin.  For hypertension, she is on amlodipine.  6.  Depression.  She is on sertraline, recently increased due to worsening depression and anxiety.   7.  Discussion of goals of care. I discussed with Mary Kay, her daughter, Marlene, and her , Brad, that I do not recommend radiation to her left breast at this time. There is no skin breakdown and I think that the radiation would not be well tolerated in terms of fatigue and the potential for further lung damage. I think there could be issues even if efforts were made to help protect the lungs.  I do not think that Mary Kay would be able to do breath holding or other maneuvers to help protect her lung tissue.  I do think that the continuation of palliative HER2-directed antibodies is reasonable and Mary Kay has been tolerating this treatment reasonably well.   8.  Discussion hospice.  I discussed that proceeding with hospice  soon would be reasonable.  I discussed that her survival is limited and hospice may provide comfort care her home and give her more time to spend with her family instead on medical visits.  All questions were answered.   9.  Follow up with Dr. Elder.  We would be happy to see Mary Kay on an as requested basis.     Thank you, Dr. Elder, for referring Mary Kay Deal to our clinic and allowing us to participate in her care.      Thank you for allowing us to participate in this patient's care.  The patient was seen and evaluated by me.  I discussed the patient with the fellow and agree with the findings and plan in the note, which was edited by me.    Sincerely,     Jose Ferguson MD  Professor  [facility]  [phone].      I spent 60 minutes with the patient more than 50% of which was in counseling and coordination of care.                      Again, thank you for allowing me to participate in the care of your patient.      Sincerely,    Jose Ferguson MD

## 2022-03-31 NOTE — TELEPHONE ENCOUNTER
Health Call Center    Phone Message    May a detailed message be left on voicemail: yes     Reason for Call: Other:          Mary Kay is calling in to report that her nebulizer has broken.  [facility] ([phone]) states they need a new prescription in order to replace it.      Please submit new prescription for new Nebulizer.             Action Taken: Message routed to:  [facility] ([facility] Pulm    Travel Screening: Not Applicable

## 2022-03-31 NOTE — PROGRESS NOTES
Called pt: Sx slowly worsening and coughing up thick white phlegm. Nebulizer broke.    Chest CT from 3/2/2022 with new left UL opacity c/w 8/2021. Most consistent with inflammation/infection.    Plan:   - Sent script for nebulizer.   - Treat with Levaquin 500mg/day for 10 days.   - Diflucan 200mg x1 (for thrush).   - Postpone appt to four weeks from now and repeat Chest CT (non-contrast) to f/u left UL nodule.   - Sputum cultures - Bacterial/fungal and AFB.    Keith Mason MD.  Pulmonary and Critical Care.  03/31/2022  3:55 PM

## 2022-03-31 NOTE — PROGRESS NOTES
Patient's daughter Marlene called asking about her Mother seeing Radiation Oncology. Dr Ferguson recommended not to do radiation at this time. Discussed hospice options as they were not happy with hospice in the past and Marlene will discuss with her parents to use [facility] and writer will assist with setting up an appointment to discuss hospice.  Answered all patient's daughter's questions and verbalized understanding. Kala Garcia RN, BSN.

## 2022-03-31 NOTE — TELEPHONE ENCOUNTER
Fax cover sheet, demographic sheet and signed nebulizer order faxed to Ghazal at [facility] ([phone]).

## 2022-04-01 NOTE — TELEPHONE ENCOUNTER
Spoke with patient and advised that she start her medication and is to have followup CT two weeks after finishing, per Dr. Mason. Patient would like a sputum induction for the ordered sputum sample, as she can never give an adequate sample. RN will contact her local hospital to see if it can be done there, as she live far away.

## 2022-04-04 NOTE — TELEPHONE ENCOUNTER
Received communication from Dr. Mason that pt's appts should remain on the 4/18 and have a CT added prior to the appt. Test scheduled and appt details confirmed with pt; Pt also need to complete sputum samples, but have difficulty producing, so needs sputum induction test. Fax cover sheet, demographic sheet, sputum lab orders and sputum induction test order faxed to [facility] ([phone]) to be completed and results faxed back (number provided) by 4/15.

## 2023-02-21 NOTE — PROGRESS NOTES
RADIATION ONCOLOGY FOLLOW-UP  20  NAME: Mary Kay Deal  MRN: [id number]  : 1960    DISEASE TREATED: Presumed NSCLC of the left upper lobe, [id number]    INTERVAL SINCE COMPLETION OF RADIOTHERAPY: ~7 years (Completed 2013)    TYPE OF RADIOTHERAPY GIVEN: SBRT 5000cGy in 5 fractions, 6MV, 80%IDL    SUBJECTIVE:   Mrs. Deal is a 57 year old woman with history of recurrent fungal infection and non-biopsy proven stage IA lung cancer of the RUBEN, s/p SBRT. She returns for routine follow-up.    On exam today, Ms. Deal is overall doing well.  She continues on oxygen between 2 -4L.  She occasionally has to take prednisone for shortness of breath, but estimates it is only every couple of months.  She has fatigue.    She is in a scooter.  They have been very careful because of COVID. She otherwise denies fevers, chills, nausea, vomiting, diarrhea. A complete review of systems was otherwise unremarkable.  She does not feel as though the radiation has affected her lung function and has no side effects from the radiation.  She quit smoking with her lung cancer diagnosis.  OBJECTIVE:  LMP 2004   Gen: No acute distress. Alert, oriented.   Neck: No palpable LNs in the neck or supraclavicular areas.   CV: Regular rate and rhythm. No murmur.   Lungs: No wheezes, but decreased breath sounds throughout.    IMAGING:   CT scan 2020:                                                                   IMPRESSION:  1. Diffuse emphysematous changes with no evidence of recurrent  disease.  2. Unchanged moderate hiatal hernia.     I have personally reviewed the examination and initial interpretation  and I agree with the findings.     SAMSON HERRERA MD         IMPRESSION: Presumed NSCLC of left upper lobe, cT1a N0M0 with radiographic complete response to SBRT.     RECOMMENDATIONS: Follow up in 1 year with CT chest. Continue close follow up with pulmonology for her lung function and COPD.    Nicole Burton NP  Department of  Radiation Oncology  [facility]   [phone]

## 2025-04-09 NOTE — PROGRESS NOTES
Per patient's request, completed and faxed [facility] (Ph. [phone], F. [phone]) request for lodging dates 03/28/2022-03/29/2022. Hope Hennessey will contact patient for confirmation of reservation.  will continue to provide support as needed.    STACY Watson,[facility]  Hematology/Oncology Social Worker  Phone:[phone] Pager: [phone]       09-Apr-2025 12:05